# Patient Record
Sex: FEMALE | Race: BLACK OR AFRICAN AMERICAN | NOT HISPANIC OR LATINO | Employment: FULL TIME | URBAN - METROPOLITAN AREA
[De-identification: names, ages, dates, MRNs, and addresses within clinical notes are randomized per-mention and may not be internally consistent; named-entity substitution may affect disease eponyms.]

---

## 2017-01-05 ENCOUNTER — OFFICE VISIT (OUTPATIENT)
Dept: FAMILY MEDICINE CLINIC | Facility: CLINIC | Age: 37
End: 2017-01-05

## 2017-01-05 VITALS
TEMPERATURE: 98.5 F | SYSTOLIC BLOOD PRESSURE: 110 MMHG | RESPIRATION RATE: 20 BRPM | WEIGHT: 197 LBS | DIASTOLIC BLOOD PRESSURE: 78 MMHG | BODY MASS INDEX: 31.8 KG/M2 | HEART RATE: 64 BPM

## 2017-01-05 DIAGNOSIS — F41.9 ANXIETY: ICD-10-CM

## 2017-01-05 DIAGNOSIS — M16.12 OSTEOARTHRITIS OF LEFT HIP, UNSPECIFIED OSTEOARTHRITIS TYPE: Primary | ICD-10-CM

## 2017-01-05 DIAGNOSIS — E55.9 VITAMIN D DEFICIENCY: ICD-10-CM

## 2017-01-05 PROCEDURE — 99213 OFFICE O/P EST LOW 20 MIN: CPT | Performed by: NURSE PRACTITIONER

## 2017-01-05 RX ORDER — ERGOCALCIFEROL 1.25 MG/1
50000 CAPSULE ORAL
Qty: 4 CAPSULE | Refills: 5 | Status: SHIPPED | OUTPATIENT
Start: 2017-01-05 | End: 2017-05-30 | Stop reason: SDUPTHER

## 2017-01-05 RX ORDER — DULOXETIN HYDROCHLORIDE 60 MG/1
60 CAPSULE, DELAYED RELEASE ORAL DAILY
Qty: 30 CAPSULE | Refills: 1 | Status: SHIPPED | OUTPATIENT
Start: 2017-01-05 | End: 2017-03-01 | Stop reason: SDUPTHER

## 2017-01-05 NOTE — MR AVS SNAPSHOT
Bladimir Bah   1/5/2017 10:00 AM   Office Visit    Dept Phone:  [phone]   Encounter #:  [id number]    Provider:  TEJA Dias   Department:  [facility] FAMILY MEDICINE                Your Full Care Plan              Today's Medication Changes          These changes are accurate as of: 1/5/17 10:32 AM.  If you have any questions, ask your nurse or doctor.               New Medication(s)Ordered:     DULoxetine 60 MG capsule   Commonly known as:  CYMBALTA   Take 1 capsule by mouth Daily.   Started by:  TEJA Dias         Medication(s)that have changed:     vitamin D 91110 UNITS capsule capsule   Commonly known as:  ERGOCALCIFEROL   Take 1 capsule by mouth Every 7 (Seven) Days.   What changed:  See the new instructions.   Changed by:  TEJA Dias            Where to Get Your Medications      These medications were sent to [facility]/pharmacy #[id number] - [address] AT [address] [phone] [facility] [phone] [place] [address]    Hours:  24-hours Phone:  [phone]     DULoxetine 60 MG capsule    vitamin D 10046 UNITS capsule capsule                  Your Updated Medication List          This list is accurate as of: 1/5/17 10:32 AM.  Always use your most recent med list.                Cetirizine HCl 10 MG capsule   Commonly known as:  ZYRTEC ALLERGY   Take 10 mg by mouth daily.       dicyclomine 20 MG tablet   Commonly known as:  BENTYL   Take 1 tablet by mouth 4 (four) times a day.       DULoxetine 60 MG capsule   Commonly known as:  CYMBALTA   Take 1 capsule by mouth Daily.       fluticasone 50 MCG/ACT nasal spray   Commonly known as:  FLONASE   2 sprays into each nostril daily.       hydrochlorothiazide 25 MG tablet   Commonly known as:  HYDRODIURIL   TAKE 1 TABLET EVERY DAY       hydrOXYzine 50 MG tablet   Commonly known as:  ATARAX   Take 1 tablet by mouth 3 (three) times a day.       levocetirizine 5 MG tablet   Commonly known as:  XYZAL   Take 1 tablet by mouth daily.       omeprazole 40 MG capsule   Commonly known as:  priLOSEC   Take 1 capsule by mouth daily.       ondansetron ODT 8 MG disintegrating tablet   Commonly known as:  ZOFRAN-ODT       oxyCODONE 10 MG tablet   Commonly known as:  ROXICODONE       raNITIdine 150 MG tablet   Commonly known as:  ZANTAC   Take 1 tablet by mouth every night.       * sertraline 50 MG tablet   Commonly known as:  ZOLOFT   Take 1 tablet by mouth daily.       * sertraline 50 MG tablet   Commonly known as:  ZOLOFT   TAKE 1/2 TABLET DAILY FOR 7 DAYS. THEN INCREASE TO ONE TABLET DAILY.       VITAMIN D (CHOLECALCIFEROL) PO       vitamin D 42157 UNITS capsule capsule   Commonly known as:  ERGOCALCIFEROL   Take 1 capsule by mouth Every 7 (Seven) Days.       * Notice:  This list has 2 medication(s) that are the same as other medications prescribed for you. Read the directions carefully, and ask your doctor or other care provider to review them with you.            We Performed the Following     Ambulatory Referral to Orthopedic Surgery       You Were Diagnosed With        Codes Comments    Osteoarthritis of left hip, unspecified osteoarthritis type    -  Primary ICD-10-CM: M16.12  ICD-9-CM: 715.95     Vitamin D deficiency     ICD-10-CM: E55.9  ICD-9-CM: 268.9     Anxiety     ICD-10-CM: F41.9  ICD-9-CM: 300.00       Instructions     None    Patient Instructions History      Upcoming Appointments     Visit Type Date Time Department    OFFICE VISIT 1/5/2017 10:00 AM [facility]      [employer] Signup     [facility] [employer] allows you to send messages to your doctor, view your test results, renew your prescriptions, schedule appointments, and more. To sign up, go to [employer] and click on the Sign Up Now link in the New User? box. Enter your [employer] Activation Code exactly as it appears below along with the last four digits of your Social  Security Number and your Date of Birth () to complete the sign-up process. If you do not sign up before the expiration date, you must request a new code.    [employer] Activation Code: [email or web address]  Expires: 2017 10:32 AM    If you have questions, you can email [email or web address] or call [phone] to talk to our [employer] staff. Remember, [employer] is NOT to be used for urgent needs. For medical emergencies, dial 911.               Other Info from Your Visit           Allergies     No Known Drug Allergy        Reason for Visit     Anxiety Severe panic attacks. She went to [facility] ER x2-3 days ago. The past month she has been really sick and finally starting to feel better. States it is a continuous cycle where she is always sick    Leg Pain VERONICA. She has a total of 10 hip surgeries that includes 2 hip replacements. The L side is worse. She is wondering if she needs another replacement. However, her previous specialist no longer takes her ins      Vital Signs     Blood Pressure Pulse Temperature Respirations Weight Body Mass Index    110/78 64 98.5 °F (36.9 °C) 20 197 lb (89.4 kg) 31.8 kg/m2    Smoking Status                   Current Every Day Smoker           Problems and Diagnoses Noted     Anxiety problem    Vitamin D deficiency    Osteoarthritis of left hip, unspecified osteoarthritis type    -  Primary

## 2017-01-05 NOTE — PROGRESS NOTES
Subjective   Bladimir Bah is a 36 y.o. female.     History of Present Illness   Hip problems in the past; hx of left hip replacement 2008, thinks she needs to see specialist again, now having a lot of pain, hurts with standing, sitting and laying  Was seeing Dr Farley in the past but no longer takes insurance  Limping gait.     Anxiety  Zoloft is not helping  Went to ER with chest pain/tightness 1/3/17 had labs and CT of chest, EKG, all was negative  Feeling tired all the time, seems to be getting one illness after another  Would like to have different medication  Was supposed to bring lab and test results with her to her visit today but she forgot them    The following portions of the patient's history were reviewed and updated as appropriate: allergies, current medications, past family history, past medical history, past social history, past surgical history and problem list.    Review of Systems   Constitutional: Positive for fatigue. Negative for unexpected weight change.   HENT: Negative.    Eyes: Negative.    Respiratory: Positive for chest tightness. Negative for cough, shortness of breath and wheezing.    Cardiovascular: Negative.  Negative for chest pain, palpitations and leg swelling.   Gastrointestinal: Negative.    Genitourinary: Negative.    Musculoskeletal: Positive for arthralgias (left hip pain), gait problem and myalgias.   Skin: Negative.    Allergic/Immunologic: Negative.    Hematological: Negative.    Psychiatric/Behavioral: The patient is nervous/anxious.        Objective   Physical Exam   Constitutional: She is oriented to person, place, and time. She appears well-developed and well-nourished.   HENT:   Head: Normocephalic.   Right Ear: External ear normal.   Left Ear: External ear normal.   Nose: Nose normal.   Eyes: Pupils are equal, round, and reactive to light.   Neck: Normal range of motion. Neck supple. No JVD present. No thyromegaly present.   Cardiovascular: Normal rate, regular  rhythm, normal heart sounds and intact distal pulses.    No murmur heard.  Pulmonary/Chest: Effort normal and breath sounds normal.   Abdominal: Soft. Bowel sounds are normal.   Musculoskeletal: Normal range of motion.   Lymphadenopathy:     She has no cervical adenopathy.   Neurological: She is alert and oriented to person, place, and time.   Limping gait   Skin: Skin is warm and dry.   Psychiatric: Her speech is normal and behavior is normal. Thought content normal. Her mood appears not anxious. Cognition and memory are normal. She exhibits a depressed mood.   Nursing note and vitals reviewed.      Assessment/Plan   Bladimir was seen today for anxiety and leg pain.    Diagnoses and all orders for this visit:    Osteoarthritis of left hip, unspecified osteoarthritis type  -     Ambulatory Referral to Orthopedic Surgery    Vitamin D deficiency  -     vitamin D (ERGOCALCIFEROL) 12585 UNITS capsule capsule; Take 1 capsule by mouth Every 7 (Seven) Days.    Anxiety  -     DULoxetine (CYMBALTA) 60 MG capsule; Take 1 capsule by mouth Daily.      Stop Zoloft and start Duloxetine for anxiety/depression/chronic pain  Will refill Vit D  Will refer pt to Orthopedics  Pt would like to stay in [place] if possible will see if Dr Solares will take her insurance  I am not sure why she feels so poorly and keeps getting sick, it may be some depression that she is dealing with. F/U if not improving.  I reviewed CT and ER notes from 1/3/17 [facility] but will have pt bring labs that she forgot by when the weather has cleared up. I have cautioned her due to her hip issues to ambulate carefully to avoid falling. She agrees to wait.

## 2017-01-18 ENCOUNTER — OFFICE VISIT (OUTPATIENT)
Dept: FAMILY MEDICINE CLINIC | Facility: CLINIC | Age: 37
End: 2017-01-18

## 2017-01-18 ENCOUNTER — TELEPHONE (OUTPATIENT)
Dept: FAMILY MEDICINE CLINIC | Facility: CLINIC | Age: 37
End: 2017-01-18

## 2017-01-18 VITALS
DIASTOLIC BLOOD PRESSURE: 80 MMHG | RESPIRATION RATE: 16 BRPM | TEMPERATURE: 98.1 F | HEIGHT: 66 IN | SYSTOLIC BLOOD PRESSURE: 110 MMHG | BODY MASS INDEX: 31.37 KG/M2 | HEART RATE: 68 BPM | WEIGHT: 195.2 LBS

## 2017-01-18 DIAGNOSIS — R21 SKIN RASH: ICD-10-CM

## 2017-01-18 DIAGNOSIS — H92.02 OTALGIA, LEFT: ICD-10-CM

## 2017-01-18 DIAGNOSIS — H60.392 OTHER INFECTIVE ACUTE OTITIS EXTERNA OF LEFT EAR: Primary | ICD-10-CM

## 2017-01-18 PROCEDURE — 99213 OFFICE O/P EST LOW 20 MIN: CPT | Performed by: NURSE PRACTITIONER

## 2017-01-18 RX ORDER — OFLOXACIN 3 MG/ML
5 SOLUTION AURICULAR (OTIC) 2 TIMES DAILY
Qty: 10 ML | Refills: 0 | Status: SHIPPED | OUTPATIENT
Start: 2017-01-18 | End: 2017-03-21

## 2017-01-18 RX ORDER — TRIAMCINOLONE ACETONIDE 5 MG/G
OINTMENT TOPICAL 2 TIMES DAILY
Qty: 15 TUBE | Refills: 0 | Status: SHIPPED | OUTPATIENT
Start: 2017-01-18 | End: 2017-01-18 | Stop reason: CLARIF

## 2017-01-18 NOTE — PROGRESS NOTES
Subjective   Bladimir Bah is a 36 y.o. female.     History of Present Illness   Rash on right arm small place on left wrist and bend of right elbow, very itchy  + hx of environmental allergies and sensitive skin  No OTC creams used    Feeling frustrated that she keeps getting ear infections  She has chronic otitis externa  She denies sticking anything in her ear causing trauma, no water exposure in ears  Very painful to touch ear or to lay on left side  No drainage or discharge or fever or swelling to outside of ear, no change in hearing    The following portions of the patient's history were reviewed and updated as appropriate: allergies, current medications, past family history, past medical history, past social history, past surgical history and problem list.    Review of Systems   Constitutional: Negative.    HENT: Positive for ear pain. Negative for ear discharge.    Eyes: Negative.    Respiratory: Negative.    Cardiovascular: Negative.    Gastrointestinal: Negative.    Endocrine: Negative.    Genitourinary: Negative.    Musculoskeletal: Negative.    Skin: Positive for rash.   Allergic/Immunologic: Negative.    Neurological: Negative.    Hematological: Negative.    Psychiatric/Behavioral: Negative.        Objective   Physical Exam   Constitutional: She is oriented to person, place, and time. She appears well-developed and well-nourished.   HENT:   Head: Normocephalic.   Right Ear: External ear normal.   Left Ear: External ear normal.   Nose: Nose normal.   Mouth/Throat: Oropharynx is clear and moist.   Eyes: Conjunctivae are normal. Pupils are equal, round, and reactive to light.   Neck: Normal range of motion.   Cardiovascular: Normal rate, regular rhythm and normal heart sounds.    Pulmonary/Chest: Effort normal and breath sounds normal.   Neurological: She is alert and oriented to person, place, and time.   Skin: Skin is warm and dry. Rash noted.   Psychiatric: Her speech is normal and behavior is normal.  Her affect is blunt (frustrated that she keeps getting sick). Cognition and memory are normal.   Nursing note and vitals reviewed.      Assessment/Plan   Bladimir was seen today for l ear pain on & off for the past week and rash on r arm for the past month.    Diagnoses and all orders for this visit:    Other infective acute otitis externa of left ear    Otalgia, left    Skin rash    Other orders  -     triamcinolone (KENALOG) 0.5 % ointment; Apply  topically 2 (Two) Times a Day.  -     ofloxacin (FLOXIN) 0.3 % otic solution; Administer 5 drops into the left ear 2 (Two) Times a Day.    Use warm compresses to ear and use drops twice day for 14 days to left ear  Use Kenalog cream to rash BID but avoid using on face as this is a high potency steroid cream  Pt agrees  F/u if needed.

## 2017-01-18 NOTE — MR AVS SNAPSHOT
Bladimir Bah   1/18/2017 3:30 PM   Office Visit    Dept Phone:  [phone]   Encounter #:  [id number]    Provider:  TEJA Dias   Department:  [facility] FAMILY MEDICINE                Your Full Care Plan              Today's Medication Changes          These changes are accurate as of: 1/18/17  3:59 PM.  If you have any questions, ask your nurse or doctor.               Medication(s)that have changed:     sertraline 50 MG tablet   Commonly known as:  ZOLOFT   Take 1 tablet by mouth daily.   What changed:  Another medication with the same name was removed. Continue taking this medication, and follow the directions you see here.   Changed by:  CONY Sanders         Stop taking medication(s)listed here:     VITAMIN D (CHOLECALCIFEROL) PO   Stopped by:  TEJA Dias                      Your Updated Medication List          This list is accurate as of: 1/18/17  3:59 PM.  Always use your most recent med list.                Cetirizine HCl 10 MG capsule   Commonly known as:  ZYRTEC ALLERGY   Take 10 mg by mouth daily.       dicyclomine 20 MG tablet   Commonly known as:  BENTYL   Take 1 tablet by mouth 4 (four) times a day.       DULoxetine 60 MG capsule   Commonly known as:  CYMBALTA   Take 1 capsule by mouth Daily.       fluticasone 50 MCG/ACT nasal spray   Commonly known as:  FLONASE   2 sprays into each nostril daily.       hydrochlorothiazide 25 MG tablet   Commonly known as:  HYDRODIURIL   TAKE 1 TABLET EVERY DAY       hydrOXYzine 50 MG tablet   Commonly known as:  ATARAX   Take 1 tablet by mouth 3 (three) times a day.       levocetirizine 5 MG tablet   Commonly known as:  XYZAL   Take 1 tablet by mouth daily.       omeprazole 40 MG capsule   Commonly known as:  priLOSEC   Take 1 capsule by mouth daily.       ondansetron ODT 8 MG disintegrating tablet   Commonly known as:  ZOFRAN-ODT       oxyCODONE 10 MG tablet   Commonly known as:  ROXICODONE       "raNITIdine 150 MG tablet   Commonly known as:  ZANTAC   Take 1 tablet by mouth every night.       sertraline 50 MG tablet   Commonly known as:  ZOLOFT   Take 1 tablet by mouth daily.       vitamin D 34390 UNITS capsule capsule   Commonly known as:  ERGOCALCIFEROL   Take 1 capsule by mouth Every 7 (Seven) Days.               You Were Diagnosed With        Codes Comments    Other infective acute otitis externa of left ear    -  Primary ICD-10-CM: H60.392       Instructions     None    Patient Instructions History      Upcoming Appointments     Visit Type Date Time Department    OFFICE VISIT 2017  3:30 PM [facility]      [employer] Signup     [facility] [employer] allows you to send messages to your doctor, view your test results, renew your prescriptions, schedule appointments, and more. To sign up, go to [employer] and click on the Sign Up Now link in the New User? box. Enter your [employer] Activation Code exactly as it appears below along with the last four digits of your Social Security Number and your Date of Birth () to complete the sign-up process. If you do not sign up before the expiration date, you must request a new code.    [employer] Activation Code: [email or web address]  Expires: 2017 10:32 AM    If you have questions, you can email [email or web address] or call [phone] to talk to our [employer] staff. Remember, [employer] is NOT to be used for urgent needs. For medical emergencies, dial 911.               Other Info from Your Visit           Allergies     No Known Drug Allergy        Reason for Visit     L ear pain on & off for the past week     Rash on R arm for the past month           Vital Signs     Blood Pressure Pulse Temperature Respirations Height Weight    110/80 68 98.1 °F (36.7 °C) 16 66\" (167.6 cm) 195 lb 3.2 oz (88.5 kg)    Body Mass Index Smoking Status                31.51 kg/m2 Current Every Day Smoker          Problems and Diagnoses Noted     Other " infective acute otitis externa of left ear    -  Primary

## 2017-03-01 DIAGNOSIS — F41.9 ANXIETY: ICD-10-CM

## 2017-03-02 RX ORDER — DULOXETIN HYDROCHLORIDE 60 MG/1
CAPSULE, DELAYED RELEASE ORAL
Qty: 30 CAPSULE | Refills: 5 | Status: SHIPPED | OUTPATIENT
Start: 2017-03-02 | End: 2017-05-30 | Stop reason: SDUPTHER

## 2017-03-09 ENCOUNTER — OFFICE VISIT (OUTPATIENT)
Dept: FAMILY MEDICINE CLINIC | Facility: CLINIC | Age: 37
End: 2017-03-09

## 2017-03-09 VITALS
BODY MASS INDEX: 31.76 KG/M2 | SYSTOLIC BLOOD PRESSURE: 120 MMHG | RESPIRATION RATE: 16 BRPM | HEIGHT: 66 IN | TEMPERATURE: 97.5 F | DIASTOLIC BLOOD PRESSURE: 80 MMHG | WEIGHT: 197.6 LBS | HEART RATE: 68 BPM

## 2017-03-09 DIAGNOSIS — L02.12 BOIL OF NECK: Primary | ICD-10-CM

## 2017-03-09 PROCEDURE — 99213 OFFICE O/P EST LOW 20 MIN: CPT | Performed by: NURSE PRACTITIONER

## 2017-03-09 RX ORDER — OXYCODONE AND ACETAMINOPHEN 10; 325 MG/1; MG/1
TABLET ORAL
Refills: 0 | COMMUNITY
Start: 2017-03-06 | End: 2019-08-19

## 2017-03-09 RX ORDER — SULFAMETHOXAZOLE AND TRIMETHOPRIM 800; 160 MG/1; MG/1
1 TABLET ORAL 2 TIMES DAILY
Qty: 20 TABLET | Refills: 0 | Status: SHIPPED | OUTPATIENT
Start: 2017-03-09 | End: 2017-03-21

## 2017-03-09 NOTE — PROGRESS NOTES
"Subjective   Bladimir Bah is a 37 y.o. female.     History of Present Illness   Raised bump on chin that came up yesterday  She has been using heating pad to try to make it better but not helping  + hx of MRSA infection  Some tenderness, feeling bigger as the day has gone on   No meds to try to feel better    The following portions of the patient's history were reviewed and updated as appropriate: allergies, current medications, past family history, past medical history, past social history, past surgical history and problem list.    Review of Systems   Constitutional: Negative for chills and fever.   HENT: Negative.    Eyes: Negative.    Respiratory: Negative.    Cardiovascular: Negative.    Gastrointestinal: Negative.    Endocrine: Negative.  Negative for polyuria.   Genitourinary: Negative.    Musculoskeletal: Negative.    Skin: Positive for wound (boil under chin; + hx of MRSA infection).   Allergic/Immunologic: Negative.    Neurological: Negative.    Hematological: Negative.    Psychiatric/Behavioral: Negative.        Objective   Physical Exam   Constitutional: She is oriented to person, place, and time. She appears well-developed and well-nourished.   HENT:   Head: Normocephalic.   Right Ear: External ear normal.   Left Ear: External ear normal.   Nose: Nose normal.   Neck: Normal range of motion. Neck supple.   Cardiovascular: Normal rate, regular rhythm and normal heart sounds.    Pulmonary/Chest: Effort normal and breath sounds normal.   Lymphadenopathy:     She has no cervical adenopathy.   Neurological: She is alert and oriented to person, place, and time.   Skin: Skin is warm and dry. There is erythema.   Raised warm, tender boil underside of chin size of quarter, no discharge or drainage   Nursing note and vitals reviewed.      Assessment/Plan   Bladimir was seen today for painful \"bump\" under chin.    Diagnoses and all orders for this visit:    Boil of neck    Other orders  -     " sulfamethoxazole-trimethoprim (BACTRIM DS,SEPTRA DS) 800-160 MG per tablet; Take 1 tablet by mouth 2 (Two) Times a Day.      Will treat with BactrimDS twice day for 10 days, avoid heating pad but okay to use warm moist compresses. Take Ibuprofen for pain  Avoid squeezing and do not puncture to try to drain this but if it does not improve will need I&D next week. Pt agrees

## 2017-03-09 NOTE — PATIENT INSTRUCTIONS
Abscess  An abscess (boil or furuncle) is an infected area on or under the skin. This area is filled with yellowish-white fluid (pus) and other material (debris).  HOME CARE   · Only take medicines as told by your doctor.  · If you were given antibiotic medicine, take it as directed. Finish the medicine even if you start to feel better.  · If gauze is used, follow your doctor's directions for changing the gauze.  · To avoid spreading the infection:    Keep your abscess covered with a bandage.    Wash your hands well.    Do not share personal care items, towels, or whirlpools with others.    Avoid skin contact with others.  · Keep your skin and clothes clean around the abscess.  · Keep all doctor visits as told.  GET HELP RIGHT AWAY IF:   · You have more pain, puffiness (swelling), or redness in the wound site.  · You have more fluid or blood coming from the wound site.  · You have muscle aches, chills, or you feel sick.  · You have a fever.  MAKE SURE YOU:   · Understand these instructions.  · Will watch your condition.  · Will get help right away if you are not doing well or get worse.     This information is not intended to replace advice given to you by your health care provider. Make sure you discuss any questions you have with your health care provider.     Document Released: 06/05/2009 Document Revised: 06/18/2013 Document Reviewed: 10/26/2016  [employer] Interactive Patient Education ©2016 [employer]

## 2017-03-21 ENCOUNTER — OFFICE VISIT (OUTPATIENT)
Dept: FAMILY MEDICINE CLINIC | Facility: CLINIC | Age: 37
End: 2017-03-21

## 2017-03-21 VITALS
TEMPERATURE: 98.2 F | DIASTOLIC BLOOD PRESSURE: 80 MMHG | SYSTOLIC BLOOD PRESSURE: 120 MMHG | BODY MASS INDEX: 31.85 KG/M2 | WEIGHT: 198.2 LBS | HEIGHT: 66 IN | RESPIRATION RATE: 16 BRPM | HEART RATE: 72 BPM

## 2017-03-21 DIAGNOSIS — H92.02 LEFT EAR PAIN: Primary | ICD-10-CM

## 2017-03-21 DIAGNOSIS — L02.12 BOIL OF NECK: ICD-10-CM

## 2017-03-21 DIAGNOSIS — F41.9 ANXIETY: ICD-10-CM

## 2017-03-21 PROBLEM — J30.2 SEASONAL ALLERGIES: Status: ACTIVE | Noted: 2017-03-21

## 2017-03-21 PROBLEM — I95.9 HYPOTENSION: Status: ACTIVE | Noted: 2017-03-21

## 2017-03-21 PROBLEM — M25.50 ARTHRALGIA: Status: ACTIVE | Noted: 2017-03-21

## 2017-03-21 PROCEDURE — 99213 OFFICE O/P EST LOW 20 MIN: CPT | Performed by: NURSE PRACTITIONER

## 2017-03-21 NOTE — PROGRESS NOTES
Subjective   Bladimir Bah is a 37 y.o. female.     History of Present Illness   Left ear pain  Still having pain and itching in left ear  No discharge or drainage,no change in hearing, no hx of excessive wax build up  She does clinch her jaw and grind her teeth at night   No fever or chills  No ice or heat or NSAIDs, she has pain meds  She has seen ENT in the past and would like referral again    Place on her neck is still inflamed and slightly tender, she took all the oral abx     Cymbalta is helping her anxiety a lot, she is having a lot of trouble with not being able to sleep on the med. She takes it at night    The following portions of the patient's history were reviewed and updated as appropriate: allergies, current medications, past family history, past medical history, past social history, past surgical history and problem list.    Review of Systems   Constitutional: Positive for fatigue.   HENT: Positive for ear pain.    Eyes: Negative.    Respiratory: Negative.    Cardiovascular: Negative.    Gastrointestinal: Negative.    Endocrine: Negative.    Genitourinary: Negative.    Musculoskeletal: Negative.    Skin: Positive for color change.   Allergic/Immunologic: Negative.    Neurological: Negative.    Hematological: Negative.    Psychiatric/Behavioral: Positive for dysphoric mood and sleep disturbance. The patient is nervous/anxious.        Objective   Physical Exam   Constitutional: She is oriented to person, place, and time. Vital signs are normal. She appears well-developed and well-nourished. No distress.   HENT:   Head: Normocephalic.   Right Ear: External ear normal. A foreign body (cerumen impaction in right ear canal) is present.   Left Ear: Tympanic membrane, external ear and ear canal normal. There is tenderness. Left ear foreign body: cerumen noted at opening of left ear canal, redness, discharge noted.   Nose: Nose normal. No mucosal edema or rhinorrhea. Right sinus exhibits no maxillary sinus  "tenderness and no frontal sinus tenderness. Left sinus exhibits no maxillary sinus tenderness and no frontal sinus tenderness.   Mouth/Throat: Uvula is midline and mucous membranes are normal.   Eyes: Conjunctivae and lids are normal. Pupils are equal, round, and reactive to light.   Neck: Trachea normal and normal range of motion. Neck supple. No JVD present. Carotid bruit is not present. No thyroid mass and no thyromegaly present.   Cardiovascular: Normal rate, regular rhythm, S1 normal, S2 normal and normal heart sounds.    Pulmonary/Chest: Effort normal and breath sounds normal.   Abdominal: Soft. Normal appearance and bowel sounds are normal. She exhibits no distension. There is no tenderness.   Musculoskeletal: Normal range of motion.   Lymphadenopathy:        Head (right side): No tonsillar, no preauricular, no posterior auricular and no occipital adenopathy present.        Head (left side): No tonsillar, no preauricular, no posterior auricular and no occipital adenopathy present.     She has no cervical adenopathy.   Neurological: She is alert and oriented to person, place, and time. She has normal reflexes.   Skin: Skin is warm, dry and intact. Rash noted. There is erythema. No cyanosis. Nails show no clubbing.   Firm tender nodule under chin the size of a dime, which is smaller than previous. she has other areas that are scars from pimples in this same area.    Psychiatric: She has a normal mood and affect. Her speech is normal and behavior is normal. Judgment and thought content normal. Cognition and memory are normal.   Nursing note and vitals reviewed.      Assessment/Plan   Bladimir was seen today for l ear pain on & off since jan. and fu on neck \"cyst\".    Diagnoses and all orders for this visit:    Left ear pain  -     Ambulatory Referral to ENT (Otolaryngology)    Boil of neck    Other orders  -     mupirocin (BACTROBAN) 2 % ointment; Apply  topically 3 (Three) Times a Day.    I suspect she is having " left ear pain due to grinding teeth and clinching her jaw  She has requested a referral to ENT, attempted to remove the cerumen from her ears but she was unable to tolerate this in the office  Referral placed  Will have pt use Mupirocin ointment and warm compresses to affected skin nodule, she will most likely need an incision and drainage of this, will refer pt to dermatology for this if she does not improve. I wanted to put her on a different oral abx but she declined. Recommend that she start on Probiotics daily.  Continue Duloxetine, recommend that she take it in the morning since it is causing sleep disturbance. Pt agrees.

## 2017-05-30 ENCOUNTER — OFFICE VISIT (OUTPATIENT)
Dept: FAMILY MEDICINE CLINIC | Facility: CLINIC | Age: 37
End: 2017-05-30

## 2017-05-30 VITALS
DIASTOLIC BLOOD PRESSURE: 72 MMHG | RESPIRATION RATE: 16 BRPM | HEART RATE: 78 BPM | TEMPERATURE: 97.6 F | WEIGHT: 195 LBS | BODY MASS INDEX: 31.34 KG/M2 | SYSTOLIC BLOOD PRESSURE: 114 MMHG | HEIGHT: 66 IN

## 2017-05-30 DIAGNOSIS — M79.605 CHRONIC PAIN OF BOTH LOWER EXTREMITIES: ICD-10-CM

## 2017-05-30 DIAGNOSIS — Z72.0 TOBACCO USE: ICD-10-CM

## 2017-05-30 DIAGNOSIS — J30.2 SEASONAL ALLERGIC RHINITIS, UNSPECIFIED ALLERGIC RHINITIS TRIGGER: ICD-10-CM

## 2017-05-30 DIAGNOSIS — M79.604 CHRONIC PAIN OF BOTH LOWER EXTREMITIES: ICD-10-CM

## 2017-05-30 DIAGNOSIS — E55.9 VITAMIN D DEFICIENCY: ICD-10-CM

## 2017-05-30 DIAGNOSIS — K21.9 GASTROESOPHAGEAL REFLUX DISEASE, ESOPHAGITIS PRESENCE NOT SPECIFIED: ICD-10-CM

## 2017-05-30 DIAGNOSIS — G89.29 CHRONIC PAIN OF BOTH LOWER EXTREMITIES: ICD-10-CM

## 2017-05-30 DIAGNOSIS — K58.0 IRRITABLE BOWEL SYNDROME WITH DIARRHEA: ICD-10-CM

## 2017-05-30 DIAGNOSIS — IMO0001 ELEVATED BLOOD PRESSURE: ICD-10-CM

## 2017-05-30 DIAGNOSIS — Z91.09 ENVIRONMENTAL ALLERGIES: Primary | ICD-10-CM

## 2017-05-30 DIAGNOSIS — F41.9 ANXIETY: ICD-10-CM

## 2017-05-30 PROCEDURE — 99214 OFFICE O/P EST MOD 30 MIN: CPT | Performed by: PHYSICIAN ASSISTANT

## 2017-05-30 PROCEDURE — 99406 BEHAV CHNG SMOKING 3-10 MIN: CPT | Performed by: PHYSICIAN ASSISTANT

## 2017-05-30 RX ORDER — FLUTICASONE PROPIONATE 50 MCG
2 SPRAY, SUSPENSION (ML) NASAL DAILY
Qty: 1 EACH | Refills: 5 | Status: SHIPPED | OUTPATIENT
Start: 2017-05-30 | End: 2018-11-29 | Stop reason: SDUPTHER

## 2017-05-30 RX ORDER — DICYCLOMINE HCL 20 MG
20 TABLET ORAL 4 TIMES DAILY
Qty: 120 TABLET | Refills: 5 | Status: SHIPPED | OUTPATIENT
Start: 2017-05-30 | End: 2018-11-29 | Stop reason: SDUPTHER

## 2017-05-30 RX ORDER — DULOXETIN HYDROCHLORIDE 60 MG/1
60 CAPSULE, DELAYED RELEASE ORAL DAILY
Qty: 30 CAPSULE | Refills: 5 | Status: SHIPPED | OUTPATIENT
Start: 2017-05-30 | End: 2018-11-29

## 2017-05-30 RX ORDER — RANITIDINE 150 MG/1
150 TABLET ORAL NIGHTLY
Qty: 30 TABLET | Refills: 5 | Status: SHIPPED | OUTPATIENT
Start: 2017-05-30 | End: 2018-06-19 | Stop reason: SDUPTHER

## 2017-05-30 RX ORDER — METHYLPREDNISOLONE 4 MG/1
TABLET ORAL
Qty: 21 TABLET | Refills: 0 | Status: SHIPPED | OUTPATIENT
Start: 2017-05-30 | End: 2017-08-23

## 2017-05-30 RX ORDER — OMEPRAZOLE 40 MG/1
40 CAPSULE, DELAYED RELEASE ORAL DAILY
Qty: 30 CAPSULE | Refills: 5 | Status: SHIPPED | OUTPATIENT
Start: 2017-05-30 | End: 2018-11-29 | Stop reason: SDUPTHER

## 2017-05-30 RX ORDER — HYDROXYZINE 50 MG/1
50 TABLET, FILM COATED ORAL 3 TIMES DAILY
Qty: 90 TABLET | Refills: 5 | Status: SHIPPED | OUTPATIENT
Start: 2017-05-30 | End: 2018-11-29 | Stop reason: SDUPTHER

## 2017-05-30 RX ORDER — ONDANSETRON 8 MG/1
8 TABLET, ORALLY DISINTEGRATING ORAL EVERY 8 HOURS PRN
Qty: 20 TABLET | Refills: 5 | Status: SHIPPED | OUTPATIENT
Start: 2017-05-30 | End: 2018-11-29 | Stop reason: SDUPTHER

## 2017-05-30 RX ORDER — ERGOCALCIFEROL 1.25 MG/1
50000 CAPSULE ORAL
Qty: 4 CAPSULE | Refills: 5 | Status: SHIPPED | OUTPATIENT
Start: 2017-05-30 | End: 2018-01-12 | Stop reason: SDUPTHER

## 2017-05-30 RX ORDER — HYDROCHLOROTHIAZIDE 25 MG/1
25 TABLET ORAL DAILY
Qty: 30 TABLET | Refills: 5 | Status: SHIPPED | OUTPATIENT
Start: 2017-05-30 | End: 2018-11-29 | Stop reason: SDUPTHER

## 2017-05-30 RX ORDER — LEVOCETIRIZINE DIHYDROCHLORIDE 5 MG/1
5 TABLET, FILM COATED ORAL DAILY
Qty: 30 TABLET | Refills: 5 | Status: SHIPPED | OUTPATIENT
Start: 2017-05-30 | End: 2018-05-28 | Stop reason: SDUPTHER

## 2017-06-02 ENCOUNTER — TELEPHONE (OUTPATIENT)
Dept: FAMILY MEDICINE CLINIC | Facility: CLINIC | Age: 37
End: 2017-06-02

## 2017-06-02 NOTE — TELEPHONE ENCOUNTER
----- Message from Alisha Negron sent at 6/2/2017  4:25 PM EDT -----  Contact: KATE LIMON  PATIENT IS FOLLOWING UP HER PRIOR AUTHORIZATION FOR HER Cetirizine HCl (ZYRTEC ALLERGY) 10 MG  PLEASE CALL

## 2017-06-05 NOTE — TELEPHONE ENCOUNTER
Have not received a PA yet, patient will likely need to choose between Xyzal or Zyrtec as these are very similar medications and she should only be taking either/ or.  Most of the time insurance will only approve what is on formulary. SHERLY

## 2017-06-08 ENCOUNTER — TELEPHONE (OUTPATIENT)
Dept: FAMILY MEDICINE CLINIC | Facility: CLINIC | Age: 37
End: 2017-06-08

## 2017-06-08 NOTE — TELEPHONE ENCOUNTER
Nallely at [facility] said pt just picked up Xyzal on 5-30-17, so she would cancel the request for Zyrtec because insurance will not cover both.

## 2017-06-08 NOTE — TELEPHONE ENCOUNTER
----- Message from Elizabeth Jean-Baptiste sent at 6/8/2017  4:11 PM EDT -----  Contact: [facility] CALLING TO CHECK ON A PA    CALL JONY   [phone]

## 2017-06-08 NOTE — TELEPHONE ENCOUNTER
Spoke w/ Nallely at [facility], I questioned which one pt was wanting prior auth, the Zyrtec of Xyzal? She said, she just picked up the Xyzal on 5-30-17, so they will cancel the Zyrtec request because insurance will not cover both.

## 2017-08-23 ENCOUNTER — OFFICE VISIT (OUTPATIENT)
Dept: FAMILY MEDICINE CLINIC | Facility: CLINIC | Age: 37
End: 2017-08-23

## 2017-08-23 VITALS
HEART RATE: 86 BPM | WEIGHT: 197 LBS | OXYGEN SATURATION: 97 % | BODY MASS INDEX: 31.66 KG/M2 | HEIGHT: 66 IN | SYSTOLIC BLOOD PRESSURE: 94 MMHG | DIASTOLIC BLOOD PRESSURE: 68 MMHG | TEMPERATURE: 96.8 F | RESPIRATION RATE: 18 BRPM

## 2017-08-23 DIAGNOSIS — Z72.0 TOBACCO USE: ICD-10-CM

## 2017-08-23 DIAGNOSIS — J01.00 ACUTE MAXILLARY SINUSITIS, RECURRENCE NOT SPECIFIED: Primary | ICD-10-CM

## 2017-08-23 DIAGNOSIS — R11.0 NAUSEA: ICD-10-CM

## 2017-08-23 DIAGNOSIS — R05.9 COUGH: ICD-10-CM

## 2017-08-23 PROCEDURE — 96372 THER/PROPH/DIAG INJ SC/IM: CPT | Performed by: FAMILY MEDICINE

## 2017-08-23 PROCEDURE — 99213 OFFICE O/P EST LOW 20 MIN: CPT | Performed by: PHYSICIAN ASSISTANT

## 2017-08-23 PROCEDURE — 99406 BEHAV CHNG SMOKING 3-10 MIN: CPT | Performed by: PHYSICIAN ASSISTANT

## 2017-08-23 RX ORDER — TRIAMCINOLONE ACETONIDE 40 MG/ML
40 INJECTION, SUSPENSION INTRA-ARTICULAR; INTRAMUSCULAR ONCE
Status: COMPLETED | OUTPATIENT
Start: 2017-08-23 | End: 2017-08-23

## 2017-08-23 RX ORDER — PROMETHAZINE HYDROCHLORIDE 25 MG/ML
25 INJECTION, SOLUTION INTRAMUSCULAR; INTRAVENOUS ONCE
Status: COMPLETED | OUTPATIENT
Start: 2017-08-23 | End: 2017-08-23

## 2017-08-23 RX ORDER — BROMPHENIRAMINE MALEATE, PSEUDOEPHEDRINE HYDROCHLORIDE, AND DEXTROMETHORPHAN HYDROBROMIDE 2; 30; 10 MG/5ML; MG/5ML; MG/5ML
5 SYRUP ORAL 4 TIMES DAILY PRN
Qty: 118 ML | Refills: 0 | Status: SHIPPED | OUTPATIENT
Start: 2017-08-23 | End: 2018-01-12

## 2017-08-23 RX ORDER — AMOXICILLIN AND CLAVULANATE POTASSIUM 875; 125 MG/1; MG/1
1 TABLET, FILM COATED ORAL 2 TIMES DAILY
Qty: 14 TABLET | Refills: 0 | Status: SHIPPED | OUTPATIENT
Start: 2017-08-23 | End: 2017-11-02

## 2017-08-23 RX ORDER — FLUCONAZOLE 150 MG/1
150 TABLET ORAL ONCE
Qty: 1 TABLET | Refills: 0 | Status: SHIPPED | OUTPATIENT
Start: 2017-08-23 | End: 2017-08-23

## 2017-08-23 RX ADMIN — PROMETHAZINE HYDROCHLORIDE 25 MG: 25 INJECTION, SOLUTION INTRAMUSCULAR; INTRAVENOUS at 10:33

## 2017-08-23 RX ADMIN — TRIAMCINOLONE ACETONIDE 40 MG: 40 INJECTION, SUSPENSION INTRA-ARTICULAR; INTRAMUSCULAR at 10:34

## 2017-08-23 NOTE — PROGRESS NOTES
Subjective   Bladimir Bah is a 37 y.o. female.     Sinusitis   This is a new problem. The current episode started in the past 7 days. The problem has been rapidly worsening since onset. There has been no fever. Her pain is at a severity of 3/10. The pain is mild. Associated symptoms include congestion, coughing, ear pain, headaches, sinus pressure and a sore throat. Pertinent negatives include no chills, diaphoresis, hoarse voice, neck pain, shortness of breath, sneezing or swollen glands. Treatments tried: anithistamine, flonase. The treatment provided no relief.    nausea with vomiting yesterday from drainage.   Blowing brown thick drainage from nose   Dizziness     The following portions of the patient's history were reviewed and updated as appropriate: allergies, current medications, past family history, past medical history, past social history, past surgical history and problem list.    Review of Systems   Constitutional: Positive for appetite change and fatigue. Negative for chills, diaphoresis and fever.   HENT: Positive for congestion, ear pain, postnasal drip, rhinorrhea, sinus pressure and sore throat. Negative for ear discharge, hearing loss, hoarse voice, nosebleeds and sneezing.    Eyes: Negative.    Respiratory: Positive for cough. Negative for chest tightness, shortness of breath and wheezing.    Cardiovascular: Negative.  Negative for chest pain, palpitations and leg swelling.   Gastrointestinal: Positive for nausea and vomiting. Negative for abdominal distention, abdominal pain, anal bleeding, blood in stool, constipation, diarrhea and rectal pain.   Endocrine: Negative.  Negative for cold intolerance, heat intolerance, polydipsia, polyphagia and polyuria.   Genitourinary: Negative.  Negative for difficulty urinating, dysuria, flank pain, frequency, hematuria and urgency.   Musculoskeletal: Negative.  Negative for arthralgias, back pain, gait problem, joint swelling, myalgias, neck pain and neck  "stiffness.   Skin: Negative.  Negative for color change, pallor, rash and wound.   Allergic/Immunologic: Positive for environmental allergies. Negative for immunocompromised state.   Neurological: Positive for dizziness and headaches. Negative for syncope, weakness, light-headedness and numbness.   Hematological: Negative.  Negative for adenopathy. Does not bruise/bleed easily.   Psychiatric/Behavioral: Negative.  Negative for behavioral problems, confusion, self-injury, sleep disturbance and suicidal ideas. The patient is not nervous/anxious.        Objective    Blood pressure 94/68, pulse 86, temperature 96.8 °F (36 °C), temperature source Temporal Artery , resp. rate 18, height 66\" (167.6 cm), weight 197 lb (89.4 kg), SpO2 97 %.     Physical Exam   Constitutional: She is oriented to person, place, and time. She appears well-developed and well-nourished. She appears ill.   HENT:   Head: Normocephalic and atraumatic.   Right Ear: External ear and ear canal normal. Tympanic membrane is not erythematous, not retracted and not bulging. A middle ear effusion is present.   Left Ear: External ear and ear canal normal. Tympanic membrane is erythematous. Tympanic membrane is not retracted and not bulging. A middle ear effusion is present.   Nose: Mucosal edema present. No rhinorrhea. Right sinus exhibits maxillary sinus tenderness. Right sinus exhibits no frontal sinus tenderness. Left sinus exhibits maxillary sinus tenderness. Left sinus exhibits no frontal sinus tenderness.   Mouth/Throat: Posterior oropharyngeal erythema present. No oropharyngeal exudate or posterior oropharyngeal edema.   +PND    Eyes: Conjunctivae and EOM are normal. Pupils are equal, round, and reactive to light.   Neck: Normal range of motion. Neck supple. No tracheal deviation present. No thyromegaly present.   Cardiovascular: Normal rate, regular rhythm, normal heart sounds and intact distal pulses.  Exam reveals no gallop and no friction rub.  "   No murmur heard.  Pulmonary/Chest: Effort normal and breath sounds normal. No respiratory distress. She has no wheezes. She has no rales. She exhibits no tenderness.   Frequent hacking cough    Abdominal: Soft. Bowel sounds are normal. She exhibits no distension and no mass. There is no tenderness. There is no rebound and no guarding. No hernia.   Lymphadenopathy:     She has no cervical adenopathy.   Neurological: She is alert and oriented to person, place, and time.   Skin: Skin is warm and dry.   Psychiatric: She has a normal mood and affect. Her behavior is normal. Judgment and thought content normal.   Nursing note and vitals reviewed.      Assessment/Plan   Bladimir was seen today for nasal congestion and left ear pain.    Diagnoses and all orders for this visit:    Acute maxillary sinusitis, recurrence not specified  -     amoxicillin-clavulanate (AUGMENTIN) 875-125 MG per tablet; Take 1 tablet by mouth 2 (Two) Times a Day.  -     fluconazole (DIFLUCAN) 150 MG tablet; Take 1 tablet by mouth 1 (One) Time for 1 dose.  -     triamcinolone acetonide (KENALOG-40) injection 40 mg; Inject 1 mL into the shoulder, thigh, or buttocks 1 (One) Time.    Nausea  -     promethazine (PHENERGAN) injection 25 mg; Inject 1 mL into the shoulder, thigh, or buttocks 1 (One) Time.    Cough  -     brompheniramine-pseudoephedrine-DM 30-2-10 MG/5ML syrup; Take 5 mL by mouth 4 (Four) Times a Day As Needed for Allergies.      Treatment as outlined in plan. Pt has ride today, kenalog and phenergan administered in office. F/U or report to ER if new or worsening symptoms develop. Pt agrees.   I advised the patient of the risks in continuing to use tobacco, and I provided this patient with smoking cessation educational materials.  I also discussed how to quit smoking   During this visit, I spent 3-10 mintues counseling the patient regarding smoking cessation.

## 2017-08-25 ENCOUNTER — TELEPHONE (OUTPATIENT)
Dept: FAMILY MEDICINE CLINIC | Facility: CLINIC | Age: 37
End: 2017-08-25

## 2017-08-25 RX ORDER — FLUCONAZOLE 150 MG/1
150 TABLET ORAL ONCE
Qty: 1 TABLET | Refills: 0 | Status: SHIPPED | OUTPATIENT
Start: 2017-08-25 | End: 2017-08-25

## 2017-08-25 NOTE — TELEPHONE ENCOUNTER
----- Message from Betty Ruano sent at 8/25/2017  4:20 PM EDT -----  Contact: DR. CLINE ;[facility]; [facility]  PT WAS SEEN IN THE OFFICE THE OTHER DAY AND SHE WAS GIVEN Dfluconazole (DIFLUCAN) 150 MG tablet AND SHE IS NEEDING A REFILL ON IT. SHE IS STILL NOT FEELING BETTER OR SOUNDING BETTER.     CALL BACK   [phone]

## 2017-08-25 NOTE — TELEPHONE ENCOUNTER
SPOKE WITH PT AND SHE IS STILL HAVING SX OF YEAST INFECTION AND SENT IN DIFLUCAN AND SHE STATES THAT ITS BREAKING UP COUGH WISE AND WAS INFORMED IF NOT BETTER Monday GIVE US A CALL.

## 2017-08-25 NOTE — TELEPHONE ENCOUNTER
She is still having chest congestion & cough, she will continue the antibiotic but definetely having vaginal itching and burning and wants a refill on the diflucan sent to the pharm.

## 2017-08-25 NOTE — TELEPHONE ENCOUNTER
If she having symptoms of a yeast infection?  The Diflucan is for that.  Okay to send in the Diflucan times one if she is still having symptoms of yeast infection.    For the symptoms of the sinus infection/cough improving at all?  She just started this a couple days ago so may still need to take some time.  If not worsening, then give over the weekend and call on Monday if not getting better.

## 2017-09-18 DIAGNOSIS — E55.9 VITAMIN D DEFICIENCY: ICD-10-CM

## 2017-09-19 RX ORDER — ERGOCALCIFEROL 1.25 MG/1
CAPSULE ORAL
Qty: 4 CAPSULE | Refills: 5 | Status: SHIPPED | OUTPATIENT
Start: 2017-09-19 | End: 2018-11-29 | Stop reason: SDUPTHER

## 2017-11-02 ENCOUNTER — OFFICE VISIT (OUTPATIENT)
Dept: FAMILY MEDICINE CLINIC | Facility: CLINIC | Age: 37
End: 2017-11-02

## 2017-11-02 VITALS
BODY MASS INDEX: 31.02 KG/M2 | WEIGHT: 193 LBS | RESPIRATION RATE: 18 BRPM | OXYGEN SATURATION: 98 % | DIASTOLIC BLOOD PRESSURE: 74 MMHG | HEIGHT: 66 IN | HEART RATE: 81 BPM | SYSTOLIC BLOOD PRESSURE: 120 MMHG

## 2017-11-02 DIAGNOSIS — R11.0 NAUSEA WITHOUT VOMITING: ICD-10-CM

## 2017-11-02 DIAGNOSIS — K52.9 ACUTE GASTROENTERITIS: Primary | ICD-10-CM

## 2017-11-02 PROCEDURE — 99213 OFFICE O/P EST LOW 20 MIN: CPT | Performed by: NURSE PRACTITIONER

## 2017-11-02 RX ORDER — DIPHENOXYLATE HYDROCHLORIDE AND ATROPINE SULFATE 2.5; .025 MG/1; MG/1
1 TABLET ORAL 4 TIMES DAILY PRN
Qty: 20 TABLET | Refills: 0 | Status: SHIPPED | OUTPATIENT
Start: 2017-11-02 | End: 2018-12-14

## 2017-11-02 NOTE — PROGRESS NOTES
Subjective   Bladimir Bah is a 37 y.o. female.     History of Present Illness   Diarrhea for 3 days  Watery diarrhea, feeling weak and very tired today  Some nausea but not vomiting, no abdominal pain, just cramping with diarrhea  Chills but no fever  Exposed to co worker with same sx  No HA or dizziness or lightheadedness  No OTC meds to try to stop diarrhea, drinking PowerAid and taking Zofran for diarrhea as needed  Needs work excuse for tonight    The following portions of the patient's history were reviewed and updated as appropriate: allergies, current medications, past family history, past medical history, past social history, past surgical history and problem list.    Review of Systems   Constitutional: Positive for activity change, appetite change, chills and fatigue. Negative for fever.   HENT: Negative.    Eyes: Negative.    Respiratory: Negative.    Cardiovascular: Negative.    Gastrointestinal: Positive for diarrhea and nausea. Negative for abdominal pain, blood in stool and vomiting.   Endocrine: Negative.    Genitourinary: Negative.    Musculoskeletal: Negative.    Skin: Negative.    Allergic/Immunologic: Negative.    Neurological: Negative.  Negative for dizziness, light-headedness and headaches.   Hematological: Negative.        Objective   Physical Exam   Constitutional: She is oriented to person, place, and time. She appears well-developed and well-nourished. No distress.   HENT:   Head: Normocephalic.   Cardiovascular: Normal rate, regular rhythm and normal heart sounds.    Pulmonary/Chest: Effort normal and breath sounds normal.   Abdominal: Soft. She exhibits no distension. Bowel sounds are decreased. There is no tenderness. There is no rebound and no guarding.   Neurological: She is alert and oriented to person, place, and time.   Skin: Skin is warm and dry.   Psychiatric: She has a normal mood and affect. Her behavior is normal.   Nursing note and vitals reviewed.      Assessment/Plan    Bladimir was seen today for diarrhea.    Diagnoses and all orders for this visit:    Acute gastroenteritis    Nausea without vomiting    Other orders  -     diphenoxylate-atropine (LOMOTIL) 2.5-0.025 MG per tablet; Take 1 tablet by mouth 4 (Four) Times a Day As Needed for Diarrhea.      Continue to take Zofran as needed for nausea  Push fluids and advance diet as tolerated  Will give a few Lomotil to take for diarrhea  Go to ER if unable to keep fluids down or if abdominal pain or fever or blood in stools. Pt agrees  Work excuse for tonight, return to work [place] night.

## 2017-11-02 NOTE — PATIENT INSTRUCTIONS
Viral Gastroenteritis, Adult  Viral gastroenteritis is also known as the stomach flu. This condition is caused by various viruses. These viruses can be passed from person to person very easily (are very contagious). This condition may affect your stomach, small intestine, and large intestine. It can cause sudden watery diarrhea, fever, and vomiting.  Diarrhea and vomiting can make you feel weak and cause you to become dehydrated. You may not be able to keep fluids down. Dehydration can make you tired and thirsty, cause you to have a dry mouth, and decrease how often you urinate. Older adults and people with other diseases or a weak immune system are at higher risk for dehydration.  It is important to replace the fluids that you lose from diarrhea and vomiting. If you become severely dehydrated, you may need to get fluids through an IV tube.  CAUSES  Gastroenteritis is caused by various viruses, including rotavirus and norovirus. Norovirus is the most common cause in adults.  You can get sick by eating food, drinking water, or touching a surface contaminated with one of these viruses. You can also get sick from sharing utensils or other personal items with an infected person.  RISK FACTORS  This condition is more likely to develop in people:  · Who have a weak defense system (immune system).  · Who live with one or more children who are younger than 2 years old.  · Who live in a nursing home.  · Who go on cruise ships.  SYMPTOMS  Symptoms of this condition start suddenly 1-2 days after exposure to a virus. Symptoms may last a few days or as long as a week. The most common symptoms are watery diarrhea and vomiting. Other symptoms include:  · Fever.  · Headache.  · Fatigue.  · Pain in the abdomen.  · Chills.  · Weakness.  · Nausea.  · Muscle aches.  · Loss of appetite.  DIAGNOSIS  This condition is diagnosed with a medical history and physical exam. You may also have a stool test to check for viruses or other  infections.  TREATMENT  This condition typically goes away on its own. The focus of treatment is to restore lost fluids (rehydration). Your health care provider may recommend that you take an oral rehydration solution (ORS) to replace important salts and minerals (electrolytes) in your body. Severe cases of this condition may require giving fluids through an IV tube.  Treatment may also include medicine to help with your symptoms.  HOME CARE INSTRUCTIONS  Follow instructions from your health care provider about how to care for yourself at home.  Eating and Drinking  Follow these recommendations as told by your health care provider:  · Take an ORS. This is a drink that is sold at pharmacies and retail stores.  · Drink clear fluids in small amounts as you are able. Clear fluids include water, ice chips, diluted fruit juice, and low-calorie sports drinks.  · Eat bland, easy-to-digest foods in small amounts as you are able. These foods include bananas, applesauce, rice, lean meats, toast, and crackers.  · Avoid fluids that contain a lot of sugar or caffeine, such as energy drinks, sports drinks, and soda.  · Avoid alcohol.  · Avoid spicy or fatty foods.  General Instructions  · Drink enough fluid to keep your urine clear or pale yellow.  · Wash your hands often. If soap and water are not available, use hand .  · Make sure that all people in your household wash their hands well and often.  · Take over-the-counter and prescription medicines only as told by your health care provider.  · Rest at home while you recover.  · Watch your condition for any changes.  · Take a warm bath to relieve any burning or pain from frequent diarrhea episodes.  · Keep all follow-up visits as told by your health care provider. This is important.  SEEK MEDICAL CARE IF:  · You cannot keep fluids down.  · Your symptoms get worse.  · You have new symptoms.  · You feel light-headed or dizzy.  · You have muscle cramps.  SEEK IMMEDIATE  MEDICAL CARE IF:  · You have chest pain.  · You feel extremely weak or you faint.  · You see blood in your vomit.  · Your vomit looks like coffee grounds.  · You have bloody or black stools or stools that look like tar.  · You have a severe headache, a stiff neck, or both.  · You have a rash.  · You have severe pain, cramping, or bloating in your abdomen.  · You have trouble breathing or you are breathing very quickly.  · Your heart is beating very quickly.  · Your skin feels cold and clammy.  · You feel confused.  · You have pain when you urinate.  · You have signs of dehydration, such as:    Dark urine, very little urine, or no urine.    Cracked lips.    Dry mouth.    Sunken eyes.    Sleepiness.    Weakness.     This information is not intended to replace advice given to you by your health care provider. Make sure you discuss any questions you have with your health care provider.     Document Released: 12/18/2006 Document Revised: 04/10/2017 Document Reviewed: 08/23/2016  [employer] Patient Education ©2017 [employer]

## 2018-01-12 ENCOUNTER — OFFICE VISIT (OUTPATIENT)
Dept: FAMILY MEDICINE CLINIC | Facility: CLINIC | Age: 38
End: 2018-01-12

## 2018-01-12 VITALS
SYSTOLIC BLOOD PRESSURE: 130 MMHG | HEIGHT: 66 IN | BODY MASS INDEX: 31.5 KG/M2 | WEIGHT: 196 LBS | TEMPERATURE: 97.8 F | HEART RATE: 88 BPM | DIASTOLIC BLOOD PRESSURE: 80 MMHG | RESPIRATION RATE: 16 BRPM

## 2018-01-12 DIAGNOSIS — N89.8 VAGINAL DISCHARGE: ICD-10-CM

## 2018-01-12 DIAGNOSIS — R73.9 HYPERGLYCEMIA: ICD-10-CM

## 2018-01-12 DIAGNOSIS — R35.0 URINARY FREQUENCY: Primary | ICD-10-CM

## 2018-01-12 LAB
ALBUMIN SERPL-MCNC: 4.3 G/DL (ref 3.2–4.8)
ALBUMIN/GLOB SERPL: 1.5 G/DL (ref 1.5–2.5)
ALP SERPL-CCNC: 121 U/L (ref 25–100)
ALT SERPL-CCNC: 11 U/L (ref 7–40)
AST SERPL-CCNC: 13 U/L (ref 0–33)
BILIRUB BLD-MCNC: ABNORMAL MG/DL
BILIRUB SERPL-MCNC: 0.3 MG/DL (ref 0.3–1.2)
BUN SERPL-MCNC: 10 MG/DL (ref 9–23)
BUN/CREAT SERPL: 10 (ref 7–25)
CALCIUM SERPL-MCNC: 9.9 MG/DL (ref 8.7–10.4)
CHLORIDE SERPL-SCNC: 104 MMOL/L (ref 99–109)
CLARITY, POC: ABNORMAL
CO2 SERPL-SCNC: 31 MMOL/L (ref 20–31)
COLOR UR: YELLOW
CREAT SERPL-MCNC: 1 MG/DL (ref 0.6–1.3)
GLOBULIN SER CALC-MCNC: 2.8 GM/DL
GLUCOSE SERPL-MCNC: 91 MG/DL (ref 70–100)
GLUCOSE UR STRIP-MCNC: NEGATIVE MG/DL
HBA1C MFR BLD: 5.7 % (ref 4.8–5.6)
KETONES UR QL: ABNORMAL
LEUKOCYTE EST, POC: ABNORMAL
NITRITE UR-MCNC: NEGATIVE MG/ML
PH UR: 6 [PH] (ref 5–8)
POTASSIUM SERPL-SCNC: 4.1 MMOL/L (ref 3.5–5.5)
PROT SERPL-MCNC: 7.1 G/DL (ref 5.7–8.2)
PROT UR STRIP-MCNC: ABNORMAL MG/DL
RBC # UR STRIP: NEGATIVE /UL
SODIUM SERPL-SCNC: 143 MMOL/L (ref 132–146)
SP GR UR: 1.03 (ref 1–1.03)
UROBILINOGEN UR QL: NORMAL

## 2018-01-12 PROCEDURE — 99213 OFFICE O/P EST LOW 20 MIN: CPT | Performed by: NURSE PRACTITIONER

## 2018-01-12 RX ORDER — FLUCONAZOLE 150 MG/1
150 TABLET ORAL ONCE
Qty: 1 TABLET | Refills: 0 | Status: SHIPPED | OUTPATIENT
Start: 2018-01-12 | End: 2018-01-12

## 2018-01-12 NOTE — PROGRESS NOTES
Subjective   Bladimir Bah is a 37 y.o. female.     Urinary Frequency    This is a new problem. The current episode started 1 to 4 weeks ago. The problem has been unchanged. The patient is experiencing no pain. There has been no fever. She is sexually active. There is no history of pyelonephritis. Associated symptoms include a discharge, frequency, nausea and urgency. Pertinent negatives include no chills, flank pain, hematuria, possible pregnancy or vomiting. Associated symptoms comments: Very thirsty. She has tried increased fluids for the symptoms. The treatment provided no relief. There is no history of urinary stasis or a urological procedure.   urinary urgency and frequency and feeling very thirsty for the past 3 weeks, no burning with urination  + hx of hyperglycemia on frequent labs, no personal hx of DM type 2 but + family hx of DM  Drinking a lot of Koolaide to try to quench thirst  No back pain, no weight loss, some nausea no fever or chills, no abdominal pain  Vaginal itching, feels like has a yeast infection, used OTC topical but still feeling symptomatic, no recent abx use    The following portions of the patient's history were reviewed and updated as appropriate: allergies, current medications, past family history, past medical history, past social history, past surgical history and problem list.    Review of Systems   Constitutional: Negative.  Negative for chills.   HENT: Negative.    Eyes: Negative.    Respiratory: Negative.    Cardiovascular: Negative.    Gastrointestinal: Positive for nausea. Negative for vomiting.   Endocrine: Positive for polydipsia, polyphagia and polyuria.   Genitourinary: Positive for frequency, urgency and vaginal discharge. Negative for difficulty urinating, dysuria, flank pain and hematuria.   Musculoskeletal: Negative.    Skin: Negative.    Neurological: Negative for dizziness, light-headedness and headaches.   Hematological: Negative.    Psychiatric/Behavioral:  Negative.        Objective   Physical Exam   Constitutional: She is oriented to person, place, and time. She appears well-developed and well-nourished.   HENT:   Head: Normocephalic.   Cardiovascular: Normal rate, regular rhythm and normal heart sounds.    Pulmonary/Chest: Effort normal and breath sounds normal.   Abdominal: Soft. There is no tenderness. There is no CVA tenderness.   Neurological: She is alert and oriented to person, place, and time.   Skin: Skin is warm and dry.   Nursing note and vitals reviewed.      Assessment/Plan   Bladimir was seen today for polyuria & nausea.    Diagnoses and all orders for this visit:    Urinary frequency  -     POCT urinalysis dipstick, automated  -     Cancel: Urinalysis - Urine, Clean Catch  -     Urine Culture - Urine, Urine, Clean Catch    Hyperglycemia  -     Comprehensive metabolic panel  -     Hemoglobin A1c    Vaginal discharge  -     fluconazole (DIFLUCAN) 150 MG tablet; Take 1 tablet by mouth 1 (One) Time for 1 dose.      Will check for DM on labs today and notify ptof results  UA negative for nitrates trace of leuks will send for urine culture  Will treat with Diflucan but if sx persist recommend pt see Gyn. Pt agrees.

## 2018-01-14 LAB
BACTERIA UR CULT: NORMAL
BACTERIA UR CULT: NORMAL

## 2018-01-16 RX ORDER — TIZANIDINE 4 MG/1
4 TABLET ORAL EVERY 8 HOURS PRN
Qty: 30 TABLET | Refills: 0 | Status: SHIPPED | OUTPATIENT
Start: 2018-01-16 | End: 2018-02-14

## 2018-01-17 DIAGNOSIS — R35.0 INCREASED URINARY FREQUENCY: Primary | ICD-10-CM

## 2018-01-17 DIAGNOSIS — R39.15 URGENCY OF URINATION: ICD-10-CM

## 2018-01-29 ENCOUNTER — OFFICE VISIT (OUTPATIENT)
Dept: FAMILY MEDICINE CLINIC | Facility: CLINIC | Age: 38
End: 2018-01-29

## 2018-01-29 VITALS
HEIGHT: 66 IN | SYSTOLIC BLOOD PRESSURE: 118 MMHG | DIASTOLIC BLOOD PRESSURE: 74 MMHG | RESPIRATION RATE: 18 BRPM | BODY MASS INDEX: 31.34 KG/M2 | TEMPERATURE: 97.3 F | WEIGHT: 195 LBS | HEART RATE: 76 BPM

## 2018-01-29 DIAGNOSIS — R50.9 FEVER, UNSPECIFIED FEVER CAUSE: ICD-10-CM

## 2018-01-29 DIAGNOSIS — R11.0 NAUSEA: ICD-10-CM

## 2018-01-29 DIAGNOSIS — Z72.0 TOBACCO USE: ICD-10-CM

## 2018-01-29 DIAGNOSIS — J02.9 ACUTE PHARYNGITIS, UNSPECIFIED ETIOLOGY: Primary | ICD-10-CM

## 2018-01-29 DIAGNOSIS — R52 BODY ACHES: ICD-10-CM

## 2018-01-29 DIAGNOSIS — R68.89 FLU-LIKE SYMPTOMS: ICD-10-CM

## 2018-01-29 LAB
EXPIRATION DATE: NORMAL
EXPIRATION DATE: NORMAL
FLUAV AG NPH QL: NORMAL
FLUBV AG NPH QL: NORMAL
INTERNAL CONTROL: NORMAL
INTERNAL CONTROL: NORMAL
Lab: NORMAL
Lab: NORMAL
S PYO AG THROAT QL: NEGATIVE

## 2018-01-29 PROCEDURE — 87804 INFLUENZA ASSAY W/OPTIC: CPT | Performed by: PHYSICIAN ASSISTANT

## 2018-01-29 PROCEDURE — 87880 STREP A ASSAY W/OPTIC: CPT | Performed by: PHYSICIAN ASSISTANT

## 2018-01-29 PROCEDURE — 99213 OFFICE O/P EST LOW 20 MIN: CPT | Performed by: PHYSICIAN ASSISTANT

## 2018-01-29 RX ORDER — PROMETHAZINE HYDROCHLORIDE 12.5 MG/1
12.5-25 TABLET ORAL EVERY 6 HOURS PRN
Qty: 20 TABLET | Refills: 0 | Status: SHIPPED | OUTPATIENT
Start: 2018-01-29 | End: 2018-06-07

## 2018-01-29 RX ORDER — OSELTAMIVIR PHOSPHATE 75 MG/1
75 CAPSULE ORAL 2 TIMES DAILY
Qty: 10 CAPSULE | Refills: 0 | Status: SHIPPED | OUTPATIENT
Start: 2018-01-29 | End: 2018-02-14

## 2018-01-29 RX ORDER — METHYLPREDNISOLONE 4 MG/1
TABLET ORAL
Qty: 21 EACH | Refills: 0 | Status: SHIPPED | OUTPATIENT
Start: 2018-01-29 | End: 2018-02-14

## 2018-01-29 NOTE — PROGRESS NOTES
Subjective   Bladimir Bah is a 38 y.o. female.     History of Present Illness   Pt presents with CC of sore throat (hurts to swallow), fever of 102 this am (took tylenol), bilateral earache, nausea, and body aches. Symptoms started two days ago.     The following portions of the patient's history were reviewed and updated as appropriate: allergies, current medications, past family history, past medical history, past social history, past surgical history and problem list.    Review of Systems   Constitutional: Positive for chills, fatigue and fever. Negative for diaphoresis.   HENT: Positive for congestion, postnasal drip, rhinorrhea, sore throat and trouble swallowing. Negative for ear discharge, ear pain, hearing loss, nosebleeds, sinus pain, sinus pressure and sneezing.    Eyes: Negative.    Respiratory: Negative.  Negative for cough, chest tightness, shortness of breath and wheezing.    Cardiovascular: Negative.  Negative for chest pain, palpitations and leg swelling.   Gastrointestinal: Positive for nausea. Negative for abdominal distention, abdominal pain, anal bleeding, blood in stool, constipation, diarrhea, rectal pain and vomiting.   Endocrine: Negative.  Negative for cold intolerance, heat intolerance, polydipsia, polyphagia and polyuria.   Genitourinary: Negative.  Negative for difficulty urinating, dysuria, flank pain, frequency, hematuria and urgency.   Musculoskeletal: Positive for myalgias. Negative for arthralgias, back pain, gait problem, joint swelling, neck pain and neck stiffness.   Skin: Negative.  Negative for color change, pallor, rash and wound.   Allergic/Immunologic: Negative.  Negative for immunocompromised state.   Neurological: Negative for dizziness, syncope, weakness, light-headedness, numbness and headaches.   Hematological: Negative.  Negative for adenopathy. Does not bruise/bleed easily.   Psychiatric/Behavioral: Negative.  Negative for behavioral problems, confusion,  "self-injury, sleep disturbance and suicidal ideas. The patient is not nervous/anxious.        Objective    Blood pressure 118/74, pulse 76, temperature 97.3 °F (36.3 °C), resp. rate 18, height 167.6 cm (66\"), weight 88.5 kg (195 lb).     Physical Exam   Constitutional: She is oriented to person, place, and time. She appears well-developed and well-nourished. She appears ill.   HENT:   Head: Normocephalic and atraumatic.   Right Ear: Tympanic membrane and external ear normal.   Left Ear: Tympanic membrane and external ear normal.   Nose: Mucosal edema and rhinorrhea present. Right sinus exhibits no maxillary sinus tenderness and no frontal sinus tenderness. Left sinus exhibits no maxillary sinus tenderness and no frontal sinus tenderness.   Mouth/Throat: Posterior oropharyngeal erythema present. No oropharyngeal exudate or posterior oropharyngeal edema.   Excess cerumen in ear canals bilaterally, TM still visible and normal bilaterally    Eyes: Conjunctivae and EOM are normal. Pupils are equal, round, and reactive to light.   Neck: Normal range of motion. Neck supple. No tracheal deviation present. No thyromegaly present.   Cardiovascular: Normal rate, regular rhythm, normal heart sounds and intact distal pulses.  Exam reveals no gallop and no friction rub.    No murmur heard.  Pulmonary/Chest: Effort normal and breath sounds normal. No respiratory distress. She has no wheezes. She has no rales. She exhibits no tenderness.   Abdominal: Soft. Bowel sounds are normal. She exhibits no distension and no mass. There is no tenderness. There is no rebound and no guarding. No hernia.   Lymphadenopathy:     She has no cervical adenopathy.   Neurological: She is alert and oriented to person, place, and time.   Skin: Skin is warm and dry.   Psychiatric: She has a normal mood and affect. Her behavior is normal. Judgment and thought content normal.   Nursing note and vitals reviewed.      Assessment/Plan   Bladimir was seen today " for generalized body aches.    Diagnoses and all orders for this visit:    Acute pharyngitis, unspecified etiology  -     POCT rapid strep A  -     MethylPREDNISolone (MEDROL, PHYLLIS,) 4 MG tablet; Take as directed on package instructions.    Body aches  -     POC Influenza A / B    Fever, unspecified fever cause  -     POC Influenza A / B    Tobacco use    BMI 31.0-31.9,adult    Flu-like symptoms  -     oseltamivir (TAMIFLU) 75 MG capsule; Take 1 capsule by mouth 2 (Two) Times a Day.  -     MethylPREDNISolone (MEDROL, PHYLLIS,) 4 MG tablet; Take as directed on package instructions.    Nausea  -     promethazine (PHENERGAN) 12.5 MG tablet; Take 1-2 tablets by mouth Every 6 (Six) Hours As Needed for Nausea or Vomiting.    Flu A and B as well as strep negative. Due to current Flu like symptoms will begin tamiflu as directed along with symptom management. Work note provided. F/U INB     I advised the patient of the risks in continuing to use tobacco, and I provided this patient with smoking cessation educational materials.  I also discussed how to quit smoking and the patient has expressed the willingness to quit.      During this visit, I spent less than 3 minutes counseling the patient regarding smoking cessation.

## 2018-02-02 ENCOUNTER — TELEPHONE (OUTPATIENT)
Dept: FAMILY MEDICINE CLINIC | Facility: CLINIC | Age: 38
End: 2018-02-02

## 2018-02-02 NOTE — TELEPHONE ENCOUNTER
----- Message from Betty Ruano sent at 2/2/2018  3:28 PM EST -----  Contact: [facility]   PT STILL IS NOT FEELING WELL. SHE STILL HAS A SORE THROAT, CHEST HURTS WHEN SHE BREATHS IN. SHE ONLY HAS TWO PILLS LEFT OF THE ZANAFLEX.  SHE WOULD LIKE TO KNOW HWAT ELSE WE COUD DO FOR HER.     CALL BACK   [phone]

## 2018-02-05 RX ORDER — ALBUTEROL SULFATE 90 UG/1
2 AEROSOL, METERED RESPIRATORY (INHALATION) EVERY 4 HOURS PRN
Qty: 1 INHALER | Refills: 0 | Status: SHIPPED | OUTPATIENT
Start: 2018-02-05 | End: 2018-06-07

## 2018-02-05 RX ORDER — CEFDINIR 300 MG/1
300 CAPSULE ORAL 2 TIMES DAILY
Qty: 20 CAPSULE | Refills: 0 | Status: SHIPPED | OUTPATIENT
Start: 2018-02-05 | End: 2018-02-14

## 2018-02-05 NOTE — TELEPHONE ENCOUNTER
Will send in antibiotic and albuterol inhaler for continued symptoms. Call if no improvement after completing or report to ER if any new or worsening symptoms develop. SHERLY

## 2018-02-06 ENCOUNTER — TELEPHONE (OUTPATIENT)
Dept: FAMILY MEDICINE CLINIC | Facility: CLINIC | Age: 38
End: 2018-02-06

## 2018-02-06 NOTE — TELEPHONE ENCOUNTER
We do not give disability permits through our office. She may  the application that needs to be completed by patient as well as taken to [facility] clerks office for final approval. I will fill out the provider section.     Letter upfront for IBS as well. SHERLY

## 2018-02-06 NOTE — TELEPHONE ENCOUNTER
----- Message from Elizabeth Galvan sent at 2/6/2018 11:50 AM EST -----  Contact: GRIS;PT CALLED  PT IS REQUESTING A HANDICAP PARKING TICKET-SHE CAN NOT GET OFF WORK  TO COME BY-SHE WILL HAVE HER SISTER PICK IT UP    ALSO ASKING IF YOU WOULD WRITE HER A NOTE ABOUT HER IBS FOR WORK-  SO SHE WILL NOT BE PENALIZED  FOR BEING IN THE BATHROOM TOO LONG  AT WORK    [phone]  MESSAGE OK

## 2018-02-07 NOTE — TELEPHONE ENCOUNTER
I already filled out the application provider part and it is up front in the brown folders. Just wanted the patient to be aware this is not the actual permit as she was requesting. SHERLY

## 2018-02-14 ENCOUNTER — OFFICE VISIT (OUTPATIENT)
Dept: FAMILY MEDICINE CLINIC | Facility: CLINIC | Age: 38
End: 2018-02-14

## 2018-02-14 VITALS
BODY MASS INDEX: 32.22 KG/M2 | TEMPERATURE: 97.8 F | HEART RATE: 95 BPM | OXYGEN SATURATION: 99 % | WEIGHT: 200.5 LBS | RESPIRATION RATE: 14 BRPM | DIASTOLIC BLOOD PRESSURE: 94 MMHG | SYSTOLIC BLOOD PRESSURE: 128 MMHG | HEIGHT: 66 IN

## 2018-02-14 DIAGNOSIS — G43.A0 CYCLICAL VOMITING WITH NAUSEA, INTRACTABILITY OF VOMITING NOT SPECIFIED: Primary | ICD-10-CM

## 2018-02-14 DIAGNOSIS — K29.70 VIRAL GASTRITIS: ICD-10-CM

## 2018-02-14 LAB
BILIRUB BLD-MCNC: NEGATIVE MG/DL
CLARITY, POC: CLEAR
COLOR UR: YELLOW
GLUCOSE UR STRIP-MCNC: NEGATIVE MG/DL
KETONES UR QL: NEGATIVE
LEUKOCYTE EST, POC: NEGATIVE
NITRITE UR-MCNC: NEGATIVE MG/ML
PH UR: 5.5 [PH] (ref 5–8)
PROT UR STRIP-MCNC: NEGATIVE MG/DL
RBC # UR STRIP: NEGATIVE /UL
SP GR UR: 1.03 (ref 1–1.03)
UROBILINOGEN UR QL: NORMAL

## 2018-02-14 PROCEDURE — 99213 OFFICE O/P EST LOW 20 MIN: CPT | Performed by: PHYSICIAN ASSISTANT

## 2018-02-14 RX ORDER — NAPROXEN 500 MG/1
TABLET ORAL
COMMUNITY
Start: 2018-02-09 | End: 2018-06-07

## 2018-02-14 RX ORDER — CYCLOBENZAPRINE HCL 10 MG
TABLET ORAL
COMMUNITY
Start: 2018-02-09 | End: 2018-03-15

## 2018-02-14 RX ORDER — OXYBUTYNIN CHLORIDE 5 MG/1
TABLET, EXTENDED RELEASE ORAL
COMMUNITY
Start: 2018-02-13 | End: 2018-03-15

## 2018-02-14 NOTE — PROGRESS NOTES
Subjective   Bladimir Bah is a 38 y.o. female.     History of Present Illness   Pt presents after work sent her home today. Yesterday, while at work, she had a bad episode of vomiting. She had eaten tortilla chips and meatballs before symptoms started. Also stomach virus going around at work. Was sent home yesterday. Hx of IBS so had zofran and phenergan at home. No vomiting today. Went to work this morning, still had some stomach cramps and nausea but no vomiting. At lunch went to her car and fell asleep, so was sent home again today. Very fatigued.   Pt very worried she is going to lose her job. Had to take off first week she was hired due to illness.     The following portions of the patient's history were reviewed and updated as appropriate: allergies, current medications, past family history, past medical history, past social history, past surgical history and problem list.    Review of Systems   Constitutional: Positive for fatigue. Negative for chills, diaphoresis and fever.   HENT: Negative.  Negative for congestion, ear discharge, ear pain, hearing loss, nosebleeds, postnasal drip, sinus pressure, sneezing and sore throat.    Eyes: Negative.    Respiratory: Negative.  Negative for cough, chest tightness, shortness of breath and wheezing.    Cardiovascular: Negative.  Negative for chest pain, palpitations and leg swelling.   Gastrointestinal: Positive for constipation, nausea and vomiting. Negative for abdominal distention, abdominal pain, anal bleeding, blood in stool, diarrhea and rectal pain.   Endocrine: Negative.  Negative for cold intolerance, heat intolerance, polydipsia, polyphagia and polyuria.   Genitourinary: Negative.  Negative for difficulty urinating, dysuria, flank pain, frequency, hematuria and urgency.   Skin: Negative.  Negative for color change, pallor, rash and wound.   Allergic/Immunologic: Negative.  Negative for immunocompromised state.   Neurological: Negative for dizziness,  "syncope, weakness, light-headedness, numbness and headaches.   Hematological: Negative.  Negative for adenopathy. Does not bruise/bleed easily.   Psychiatric/Behavioral: Negative.  Negative for behavioral problems, confusion, self-injury, sleep disturbance and suicidal ideas. The patient is not nervous/anxious.        Objective    Blood pressure 128/94, pulse 95, temperature 97.8 °F (36.6 °C), temperature source Temporal Artery , resp. rate 14, height 167.6 cm (65.98\"), weight 90.9 kg (200 lb 8 oz), SpO2 99 %.     Physical Exam   Constitutional: She is oriented to person, place, and time. She appears well-developed and well-nourished.   HENT:   Head: Normocephalic and atraumatic.   Right Ear: External ear normal.   Left Ear: External ear normal.   Nose: Nose normal.   Mouth/Throat: Oropharynx is clear and moist. No oropharyngeal exudate.   Eyes: Conjunctivae and EOM are normal. Pupils are equal, round, and reactive to light.   Neck: Normal range of motion. Neck supple. No tracheal deviation present. No thyromegaly present.   Cardiovascular: Normal rate, regular rhythm, normal heart sounds and intact distal pulses.  Exam reveals no gallop and no friction rub.    No murmur heard.  Pulmonary/Chest: Effort normal and breath sounds normal. No respiratory distress. She has no wheezes. She has no rales. She exhibits no tenderness.   Abdominal: Soft. Bowel sounds are normal. She exhibits no distension and no mass. There is no tenderness. There is no rebound and no guarding. No hernia.   Musculoskeletal: Normal range of motion. She exhibits no edema, tenderness or deformity.   Lymphadenopathy:     She has no cervical adenopathy.   Neurological: She is alert and oriented to person, place, and time.   Skin: Skin is warm and dry.   Psychiatric: Her behavior is normal. Judgment and thought content normal.   tearful   Nursing note and vitals reviewed.      Assessment/Plan   Bladimir was seen today for vomiting.    Diagnoses and " all orders for this visit:    Cyclical vomiting with nausea, intractability of vomiting not specified  -     POCT urinalysis dipstick, automated    Viral gastritis    UA normal. Continue symptomatic treatment. Release back to work tomorrow.

## 2018-02-28 ENCOUNTER — OFFICE VISIT (OUTPATIENT)
Dept: FAMILY MEDICINE CLINIC | Facility: CLINIC | Age: 38
End: 2018-02-28

## 2018-02-28 ENCOUNTER — TELEPHONE (OUTPATIENT)
Dept: FAMILY MEDICINE CLINIC | Facility: CLINIC | Age: 38
End: 2018-02-28

## 2018-02-28 VITALS
HEIGHT: 66 IN | TEMPERATURE: 96.8 F | SYSTOLIC BLOOD PRESSURE: 124 MMHG | HEART RATE: 88 BPM | OXYGEN SATURATION: 99 % | RESPIRATION RATE: 16 BRPM | BODY MASS INDEX: 32.47 KG/M2 | WEIGHT: 202 LBS | DIASTOLIC BLOOD PRESSURE: 82 MMHG

## 2018-02-28 DIAGNOSIS — K58.0 IRRITABLE BOWEL SYNDROME WITH DIARRHEA: Primary | ICD-10-CM

## 2018-02-28 DIAGNOSIS — R11.15 NON-INTRACTABLE CYCLICAL VOMITING WITH NAUSEA: ICD-10-CM

## 2018-02-28 DIAGNOSIS — R14.0 ABDOMINAL BLOATING: ICD-10-CM

## 2018-02-28 PROCEDURE — 99213 OFFICE O/P EST LOW 20 MIN: CPT | Performed by: NURSE PRACTITIONER

## 2018-02-28 RX ORDER — MONTELUKAST SODIUM 4 MG/1
1-2 TABLET, CHEWABLE ORAL 2 TIMES DAILY
Qty: 120 TABLET | Refills: 1 | Status: SHIPPED | OUTPATIENT
Start: 2018-02-28 | End: 2018-03-15

## 2018-02-28 NOTE — PROGRESS NOTES
Subjective   Bladimir Bah is a 38 y.o. female.     History of Present Illness   Abdominal bloating and N/V, diarrhea  Just started anew job 3-4 weeks ago, has missed a day each week due to her abdominal issues  She has hx of IBS diarrhea  She has worse diarrhea after having her GB removed  She takes Lomotil which helps some with her diarrhea but not completely resolve the problems  She takes Dicyclomine which she says does not help  She takes Promethazine and Zofran for nausea but she says they make her sleepy  She has not seen GI specialist since she had her GB removed  She has seen Urology recently who did a KUB and US of her kidney (+ hx of kidney stones)  She is very emotional and frustrated she says she just keeps getting sick all the time but does not know why    The following portions of the patient's history were reviewed and updated as appropriate: allergies, current medications, past family history, past medical history, past social history, past surgical history and problem list.    Review of Systems   Constitutional: Negative.  Negative for appetite change, chills and fever.   Respiratory: Negative.    Cardiovascular: Negative.    Gastrointestinal: Positive for abdominal distention, diarrhea, nausea and vomiting.   Genitourinary: Negative for difficulty urinating.   Musculoskeletal: Negative for back pain.   Neurological: Positive for headaches (when she does not eat ).   Psychiatric/Behavioral: Positive for dysphoric mood and sleep disturbance. The patient is nervous/anxious.        Objective   Physical Exam   Constitutional: She is oriented to person, place, and time. She appears well-developed and well-nourished. No distress.   Cardiovascular: Normal rate, regular rhythm and normal heart sounds.    Pulmonary/Chest: Effort normal and breath sounds normal.   Abdominal: Soft. She exhibits distension. She exhibits no mass. There is no rebound and no guarding. No hernia.   Neurological: She is alert and  oriented to person, place, and time.   Skin: Skin is warm and dry.   Psychiatric: Her speech is normal and behavior is normal. Judgment and thought content normal. Angry: crying and frustrated  Cognition and memory are normal. She exhibits a depressed mood.   Nursing note and vitals reviewed.      Assessment/Plan   Bladimir was seen today for abdominal pain and nausea.    Diagnoses and all orders for this visit:    Irritable bowel syndrome with diarrhea  -     Cancel: XR Abdomen KUB    Abdominal bloating  -     Cancel: XR Abdomen KUB    Non-intractable cyclical vomiting with nausea  -     Cancel: XR Abdomen KUB    Other orders  -     colestipol (COLESTID) 1 g tablet; Take 1-2 tablets by mouth 2 (Two) Times a Day.    Discussed potential side effects of constipation with pt regarding Colestid may need to adjust to take less or more meds    Will check results of recent KUB of abdomen pt had by Urologist at [facility] and start pt on Colestid for diarrhea  Discussed with pt that I do not want to refill Lomotil at this time so we do not cause constipation.   Will have pt follow up if not improving will refer to GI. Pt will call back      KUB from [facility] reviewed as normal XR. ajc

## 2018-02-28 NOTE — PATIENT INSTRUCTIONS
Irritable Bowel Syndrome, Adult  Irritable bowel syndrome (IBS) is not one specific disease. It is a group of symptoms that affects the organs responsible for digestion (gastrointestinal or GI tract).  To regulate how your GI tract works, your body sends signals back and forth between your intestines and your brain. If you have IBS, there may be a problem with these signals. As a result, your GI tract does not function normally. Your intestines may become more sensitive and overreact to certain things. This is especially true when you eat certain foods or when you are under stress.  There are four types of IBS. These may be determined based on the consistency of your stool:  · IBS with diarrhea.  · IBS with constipation.  · Mixed IBS.  · Unsubtyped IBS.  It is important to know which type of IBS you have. Some treatments are more likely to be helpful for certain types of IBS.  What are the causes?  The exact cause of IBS is not known.  What increases the risk?  You may have a higher risk of IBS if:  · You are a woman.  · You are younger than 45 years old.  · You have a family history of IBS.  · You have mental health problems.  · You have had bacterial infection of your GI tract.  What are the signs or symptoms?  Symptoms of IBS vary from person to person. The main symptom is abdominal pain or discomfort. Additional symptoms usually include one or more of the following:  · Diarrhea, constipation, or both.  · Abdominal swelling or bloating.  · Feeling full or sick after eating a small or regular-size meal.  · Frequent gas.  · Mucus in the stool.  · A feeling of having more stool left after a bowel movement.  Symptoms tend to come and go. They may be associated with stress, psychiatric conditions, or nothing at all.  How is this diagnosed?  There is no specific test to diagnose IBS. Your health care provider will make a diagnosis based on a physical exam, medical history, and your symptoms. You may have other tests to  rule out other conditions that may be causing your symptoms. These may include:  · Blood tests.  · X-rays.  · CT scan.  · Endoscopy and colonoscopy. This is a test in which your GI tract is viewed with a long, thin, flexible tube.  How is this treated?  There is no cure for IBS, but treatment can help relieve symptoms. IBS treatment often includes:  · Changes to your diet, such as:  ¨ Eating more fiber.  ¨ Avoiding foods that cause symptoms.  ¨ Drinking more water.  ¨ Eating regular, medium-sized portioned meals.  · Medicines. These may include:  ¨ Fiber supplements if you have constipation.  ¨ Medicine to control diarrhea (antidiarrheal medicines).  ¨ Medicine to help control muscle spasms in your GI tract (antispasmodic medicines).  ¨ Medicines to help with any mental health issues, such as antidepressants or tranquilizers.  · Therapy.  ¨ Talk therapy may help with anxiety, depression, or other mental health issues that can make IBS symptoms worse.  · Stress reduction.  ¨ Managing your stress can help keep symptoms under control.  Follow these instructions at home:  · Take medicines only as directed by your health care provider.  · Eat a healthy diet.  ¨ Avoid foods and drinks with added sugar.  ¨ Include more whole grains, fruits, and vegetables gradually into your diet. This may be especially helpful if you have IBS with constipation.  ¨ Avoid any foods and drinks that make your symptoms worse. These may include dairy products and caffeinated or carbonated drinks.  ¨ Do not eat large meals.  ¨ Drink enough fluid to keep your urine clear or pale yellow.  · Exercise regularly. Ask your health care provider for recommendations of good activities for you.  · Keep all follow-up visits as directed by your health care provider. This is important.  Contact a health care provider if:  · You have constant pain.  · You have trouble or pain with swallowing.  · You have worsening diarrhea.  Get help right away if:  · You  have severe and worsening abdominal pain.  · You have diarrhea and:  ¨ You have a rash, stiff neck, or severe headache.  ¨ You are irritable, sleepy, or difficult to awaken.  ¨ You are weak, dizzy, or extremely thirsty.  · You have bright red blood in your stool or you have black tarry stools.  · You have unusual abdominal swelling that is painful.  · You vomit continuously.  · You vomit blood (hematemesis).  · You have both abdominal pain and a fever.  This information is not intended to replace advice given to you by your health care provider. Make sure you discuss any questions you have with your health care provider.  Document Released: 12/18/2006 Document Revised: 05/19/2017 Document Reviewed: 09/04/2015  [employer] Interactive Patient Education © 2017 [employer]

## 2018-03-01 ENCOUNTER — TELEPHONE (OUTPATIENT)
Dept: FAMILY MEDICINE CLINIC | Facility: CLINIC | Age: 38
End: 2018-03-01

## 2018-03-01 DIAGNOSIS — R14.0 ABDOMINAL BLOATING: ICD-10-CM

## 2018-03-01 DIAGNOSIS — K58.0 IRRITABLE BOWEL SYNDROME WITH DIARRHEA: Primary | ICD-10-CM

## 2018-03-15 ENCOUNTER — OFFICE VISIT (OUTPATIENT)
Dept: FAMILY MEDICINE CLINIC | Facility: CLINIC | Age: 38
End: 2018-03-15

## 2018-03-15 VITALS
HEART RATE: 76 BPM | SYSTOLIC BLOOD PRESSURE: 115 MMHG | WEIGHT: 197 LBS | HEIGHT: 66 IN | TEMPERATURE: 97.6 F | RESPIRATION RATE: 16 BRPM | DIASTOLIC BLOOD PRESSURE: 80 MMHG | BODY MASS INDEX: 31.66 KG/M2

## 2018-03-15 DIAGNOSIS — M25.511 ACUTE PAIN OF RIGHT SHOULDER: Primary | ICD-10-CM

## 2018-03-15 DIAGNOSIS — F41.9 ANXIETY: ICD-10-CM

## 2018-03-15 PROCEDURE — 99213 OFFICE O/P EST LOW 20 MIN: CPT | Performed by: NURSE PRACTITIONER

## 2018-03-15 RX ORDER — CHOLESTYRAMINE 4 G/9G
POWDER, FOR SUSPENSION ORAL
COMMUNITY
Start: 2018-03-07 | End: 2018-06-07

## 2018-03-15 RX ORDER — PREDNISONE 20 MG/1
20 TABLET ORAL DAILY
Qty: 10 TABLET | Refills: 0 | Status: SHIPPED | OUTPATIENT
Start: 2018-03-15 | End: 2018-05-22 | Stop reason: SDUPTHER

## 2018-03-15 RX ORDER — CLONAZEPAM 0.5 MG/1
0.5 TABLET ORAL 2 TIMES DAILY PRN
Qty: 60 TABLET | Refills: 0 | Status: SHIPPED | OUTPATIENT
Start: 2018-03-15 | End: 2018-11-29

## 2018-03-15 RX ORDER — POLYETHYLENE GLYCOL 3350 17 G/17G
17 POWDER, FOR SOLUTION ORAL DAILY
Qty: 30 EACH | Refills: 0 | Status: SHIPPED | OUTPATIENT
Start: 2018-03-15 | End: 2018-06-07

## 2018-03-15 NOTE — PROGRESS NOTES
Subjective   Bladimir Bah is a 38 y.o. female.     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   Recently lost her job due to abdominal pain/diarrhea and missed days, thinks her IBS is worse due to stress  Duloxetine does not seem to be helping  Has taken other meds for depression and anxiety in the past    Having some constipation currently    The following portions of the patient's history were reviewed and updated as appropriate: allergies, current medications, past family history, past medical history, past social history, past surgical history and problem list.    Review of Systems   Constitutional: Positive for activity change.   Gastrointestinal: Positive for constipation and diarrhea. Negative for abdominal pain.   Musculoskeletal: Positive for arthralgias and myalgias.   Skin: Negative for color change.   Neurological: Negative for headaches.   Psychiatric/Behavioral: The patient is nervous/anxious.        Objective   Physical Exam   Constitutional: She is oriented to person, place, and time. She appears well-developed.   Cardiovascular: Normal rate, regular rhythm and normal heart sounds.    Pulmonary/Chest: Effort normal and breath sounds normal.   Musculoskeletal: She exhibits tenderness.        Right shoulder: She exhibits decreased range of motion and tenderness.   Difficulty to assess the ROM and pain as her reaction is to cry out in pain with gentle palpation and AROM   Neurological: She is alert and oriented to person, place, and time.   Psychiatric: Her speech is normal and behavior is normal. Her mood appears anxious. Cognition and memory are normal. She exhibits a depressed mood.    Nursing note and vitals reviewed.        Assessment/Plan   Bladimir was seen today for right arm pain.    Diagnoses and all orders for this visit:    Acute pain of right shoulder  -     predniSONE (DELTASONE) 20 MG tablet; Take 1 tablet by mouth Daily.    Anxiety  -     clonazePAM (KLONOPIN) 0.5 MG tablet; Take 1 tablet by mouth 2 (Two) Times a Day As Needed for Anxiety.    Other orders  -     polyethylene glycol (MIRALAX) packet; Take 17 g by mouth Daily.    I did inform Ms Bah that I will give her some Clonazepam to use prn anxiety but I will not continue to prescribe this on an ongoing basis. If she needs to continue this medication she will need to see Psychiatry for this. Pt verbalizes understanding. Use with caution as this can cause drowsiness. Pt agrees.

## 2018-05-22 ENCOUNTER — OFFICE VISIT (OUTPATIENT)
Dept: FAMILY MEDICINE CLINIC | Facility: CLINIC | Age: 38
End: 2018-05-22

## 2018-05-22 VITALS
SYSTOLIC BLOOD PRESSURE: 130 MMHG | RESPIRATION RATE: 20 BRPM | HEIGHT: 66 IN | DIASTOLIC BLOOD PRESSURE: 82 MMHG | TEMPERATURE: 97.5 F | HEART RATE: 100 BPM | BODY MASS INDEX: 30.82 KG/M2 | WEIGHT: 191.8 LBS

## 2018-05-22 DIAGNOSIS — J98.01 COUGH DUE TO BRONCHOSPASM: ICD-10-CM

## 2018-05-22 DIAGNOSIS — N89.8 VAGINAL DISCHARGE: ICD-10-CM

## 2018-05-22 DIAGNOSIS — Z72.0 TOBACCO USE: ICD-10-CM

## 2018-05-22 DIAGNOSIS — J18.9 WALKING PNEUMONIA: Primary | ICD-10-CM

## 2018-05-22 PROCEDURE — 99406 BEHAV CHNG SMOKING 3-10 MIN: CPT | Performed by: NURSE PRACTITIONER

## 2018-05-22 PROCEDURE — 99214 OFFICE O/P EST MOD 30 MIN: CPT | Performed by: NURSE PRACTITIONER

## 2018-05-22 PROCEDURE — 94640 AIRWAY INHALATION TREATMENT: CPT | Performed by: NURSE PRACTITIONER

## 2018-05-22 NOTE — PROGRESS NOTES
Subjective   Bladimir Bah is a 38 y.o. female.     37 y/o c/o of cough, chests congestion, chills, myalgias, chest discomfort during inspiration, nasal congestion, HA, sinus pressure, left ear pain with brown discharge, diarrhea approx 10x daily brown & denies blood or mucus x2 weeks; vaginal discharge thin, clear for one week, no odor, has hx of BV multiple times since [facility],  Denies fever. Tylenol Cold & Sinus, Nyquil, Robitussin, Zytrec with no relief.      Cough   This is a new problem. The current episode started 1 to 4 weeks ago. The problem has been gradually worsening. The problem occurs every few minutes. The cough is productive of sputum. Associated symptoms include chills, ear pain, headaches, myalgias, shortness of breath and wheezing. Pertinent negatives include no fever or rhinorrhea. The symptoms are aggravated by other (laying down). Risk factors for lung disease include smoking/tobacco exposure. She has tried body position changes and OTC cough suppressant for the symptoms. The treatment provided no relief.   URI    This is a new problem. The current episode started 1 to 4 weeks ago. The problem has been gradually improving. There has been no fever. Associated symptoms include congestion, coughing, diarrhea, dysuria, ear pain, headaches, sinus pain, sneezing and wheezing. Pertinent negatives include no nausea, rhinorrhea, swollen glands or vomiting. She has tried decongestant and acetaminophen for the symptoms. The treatment provided no relief.   Vaginal Discharge   The patient's primary symptoms include vaginal discharge. This is a new problem. The current episode started in the past 7 days. The problem occurs daily. The problem has been gradually worsening. The patient is experiencing no pain. Associated symptoms include chills, diarrhea, dysuria and headaches. Pertinent negatives include no fever, nausea or vomiting. The vaginal discharge was clear, thin and white. There has been no bleeding.  She has tried nothing for the symptoms. She is not sexually active. She uses hysterectomy for contraception.        {Common H&P Review Areas:35494}    Review of Systems   Constitutional: Positive for chills and fatigue. Negative for fever.   HENT: Positive for congestion, ear discharge, ear pain, sinus pressure, sneezing and voice change. Negative for rhinorrhea.    Eyes: Negative.    Respiratory: Positive for cough, chest tightness, shortness of breath and wheezing.    Cardiovascular: Negative.    Gastrointestinal: Positive for diarrhea. Negative for blood in stool, nausea and vomiting.   Endocrine: Negative.    Genitourinary: Positive for dysuria and vaginal discharge.   Musculoskeletal: Positive for myalgias.   Skin: Negative.    Allergic/Immunologic: Negative.    Neurological: Positive for headache.   Hematological: Negative.    Psychiatric/Behavioral: Positive for sleep disturbance, depressed mood and stress. Negative for self-injury and suicidal ideas.       Objective   Physical Exam      Assessment/Plan   {Assess/PlanSmartLinks:75395}

## 2018-05-22 NOTE — PROGRESS NOTES
Subjective   Bladimir Bah is a 38 y.o. female.     Cough   The current episode started 1 to 4 weeks ago. The problem has been gradually worsening. The problem occurs constantly. The cough is non-productive. Associated symptoms include chest pain, chills, ear pain, headaches, myalgias, shortness of breath and wheezing. Pertinent negatives include no fever. The symptoms are aggravated by lying down and exercise. Risk factors for lung disease include smoking/tobacco exposure. She has tried a beta-agonist inhaler, rest and OTC cough suppressant for the symptoms. The treatment provided no relief. Her past medical history is significant for bronchitis and environmental allergies.   Vaginal Discharge   The patient's primary symptoms include vaginal discharge. This is a new problem. The current episode started in the past 7 days. The problem occurs constantly. The problem has been unchanged. The patient is experiencing no pain. She is not pregnant. Associated symptoms include chills, diarrhea and headaches. Pertinent negatives include no abdominal pain, fever, nausea or vomiting. The vaginal discharge was clear, thin and mucopurulent. There has been no bleeding. Nothing aggravates the symptoms. She has tried nothing for the symptoms. The treatment provided no relief. She is not sexually active. She uses hysterectomy for contraception. Her past medical history is significant for vaginosis.      Cough and congestion for the past 2 weeks  Taking Allergy meds, Niquil, Robitussin  Nothing making it better    Vaginal discharge  For the past several days having discharge, + hx of BV  No abdominal pain      The following portions of the patient's history were reviewed and updated as appropriate: allergies, current medications, past family history, past medical history, past social history, past surgical history and problem list.    Review of Systems   Constitutional: Positive for activity change, chills and fatigue. Negative for  fever.   HENT: Positive for congestion, ear pain and voice change.    Eyes: Negative.    Respiratory: Positive for cough, chest tightness, shortness of breath and wheezing.    Cardiovascular: Positive for chest pain. Leg swelling: LLL.   Gastrointestinal: Positive for diarrhea. Negative for abdominal pain, nausea and vomiting.   Endocrine: Negative.    Genitourinary: Positive for vaginal discharge. Negative for difficulty urinating and vaginal pain.   Musculoskeletal: Positive for myalgias.   Allergic/Immunologic: Positive for environmental allergies.   Neurological: Positive for headache.   Hematological: Negative.  Negative for adenopathy.   Psychiatric/Behavioral: Positive for sleep disturbance, depressed mood and stress.       Objective   Physical Exam   Constitutional: She is oriented to person, place, and time. She appears well-developed and well-nourished.   HENT:   Head: Normocephalic.   Right Ear: Tympanic membrane, external ear and ear canal normal.   Left Ear: Tympanic membrane, external ear and ear canal normal.   Nose: Rhinorrhea present. No mucosal edema. Right sinus exhibits no maxillary sinus tenderness and no frontal sinus tenderness. Left sinus exhibits no maxillary sinus tenderness and no frontal sinus tenderness.   Mouth/Throat: Uvula is midline, oropharynx is clear and moist and mucous membranes are normal.   Eyes: Conjunctivae are normal.   Neck: Neck supple. No JVD present. No thyromegaly present.   Cardiovascular: Normal rate, regular rhythm, S1 normal, S2 normal and normal heart sounds.    Pulmonary/Chest: No respiratory distress. She has wheezes in the left lower field. She has rhonchi in the left lower field. She has rales in the left lower field.   Abdominal: Soft.   Musculoskeletal: Normal range of motion.   Lymphadenopathy:        Head (right side): No tonsillar, no preauricular, no posterior auricular and no occipital adenopathy present.        Head (left side): No tonsillar, no  preauricular, no posterior auricular and no occipital adenopathy present.     She has no cervical adenopathy.   Neurological: She is alert and oriented to person, place, and time.   Skin: Skin is warm and dry.   Psychiatric: Her speech is normal and behavior is normal. Judgment and thought content normal. Cognition and memory are normal. She exhibits a depressed mood.   Nursing note and vitals reviewed.        Assessment/Plan   Bladimir was seen today for cough, uri and vaginal discharge.    Diagnoses and all orders for this visit:    Walking pneumonia  -     azithromycin (ZITHROMAX) 250 MG tablet; Take 2 tablets the first day, then 1 tablet daily for 4 days.  -     predniSONE (DELTASONE) 20 MG tablet; Take 1 tablet by mouth 2 (Two) Times a Day.  -     benzonatate (TESSALON) 200 MG capsule; Take 1 capsule by mouth 3 (Three) Times a Day As Needed for Cough.    Cough due to bronchospasm    Vaginal discharge    BV (bacterial vaginosis)    Tobacco use      NP student advised the patient of the risks in continuing to use tobacco, and offered this patient with smoking cessation educational materials. She declines.    During this visit, she spent 5 minutes counseling the patient regarding smoking cessation.    In office neb with albuterol completed with pre O2 sat 99%, HR 89, wheezing in LLL, constant coughing  After neb treatment O2 sat 99%, HR 94, rales, rhonchi, wheezing continued in LLL, coughing had resolved. Pt tolerated well.    Will treat pt for walking pneumonia and see pt back in a week for follow up or sooner if needed or go to ER if difficulty breathing. Pt agrees.   Will reassess vaginal discharge at follow up and treat if needed

## 2018-05-23 RX ORDER — AZITHROMYCIN 250 MG/1
TABLET, FILM COATED ORAL
Qty: 6 TABLET | Refills: 0 | Status: SHIPPED | OUTPATIENT
Start: 2018-05-23 | End: 2018-06-07

## 2018-05-23 RX ORDER — BENZONATATE 200 MG/1
200 CAPSULE ORAL 3 TIMES DAILY PRN
Qty: 30 CAPSULE | Refills: 0 | Status: SHIPPED | OUTPATIENT
Start: 2018-05-23 | End: 2018-06-07

## 2018-05-23 RX ORDER — PREDNISONE 20 MG/1
20 TABLET ORAL 2 TIMES DAILY
Qty: 10 TABLET | Refills: 0 | Status: SHIPPED | OUTPATIENT
Start: 2018-05-23 | End: 2018-06-07

## 2018-05-24 ENCOUNTER — TELEPHONE (OUTPATIENT)
Dept: FAMILY MEDICINE CLINIC | Facility: CLINIC | Age: 38
End: 2018-05-24

## 2018-05-24 RX ORDER — BROMPHENIRAMINE MALEATE, PSEUDOEPHEDRINE HYDROCHLORIDE, AND DEXTROMETHORPHAN HYDROBROMIDE 2; 30; 10 MG/5ML; MG/5ML; MG/5ML
5 SYRUP ORAL 4 TIMES DAILY PRN
Qty: 120 ML | Refills: 0 | Status: SHIPPED | OUTPATIENT
Start: 2018-05-24 | End: 2018-06-07

## 2018-05-24 NOTE — TELEPHONE ENCOUNTER
Cough medicine sent to pharmacy. Drink plenty of fluids and use cool mist vaporizer, can also use Vicks vapor rub. aj

## 2018-05-24 NOTE — TELEPHONE ENCOUNTER
----- Message from Elizabeth Jean-Baptiste sent at 5/24/2018 12:08 PM EDT -----  Contact: JUAN MENDEZ  PATIENT JUST CALLED, CAN BARELY TALK FOR COUGHING, SO CHOKED UP.  SHE SAID THE COUGH MEDICATION IS NOT WORKING AT ALL. AND HER JOINTS ARE HURTING SO BAD SHE CAN HERRING WALK. CAN YOU CALL HER SOMETHING ELSE IN PLEASE.    [facility] IN [place]

## 2018-05-28 DIAGNOSIS — Z91.09 ENVIRONMENTAL ALLERGIES: ICD-10-CM

## 2018-05-29 RX ORDER — LEVOCETIRIZINE DIHYDROCHLORIDE 5 MG/1
5 TABLET, FILM COATED ORAL DAILY
Qty: 30 TABLET | Refills: 3 | Status: SHIPPED | OUTPATIENT
Start: 2018-05-29 | End: 2018-11-29 | Stop reason: SDUPTHER

## 2018-05-30 ENCOUNTER — TELEPHONE (OUTPATIENT)
Dept: FAMILY MEDICINE CLINIC | Facility: CLINIC | Age: 38
End: 2018-05-30

## 2018-05-30 DIAGNOSIS — M25.50 POLYARTHRALGIA: Primary | ICD-10-CM

## 2018-05-30 DIAGNOSIS — R53.82 CHRONIC FATIGUE: ICD-10-CM

## 2018-05-30 NOTE — TELEPHONE ENCOUNTER
----- Message from Tami Gonzalez sent at 5/30/2018 10:36 AM EDT -----  Contact: JUAN  PATIENT WANTS TO KNOW IF YOU WILL GIVE HER A REFERRAL TO A RHEUMATOLOGIST. HER PAIN MANAGEMENT DR IS REQUESTING THIS.  [phone]

## 2018-06-07 ENCOUNTER — OFFICE VISIT (OUTPATIENT)
Dept: FAMILY MEDICINE CLINIC | Facility: CLINIC | Age: 38
End: 2018-06-07

## 2018-06-07 VITALS
TEMPERATURE: 97.9 F | WEIGHT: 192 LBS | HEIGHT: 66 IN | HEART RATE: 92 BPM | BODY MASS INDEX: 30.86 KG/M2 | SYSTOLIC BLOOD PRESSURE: 130 MMHG | RESPIRATION RATE: 16 BRPM | DIASTOLIC BLOOD PRESSURE: 80 MMHG

## 2018-06-07 DIAGNOSIS — M25.511 PAIN IN JOINT OF RIGHT SHOULDER: ICD-10-CM

## 2018-06-07 DIAGNOSIS — M35.3 POLYMYALGIA (HCC): Primary | ICD-10-CM

## 2018-06-07 PROCEDURE — 99213 OFFICE O/P EST LOW 20 MIN: CPT | Performed by: NURSE PRACTITIONER

## 2018-06-07 RX ORDER — PREDNISONE 10 MG/1
10 TABLET ORAL 2 TIMES DAILY
Qty: 6 TABLET | Refills: 0 | Status: SHIPPED | OUTPATIENT
Start: 2018-06-07 | End: 2018-08-23

## 2018-06-07 NOTE — PROGRESS NOTES
Subjective   Bladimir Bah is a 38 y.o. female.     History of Present Illness   Joint pain in right shoulder knee and wrist on right side  Trouble walking and using RUE  Nothing has helped in the past when she has had a flare  She has an appt with Rheumatologist next week on 6/14.  Ibuprofen, Tylenol, Percocet does not help with pain    The following portions of the patient's history were reviewed and updated as appropriate: allergies, current medications, past family history, past medical history, past social history, past surgical history and problem list.    Review of Systems   Constitutional: Positive for activity change.   Musculoskeletal: Positive for arthralgias and gait problem.   Psychiatric/Behavioral: Positive for sleep disturbance and depressed mood.       Objective   Physical Exam   Constitutional: She appears well-developed and well-nourished.   Cardiovascular: Normal rate, regular rhythm and normal heart sounds.    Psychiatric: Her speech is normal and behavior is normal. Thought content normal. Angry: tearful. Cognition and memory are normal. She exhibits a depressed mood.   Nursing note and vitals reviewed.        Assessment/Plan   Bladimir was seen today for joint pain and vitamin d deficiency.    Diagnoses and all orders for this visit:    Polymyalgia    Pain in joint of right shoulder    Other orders  -     predniSONE (DELTASONE) 10 MG tablet; Take 1 tablet by mouth 2 (Two) Times a Day.        Pt encouraged to keep appt with Rheumatology

## 2018-06-19 DIAGNOSIS — K21.9 GASTROESOPHAGEAL REFLUX DISEASE, ESOPHAGITIS PRESENCE NOT SPECIFIED: ICD-10-CM

## 2018-06-19 RX ORDER — RANITIDINE 150 MG/1
150 TABLET ORAL NIGHTLY
Qty: 30 TABLET | Refills: 5 | Status: SHIPPED | OUTPATIENT
Start: 2018-06-19 | End: 2018-11-29 | Stop reason: SDUPTHER

## 2018-08-23 ENCOUNTER — HOSPITAL ENCOUNTER (OUTPATIENT)
Dept: GENERAL RADIOLOGY | Facility: HOSPITAL | Age: 38
Discharge: HOME | End: 2018-08-23
Admitting: PHYSICIAN ASSISTANT

## 2018-08-23 ENCOUNTER — OFFICE VISIT (OUTPATIENT)
Dept: FAMILY MEDICINE CLINIC | Facility: CLINIC | Age: 38
End: 2018-08-23

## 2018-08-23 VITALS
RESPIRATION RATE: 16 BRPM | TEMPERATURE: 97.8 F | DIASTOLIC BLOOD PRESSURE: 80 MMHG | SYSTOLIC BLOOD PRESSURE: 118 MMHG | HEART RATE: 92 BPM | BODY MASS INDEX: 31.66 KG/M2 | HEIGHT: 66 IN | WEIGHT: 197 LBS

## 2018-08-23 DIAGNOSIS — R21 SKIN RASH: ICD-10-CM

## 2018-08-23 DIAGNOSIS — H60.332 ACUTE SWIMMER'S EAR OF LEFT SIDE: Primary | ICD-10-CM

## 2018-08-23 DIAGNOSIS — M25.552 LEFT HIP PAIN: ICD-10-CM

## 2018-08-23 PROCEDURE — 96372 THER/PROPH/DIAG INJ SC/IM: CPT | Performed by: PHYSICIAN ASSISTANT

## 2018-08-23 PROCEDURE — 73552 X-RAY EXAM OF FEMUR 2/>: CPT

## 2018-08-23 PROCEDURE — 73502 X-RAY EXAM HIP UNI 2-3 VIEWS: CPT

## 2018-08-23 PROCEDURE — 99214 OFFICE O/P EST MOD 30 MIN: CPT | Performed by: PHYSICIAN ASSISTANT

## 2018-08-23 RX ORDER — CEFDINIR 300 MG/1
CAPSULE ORAL
COMMUNITY
Start: 2018-08-23 | End: 2018-08-28

## 2018-08-23 RX ORDER — FLUCONAZOLE 150 MG/1
TABLET ORAL
COMMUNITY
Start: 2018-08-23 | End: 2018-11-29

## 2018-08-23 RX ORDER — TRIAMCINOLONE ACETONIDE 5 MG/G
OINTMENT TOPICAL 3 TIMES DAILY
Qty: 15 G | Refills: 0 | Status: SHIPPED | OUTPATIENT
Start: 2018-08-23 | End: 2018-11-29

## 2018-08-23 RX ORDER — PREDNISONE 20 MG/1
20 TABLET ORAL 2 TIMES DAILY
Qty: 10 TABLET | Refills: 0 | Status: SHIPPED | OUTPATIENT
Start: 2018-08-23 | End: 2018-11-29

## 2018-08-23 RX ORDER — KETOROLAC TROMETHAMINE 30 MG/ML
60 INJECTION, SOLUTION INTRAMUSCULAR; INTRAVENOUS ONCE
Status: COMPLETED | OUTPATIENT
Start: 2018-08-23 | End: 2018-08-23

## 2018-08-23 RX ORDER — OFLOXACIN 3 MG/ML
SOLUTION/ DROPS OPHTHALMIC
COMMUNITY
Start: 2018-08-23 | End: 2018-08-28

## 2018-08-23 RX ADMIN — KETOROLAC TROMETHAMINE 60 MG: 30 INJECTION, SOLUTION INTRAMUSCULAR; INTRAVENOUS at 12:55

## 2018-08-23 NOTE — PROGRESS NOTES
Subjective   Bladimir Bah is a 38 y.o. female.     History of Present Illness   Pt presents with CC of earache. Treated last night at [facility] for swimmer's ear. Drops were sent to pharmacy. She has not picked up yet   Scattered itchy lesions on arms and abdomen bilaterally. No skin lesions on fingers, wrists, hands or feet. Spots come and go.   Woke up this morning with L hip pain. No injury or fall. Cant put any weight on it. Hx of hip replacement on that side with hardware. Pt ambulating with cane. No active ortho (previous stopped accepting her insurance) pt sees pain management.     The following portions of the patient's history were reviewed and updated as appropriate: allergies, current medications, past family history, past medical history, past social history, past surgical history and problem list.    Review of Systems   Constitutional: Positive for fatigue. Negative for chills, diaphoresis and fever.   HENT: Positive for ear discharge and ear pain. Negative for congestion, hearing loss, nosebleeds, postnasal drip, sinus pressure, sneezing and sore throat.    Eyes: Negative.    Respiratory: Negative.  Negative for cough, chest tightness, shortness of breath and wheezing.    Cardiovascular: Negative.  Negative for chest pain, palpitations and leg swelling.   Gastrointestinal: Negative for abdominal distention, abdominal pain, anal bleeding, blood in stool, constipation, diarrhea, nausea, rectal pain and vomiting.   Endocrine: Negative.  Negative for cold intolerance, heat intolerance, polydipsia, polyphagia and polyuria.   Genitourinary: Negative.  Negative for difficulty urinating, dysuria, flank pain, frequency, hematuria and urgency.   Musculoskeletal: Positive for arthralgias and gait problem. Negative for back pain, joint swelling, myalgias, neck pain and neck stiffness.   Skin: Positive for rash. Negative for color change, pallor and wound.   Allergic/Immunologic: Negative.  Negative for  immunocompromised state.   Neurological: Negative for dizziness, syncope, weakness, light-headedness, numbness and headaches.       Objective   Physical Exam   Constitutional: She is oriented to person, place, and time. She appears well-developed and well-nourished.   HENT:   Head: Normocephalic and atraumatic.   Right Ear: Tympanic membrane, external ear and ear canal normal.   Left Ear: External ear normal. There is drainage, swelling and tenderness.   Nose: Nose normal.   Mouth/Throat: Oropharynx is clear and moist. No oropharyngeal exudate.   Eyes: Pupils are equal, round, and reactive to light. Conjunctivae and EOM are normal.   Neck: Normal range of motion. Neck supple. No tracheal deviation present. No thyromegaly present.   Cardiovascular: Normal rate, regular rhythm, normal heart sounds and intact distal pulses.  Exam reveals no gallop and no friction rub.    No murmur heard.  Pulmonary/Chest: Effort normal and breath sounds normal. No respiratory distress. She has no wheezes. She has no rales. She exhibits no tenderness.   Abdominal: Soft. Bowel sounds are normal. She exhibits no distension and no mass. There is no tenderness. There is no rebound and no guarding. No hernia.   Musculoskeletal: She exhibits no edema or deformity.        Left hip: She exhibits decreased range of motion, decreased strength, tenderness and bony tenderness.        Left upper leg: She exhibits tenderness.   Lymphadenopathy:     She has no cervical adenopathy.   Neurological: She is alert and oriented to person, place, and time. She has normal reflexes.   Skin: Skin is warm and dry.   Scattered papules on elbows and lower arms bilaterally    Psychiatric: She has a normal mood and affect. Her behavior is normal. Judgment and thought content normal.   Nursing note and vitals reviewed.      Assessment/Plan   Bladimir was seen today for l side earache, lle pain and itching rash.    Diagnoses and all orders for this visit:    Acute  swimmer's ear of left side  -     predniSONE (DELTASONE) 20 MG tablet; Take 1 tablet by mouth 2 (Two) Times a Day.    Left hip pain  -     XR Hip With or Without Pelvis 2 - 3 View Left  -     XR femur 2 vw left  -     ketorolac (TORADOL) injection 60 mg; Inject 60 mg into the appropriate muscle as directed by prescriber 1 (One) Time.    Skin rash  -     triamcinolone (KENALOG) 0.5 % ointment; Apply  topically to the appropriate area as directed 3 (Three) Times a Day.  -     predniSONE (DELTASONE) 20 MG tablet; Take 1 tablet by mouth 2 (Two) Times a Day.    toradol injection administered. Will get XR of hip and femur of affected side. F/U with ortho and pain management   Kenalog ointment for rash

## 2018-08-28 ENCOUNTER — TELEPHONE (OUTPATIENT)
Dept: FAMILY MEDICINE CLINIC | Facility: CLINIC | Age: 38
End: 2018-08-28

## 2018-08-28 RX ORDER — AMOXICILLIN 500 MG/1
1000 CAPSULE ORAL 2 TIMES DAILY
Qty: 40 CAPSULE | Refills: 0 | Status: SHIPPED | OUTPATIENT
Start: 2018-08-28 | End: 2018-11-29

## 2018-08-28 RX ORDER — CIPROFLOXACIN AND DEXAMETHASONE 3; 1 MG/ML; MG/ML
4 SUSPENSION/ DROPS AURICULAR (OTIC) 2 TIMES DAILY
Qty: 7.5 ML | Refills: 0 | Status: SHIPPED | OUTPATIENT
Start: 2018-08-28 | End: 2018-11-29

## 2018-08-31 ENCOUNTER — TELEPHONE (OUTPATIENT)
Dept: FAMILY MEDICINE CLINIC | Facility: CLINIC | Age: 38
End: 2018-08-31

## 2018-11-29 ENCOUNTER — OFFICE VISIT (OUTPATIENT)
Dept: FAMILY MEDICINE CLINIC | Facility: CLINIC | Age: 38
End: 2018-11-29

## 2018-11-29 VITALS
DIASTOLIC BLOOD PRESSURE: 78 MMHG | OXYGEN SATURATION: 98 % | WEIGHT: 203 LBS | TEMPERATURE: 96.9 F | HEART RATE: 77 BPM | RESPIRATION RATE: 20 BRPM | BODY MASS INDEX: 32.62 KG/M2 | SYSTOLIC BLOOD PRESSURE: 122 MMHG | HEIGHT: 66 IN

## 2018-11-29 DIAGNOSIS — I10 ESSENTIAL HYPERTENSION: ICD-10-CM

## 2018-11-29 DIAGNOSIS — K58.0 IRRITABLE BOWEL SYNDROME WITH DIARRHEA: ICD-10-CM

## 2018-11-29 DIAGNOSIS — Z72.0 TOBACCO USE: ICD-10-CM

## 2018-11-29 DIAGNOSIS — K21.9 GASTROESOPHAGEAL REFLUX DISEASE, ESOPHAGITIS PRESENCE NOT SPECIFIED: ICD-10-CM

## 2018-11-29 DIAGNOSIS — Z13.220 ENCOUNTER FOR LIPID SCREENING FOR CARDIOVASCULAR DISEASE: ICD-10-CM

## 2018-11-29 DIAGNOSIS — Z51.81 MEDICATION MONITORING ENCOUNTER: ICD-10-CM

## 2018-11-29 DIAGNOSIS — Z13.6 ENCOUNTER FOR LIPID SCREENING FOR CARDIOVASCULAR DISEASE: ICD-10-CM

## 2018-11-29 DIAGNOSIS — E55.9 VITAMIN D DEFICIENCY: ICD-10-CM

## 2018-11-29 DIAGNOSIS — Z91.09 ENVIRONMENTAL ALLERGIES: ICD-10-CM

## 2018-11-29 DIAGNOSIS — Z00.00 ANNUAL VISIT FOR GENERAL ADULT MEDICAL EXAMINATION WITHOUT ABNORMAL FINDINGS: Primary | ICD-10-CM

## 2018-11-29 DIAGNOSIS — F41.8 DEPRESSION WITH ANXIETY: ICD-10-CM

## 2018-11-29 PROBLEM — I95.9 HYPOTENSION: Status: RESOLVED | Noted: 2017-03-21 | Resolved: 2018-11-29

## 2018-11-29 PROBLEM — L02.12 BOIL OF NECK: Status: RESOLVED | Noted: 2017-03-21 | Resolved: 2018-11-29

## 2018-11-29 PROBLEM — M25.50 ARTHRALGIA: Status: RESOLVED | Noted: 2017-03-21 | Resolved: 2018-11-29

## 2018-11-29 PROBLEM — H92.02 LEFT EAR PAIN: Status: RESOLVED | Noted: 2017-03-21 | Resolved: 2018-11-29

## 2018-11-29 LAB
25(OH)D3+25(OH)D2 SERPL-MCNC: 23.9 NG/ML
ALBUMIN SERPL-MCNC: 4.24 G/DL (ref 3.2–4.8)
ALBUMIN/GLOB SERPL: 2.2 G/DL (ref 1.5–2.5)
ALP SERPL-CCNC: 124 U/L (ref 25–100)
ALT SERPL-CCNC: 27 U/L (ref 7–40)
AST SERPL-CCNC: 21 U/L (ref 0–33)
BASOPHILS # BLD AUTO: 0.03 10*3/MM3 (ref 0–0.2)
BASOPHILS NFR BLD AUTO: 0.4 % (ref 0–1)
BILIRUB SERPL-MCNC: 0.3 MG/DL (ref 0.3–1.2)
BUN SERPL-MCNC: 11 MG/DL (ref 9–23)
BUN/CREAT SERPL: 13.3 (ref 7–25)
CALCIUM SERPL-MCNC: 9.3 MG/DL (ref 8.7–10.4)
CHLORIDE SERPL-SCNC: 108 MMOL/L (ref 99–109)
CHOLEST SERPL-MCNC: 183 MG/DL (ref 0–200)
CO2 SERPL-SCNC: 32 MMOL/L (ref 20–31)
CREAT SERPL-MCNC: 0.83 MG/DL (ref 0.6–1.3)
EOSINOPHIL # BLD AUTO: 0.12 10*3/MM3 (ref 0–0.3)
EOSINOPHIL NFR BLD AUTO: 1.7 % (ref 0–3)
ERYTHROCYTE [DISTWIDTH] IN BLOOD BY AUTOMATED COUNT: 14.3 % (ref 11.3–14.5)
GLOBULIN SER CALC-MCNC: 2 GM/DL
GLUCOSE SERPL-MCNC: 89 MG/DL (ref 70–100)
HCT VFR BLD AUTO: 40.5 % (ref 34.5–44)
HDLC SERPL-MCNC: 37 MG/DL (ref 40–60)
HGB BLD-MCNC: 13 G/DL (ref 11.5–15.5)
IMM GRANULOCYTES # BLD: 0.01 10*3/MM3 (ref 0–0.03)
IMM GRANULOCYTES NFR BLD: 0.1 % (ref 0–0.6)
LDLC SERPL CALC-MCNC: 121 MG/DL (ref 0–100)
LYMPHOCYTES # BLD AUTO: 3.44 10*3/MM3 (ref 0.6–4.8)
LYMPHOCYTES NFR BLD AUTO: 48.8 % (ref 24–44)
MAGNESIUM SERPL-MCNC: 1.8 MG/DL (ref 1.3–2.7)
MCH RBC QN AUTO: 26.9 PG (ref 27–31)
MCHC RBC AUTO-ENTMCNC: 32.1 G/DL (ref 32–36)
MCV RBC AUTO: 83.9 FL (ref 80–99)
MONOCYTES # BLD AUTO: 0.61 10*3/MM3 (ref 0–1)
MONOCYTES NFR BLD AUTO: 8.7 % (ref 0–12)
NEUTROPHILS # BLD AUTO: 2.84 10*3/MM3 (ref 1.5–8.3)
NEUTROPHILS NFR BLD AUTO: 40.3 % (ref 41–71)
PLATELET # BLD AUTO: 307 10*3/MM3 (ref 150–450)
POTASSIUM SERPL-SCNC: 4.5 MMOL/L (ref 3.5–5.5)
PROT SERPL-MCNC: 6.2 G/DL (ref 5.7–8.2)
RBC # BLD AUTO: 4.83 10*6/MM3 (ref 3.89–5.14)
SODIUM SERPL-SCNC: 142 MMOL/L (ref 132–146)
T4 FREE SERPL-MCNC: 1.02 NG/DL (ref 0.89–1.76)
TRIGL SERPL-MCNC: 124 MG/DL (ref 0–150)
TSH SERPL DL<=0.005 MIU/L-ACNC: 2.52 MIU/ML (ref 0.35–5.35)
VLDLC SERPL CALC-MCNC: 24.8 MG/DL
WBC # BLD AUTO: 7.05 10*3/MM3 (ref 3.5–10.8)

## 2018-11-29 PROCEDURE — 99406 BEHAV CHNG SMOKING 3-10 MIN: CPT | Performed by: PHYSICIAN ASSISTANT

## 2018-11-29 PROCEDURE — 99395 PREV VISIT EST AGE 18-39: CPT | Performed by: PHYSICIAN ASSISTANT

## 2018-11-29 RX ORDER — DICYCLOMINE HCL 20 MG
20 TABLET ORAL 4 TIMES DAILY
Qty: 120 TABLET | Refills: 5 | Status: SHIPPED | OUTPATIENT
Start: 2018-11-29 | End: 2022-02-23 | Stop reason: SDUPTHER

## 2018-11-29 RX ORDER — ERGOCALCIFEROL 1.25 MG/1
50000 CAPSULE ORAL
Qty: 4 CAPSULE | Refills: 5 | Status: SHIPPED | OUTPATIENT
Start: 2018-11-29 | End: 2019-11-22 | Stop reason: SDUPTHER

## 2018-11-29 RX ORDER — RANITIDINE 150 MG/1
150 TABLET ORAL NIGHTLY
Qty: 30 TABLET | Refills: 5 | Status: SHIPPED | OUTPATIENT
Start: 2018-11-29 | End: 2020-04-10

## 2018-11-29 RX ORDER — FLUTICASONE PROPIONATE 50 MCG
2 SPRAY, SUSPENSION (ML) NASAL DAILY
Qty: 16 G | Refills: 5 | Status: SHIPPED | OUTPATIENT
Start: 2018-11-29 | End: 2021-12-21

## 2018-11-29 RX ORDER — ONDANSETRON 8 MG/1
8 TABLET, ORALLY DISINTEGRATING ORAL EVERY 8 HOURS PRN
Qty: 20 TABLET | Refills: 5 | Status: SHIPPED | OUTPATIENT
Start: 2018-11-29 | End: 2019-08-19

## 2018-11-29 RX ORDER — LEVOCETIRIZINE DIHYDROCHLORIDE 5 MG/1
5 TABLET, FILM COATED ORAL DAILY
Qty: 30 TABLET | Refills: 3 | Status: SHIPPED | OUTPATIENT
Start: 2018-11-29 | End: 2020-03-30

## 2018-11-29 RX ORDER — OMEPRAZOLE 40 MG/1
40 CAPSULE, DELAYED RELEASE ORAL DAILY
Qty: 30 CAPSULE | Refills: 5 | Status: SHIPPED | OUTPATIENT
Start: 2018-11-29 | End: 2020-04-10 | Stop reason: DRUGHIGH

## 2018-11-29 RX ORDER — HYDROXYZINE 50 MG/1
50 TABLET, FILM COATED ORAL 3 TIMES DAILY
Qty: 90 TABLET | Refills: 5 | Status: SHIPPED | OUTPATIENT
Start: 2018-11-29 | End: 2019-08-19 | Stop reason: SDUPTHER

## 2018-11-29 RX ORDER — HYDROCHLOROTHIAZIDE 25 MG/1
25 TABLET ORAL DAILY
Qty: 30 TABLET | Refills: 5 | Status: SHIPPED | OUTPATIENT
Start: 2018-11-29 | End: 2020-09-21

## 2018-11-29 NOTE — PROGRESS NOTES
Chief Complaint   Patient presents with   • Annual Exam     Physical        Subjective   The patient is a 38 y.o. female who presents for annual wellness exam      Nutrition:  well balanced   Exercise: none, tries to walk as much as possible   Sleep: havent been sleeping. Been very depressed and anxious      Past Gynecological History:     No LMP recorded.     Contraception: hx of hysterectomy      Pap History:was done on approximately 1 year ago and the result was: normal PAP. states she still gets routine paps despite hysterectomy       Past Medical History,Medications, Allergies reviewed.     Other concerns/problems: anxiety and depression   HPI  Does not feel like cymbalta is helping with anxiety and depression. Never really noticed a change. Always sad and very anxious. Was given an Rx for clonazepam in March by TEJA Rabago to take as needed. States this really helped.     Due for labs   GERD stable   Needs refill on all medications and anxiety medications  Pt still seeing pain management ant taking percocet     Review of Systems   Constitutional: Positive for fatigue. Negative for chills, diaphoresis and fever.   HENT: Negative.  Negative for congestion, ear discharge, ear pain, hearing loss, nosebleeds, postnasal drip, sinus pressure, sneezing and sore throat.    Eyes: Negative.    Respiratory: Negative.  Negative for cough, chest tightness, shortness of breath and wheezing.    Cardiovascular: Negative.  Negative for chest pain, palpitations and leg swelling.   Gastrointestinal: Negative for abdominal distention, abdominal pain, anal bleeding, blood in stool, constipation, diarrhea, nausea, rectal pain and vomiting.   Endocrine: Negative.  Negative for cold intolerance, heat intolerance, polydipsia, polyphagia and polyuria.   Genitourinary: Negative.  Negative for difficulty urinating, dysuria, flank pain, frequency, hematuria and urgency.   Musculoskeletal: Positive for arthralgias. Negative for back pain,  "gait problem, joint swelling, myalgias, neck pain and neck stiffness.   Skin: Negative.  Negative for color change, pallor, rash and wound.   Allergic/Immunologic: Negative.  Negative for immunocompromised state.   Neurological: Negative for dizziness, syncope, weakness, light-headedness, numbness and headaches.   Hematological: Negative.  Negative for adenopathy. Does not bruise/bleed easily.   Psychiatric/Behavioral: Positive for dysphoric mood and sleep disturbance. Negative for behavioral problems, confusion, self-injury and suicidal ideas. The patient is nervous/anxious.        Objective     /78 (BP Location: Left arm, Patient Position: Sitting, Cuff Size: Adult)   Pulse 77   Temp 96.9 °F (36.1 °C) (Temporal)   Resp 20   Ht 167.6 cm (65.98\")   Wt 92.1 kg (203 lb)   SpO2 98%   BMI 32.78 kg/m²     Physical Exam   Constitutional: She is oriented to person, place, and time. She appears well-developed and well-nourished.   HENT:   Head: Normocephalic and atraumatic.   Right Ear: Tympanic membrane, external ear and ear canal normal.   Left Ear: Tympanic membrane, external ear and ear canal normal.   Nose: Nose normal.   Mouth/Throat: Oropharynx is clear and moist. No oropharyngeal exudate.   Eyes: Conjunctivae and EOM are normal. Pupils are equal, round, and reactive to light.   Neck: Normal range of motion. Neck supple. No tracheal deviation present. No thyromegaly present.   Cardiovascular: Normal rate, regular rhythm, normal heart sounds and intact distal pulses.   Pulmonary/Chest: Effort normal and breath sounds normal. No respiratory distress. She has no wheezes. She has no rales. She exhibits no tenderness.   Abdominal: Soft. Bowel sounds are normal. She exhibits no distension and no mass. There is no tenderness. There is no rebound and no guarding. No hernia.   Lymphadenopathy:     She has no cervical adenopathy.   Neurological: She is alert and oriented to person, place, and time. She has normal " reflexes.   Skin: Skin is warm and dry.   Psychiatric: She has a normal mood and affect. Her behavior is normal. Judgment and thought content normal.   Nursing note and vitals reviewed.      Assessment/Plan     1. Annual visit for general adult medical examination without abnormal findings    2. Environmental allergies  - levocetirizine (XYZAL) 5 MG tablet; Take 1 tablet by mouth Daily.  Dispense: 30 tablet; Refill: 3  - hydrOXYzine (ATARAX) 50 MG tablet; Take 1 tablet by mouth 3 (Three) Times a Day.  Dispense: 90 tablet; Refill: 5  - fluticasone (FLONASE) 50 MCG/ACT nasal spray; 2 sprays into the nostril(s) as directed by provider Daily.  Dispense: 16 g; Refill: 5    3. Gastroesophageal reflux disease, esophagitis presence not specified  - raNITIdine (ZANTAC) 150 MG tablet; Take 1 tablet by mouth Every Night.  Dispense: 30 tablet; Refill: 5  - omeprazole (priLOSEC) 40 MG capsule; Take 1 capsule by mouth Daily.  Dispense: 30 capsule; Refill: 5    4. Vitamin D deficiency  - Vitamin D 25 Hydroxy  - vitamin D (ERGOCALCIFEROL) 22989 units capsule capsule; Take 1 capsule by mouth Every 7 (Seven) Days.  Dispense: 4 capsule; Refill: 5    5. Tobacco use    6. Depression with anxiety  - TSH  - T4, free  - Vilazodone HCl (VIIBRYD STARTER PACK) 10 & 20 MG kit; Take 10 mg by mouth Daily for 7 days, THEN 20 mg Daily for 7 days.  Dispense: 6 kit; Refill: 0    7. Irritable bowel syndrome with diarrhea  - ondansetron ODT (ZOFRAN-ODT) 8 MG disintegrating tablet; Take 1 tablet by mouth Every 8 (Eight) Hours As Needed for Nausea or Vomiting.  Dispense: 20 tablet; Refill: 5  - dicyclomine (BENTYL) 20 MG tablet; Take 1 tablet by mouth 4 (Four) Times a Day.  Dispense: 120 tablet; Refill: 5    8. BMI 32.0-32.9,adult    9. Medication monitoring encounter  - CBC & Differential  - Comprehensive Metabolic Panel  - Magnesium    10. Encounter for lipid screening for cardiovascular disease  - Lipid Panel    11. Essential hypertension  -  hydrochlorothiazide (HYDRODIURIL) 25 MG tablet; Take 1 tablet by mouth Daily.  Dispense: 30 tablet; Refill: 5    Labs and refills as outlined in plan. Has not noticed really any change in symptoms since starting cymbalta will try and bridge her to Viibryd as directed. If significant withdrawal side effects develop may need to taper dose of Cymbalta. Pt agrees. Informed patient that I can not proscribe clonazepam, she will need to f/u with TEJA Rabago to consider refills.     I advised Bladimir of the risks of continuing to use tobacco, and I provided her with tobacco cessation educational materials in the After Visit Summary.     During this visit, I spent 5 minutes counseling the patient regarding tobacco cessation.    Counseling was given to patient for the following topics: risk factor reductions, patient and family education, risks and benefits of treatment options and importance of treatment compliance . Total time of the encounter was 30 minutes and 15 minutes was spend counseling.    CONY Sanders

## 2018-11-30 ENCOUNTER — TELEPHONE (OUTPATIENT)
Dept: FAMILY MEDICINE CLINIC | Facility: CLINIC | Age: 38
End: 2018-11-30

## 2018-11-30 NOTE — TELEPHONE ENCOUNTER
----- Message from Betty Ruano sent at 11/30/2018 10:17 AM EST -----  Contact: santana; pt call back   Since pt feels like she is running a light fever and diharrea since she received the hep a shot. She wanted to know if this was side effects of the shot.     [phone]

## 2018-11-30 NOTE — TELEPHONE ENCOUNTER
Usually injection site tenderness and HA, fever are the most common side effect to Hep A vaccination. I do not see in the literature about diarrhea. She might ask her pharmacist to look it up for her. nara

## 2018-12-14 ENCOUNTER — OFFICE VISIT (OUTPATIENT)
Dept: FAMILY MEDICINE CLINIC | Facility: CLINIC | Age: 38
End: 2018-12-14

## 2018-12-14 VITALS
TEMPERATURE: 97.9 F | DIASTOLIC BLOOD PRESSURE: 80 MMHG | SYSTOLIC BLOOD PRESSURE: 130 MMHG | WEIGHT: 198 LBS | HEART RATE: 88 BPM | RESPIRATION RATE: 16 BRPM | BODY MASS INDEX: 31.82 KG/M2 | HEIGHT: 66 IN

## 2018-12-14 DIAGNOSIS — R19.7 DIARRHEA OF PRESUMED INFECTIOUS ORIGIN: Primary | ICD-10-CM

## 2018-12-14 DIAGNOSIS — F41.9 ANXIETY AND DEPRESSION: ICD-10-CM

## 2018-12-14 DIAGNOSIS — R35.0 URINARY FREQUENCY: ICD-10-CM

## 2018-12-14 DIAGNOSIS — K58.0 IRRITABLE BOWEL SYNDROME WITH DIARRHEA: ICD-10-CM

## 2018-12-14 DIAGNOSIS — F32.A ANXIETY AND DEPRESSION: ICD-10-CM

## 2018-12-14 PROCEDURE — 99214 OFFICE O/P EST MOD 30 MIN: CPT | Performed by: NURSE PRACTITIONER

## 2018-12-14 RX ORDER — DIPHENOXYLATE HYDROCHLORIDE AND ATROPINE SULFATE 2.5; .025 MG/1; MG/1
1-2 TABLET ORAL 4 TIMES DAILY PRN
Qty: 24 TABLET | Refills: 0 | Status: SHIPPED | OUTPATIENT
Start: 2018-12-14 | End: 2019-05-31

## 2018-12-14 RX ORDER — BUSPIRONE HYDROCHLORIDE 7.5 MG/1
7.5 TABLET ORAL 3 TIMES DAILY
Qty: 90 TABLET | Refills: 1 | Status: SHIPPED | OUTPATIENT
Start: 2018-12-14 | End: 2019-08-19

## 2018-12-14 NOTE — PATIENT INSTRUCTIONS
Chronic Diarrhea  Diarrhea is a condition in which a person passes frequent loose and watery stools. It can cause you to feel weak and dehydrated. Dehydration can make you tired and thirsty. It can also cause a dry mouth, decreased urination, and dark yellow urine. Diarrhea is a sign of another underlying problem, such as:  · Infection.  · Medication side effects.  · Dietary intolerance, such as lactose intolerance.  · Conditions such as celiac disease, irritable bowel syndrome (IBS), or inflammatory bowel disease (IBD).    In most cases, diarrhea lasts 2-3 days. Diarrhea that lasts longer than 4 weeks is called long-lasting (chronic) diarrhea. It is important that you treat your diarrhea as told by your health care provider.  Follow these instructions at home:  Follow these recommendations as told by your health care provider.  Eating and drinking  · Take an oral rehydration solution (ORS). This is a drink that is designed to keep you hydrated. It can be found at pharmacies and retail stores.  · Drink clear fluids, such as water, ice chips, diluted fruit juice, and low-calorie sports drinks.  · Follow the diet recommended by your health care provider. You may need to avoid foods that trigger diarrhea for you.  · Avoid foods and beverages that contain a lot of sugar or caffeine.  · Avoid alcohol.  · Avoid spicy or fatty foods.  General instructions  · Drink enough fluid to keep your urine clear or pale yellow.  · Wash your hands often and after each diarrhea episode. If soap and water are not available, use hand .  · Make sure that all people in your household wash their hands well and often.  · Take over-the-counter and prescription medicines only as told by your health care provider.  · If you were prescribed an antibiotic medicine, take it as told by your health care provider. Do not stop taking the antibiotic even if you start to feel better.  · Rest at home while you recover.  · Watch your condition  for any changes.  · Take a warm bath to relieve any burning or pain from frequent diarrhea episodes.  · Keep all follow-up visits as told by your health care provider. This is important.  Contact a health care provider if:  · You have a fever.  · Your diarrhea gets worse or does not get better.  · You have new symptoms.  · You cannot drink fluids without vomiting.  · You feel light-headed or dizzy.  · You have a headache.  · You have muscle cramps.  · You have severe pain in the rectum.  Get help right away if:  · You have persistent vomiting.  · You have chest pain.  · You feel extremely weak or you faint.  · You have bloody or black stools, or stools that look like tar.  · You have severe pain, cramping, or bloating in your abdomen, or pain that stays in one place.  · You have trouble breathing or you are breathing very quickly.  · Your heart is beating very quickly.  · Your skin feels cold and clammy.  · You feel confused.  · You have a severe headache.  · You have signs of dehydration, such as:  ? Dark urine, very little urine, or no urine.  ? Cracked lips.  ? Dry mouth.  ? Sunken eyes.  ? Sleepiness.  ? Weakness.  Summary  · Chronic diarrhea is a condition in which a person passes frequent loose and watery stools for more than 4 weeks.  · Diarrhea is a sign of another underlying problem.  · Drink enough fluid to keep your urine clear or pale yellow to avoid dehydration.  · Wash your hands often and after each diarrhea episode. If soap and water are not available, use hand .  · It is important that you treat your diarrhea as told by your health care provider.  This information is not intended to replace advice given to you by your health care provider. Make sure you discuss any questions you have with your health care provider.  Document Released: 03/09/2005 Document Revised: 11/06/2017 Document Reviewed: 11/06/2017  [employer] Interactive Patient Education © 2017 [employer]  Diet for Irritable Bowel  Syndrome  When you have irritable bowel syndrome (IBS), the foods you eat and your eating habits are very important. IBS may cause various symptoms, such as abdominal pain, constipation, or diarrhea. Choosing the right foods can help ease discomfort caused by these symptoms. Work with your health care provider and dietitian to find the best eating plan to help control your symptoms.  What general guidelines do I need to follow?  · Keep a food diary. This will help you identify foods that cause symptoms. Write down:  ? What you eat and when.  ? What symptoms you have.  ? When symptoms occur in relation to your meals.  · Avoid foods that cause symptoms. Talk with your dietitian about other ways to get the same nutrients that are in these foods.  · Eat more foods that contain fiber. Take a fiber supplement if directed by your dietitian.  · Eat your meals slowly, in a relaxed setting.  · Aim to eat 5-6 small meals per day. Do not skip meals.  · Drink enough fluids to keep your urine clear or pale yellow.  · Ask your health care provider if you should take an over-the-counter probiotic during flare-ups to help restore healthy gut bacteria.  · If you have cramping or diarrhea, try making your meals low in fat and high in carbohydrates. Examples of carbohydrates are pasta, rice, whole grain breads and cereals, fruits, and vegetables.  · If dairy products cause your symptoms to flare up, try eating less of them. You might be able to handle yogurt better than other dairy products because it contains bacteria that help with digestion.  What foods are not recommended?  The following are some foods and drinks that may worsen your symptoms:  · Fatty foods, such as French fries.  · Milk products, such as cheese or ice cream.  · Chocolate.  · Alcohol.  · Products with caffeine, such as coffee.  · Carbonated drinks, such as soda.    The items listed above may not be a complete list of foods and beverages to avoid. Contact your  dietitian for more information.  What foods are good sources of fiber?  Your health care provider or dietitian may recommend that you eat more foods that contain fiber. Fiber can help reduce constipation and other IBS symptoms. Add foods with fiber to your diet a little at a time so that your body can get used to them. Too much fiber at once might cause gas and swelling of your abdomen. The following are some foods that are good sources of fiber:  · Apples.  · Peaches.  · Pears.  · Berries.  · Figs.  · Broccoli (raw).  · Cabbage.  · Carrots.  · Raw peas.  · Kidney beans.  · Lima beans.  · Whole grain bread.  · Whole grain cereal.    Where to find more information:  International Foundation for Functional Gastrointestinal Disorders: www.iffgd.org  National [place] of Diabetes and Digestive and Kidney Diseases: www.niddk.nih.gov/health-information/health-topics/digestive-diseases/ibs/Pages/facts.aspx  This information is not intended to replace advice given to you by your health care provider. Make sure you discuss any questions you have with your health care provider.  Document Released: 03/09/2005 Document Revised: 05/25/2017 Document Reviewed: 03/20/2015  Elsevier Interactive Patient Education © 2018 [employer].

## 2018-12-14 NOTE — PROGRESS NOTES
Subjective   Bladimir Bah is a 38 y.o. female.     History of Present Illness   Diarrhea and abdominal bloating and cramping since Thanksgiving  Dicyclomine helped at first with abdominal discomfort. Having more anxiety lately with different jobs. She has tried to get disability 4 times and keeps getting denied.   Says she has lost several jobs due to her health issues. Savage tried changing her depression medication to Viibryd but it seemed to make the diarrhea worse.   She says she has been taking Imodium OTC but it is not helping with diarrhea. She has been having so many watery foul smelling BMs she was unable to start her new job yesterday. She denies fever or chills or change in appetite, no blood in stools. She has a history of IBS.   She cannot remember if she has done stool studies in the past, but is willing to do them again  She is willing to try something different for anxiety but does not remember if she has taken Buspar in the past. Pt says she has taken Klonopin in the past and it really helped. Does not want to go back on Duloxetine.    The following portions of the patient's history were reviewed and updated as appropriate: allergies, current medications, past family history, past medical history, past social history, past surgical history and problem list.    Review of Systems   Constitutional: Positive for appetite change and fatigue. Negative for chills and fever.   HENT: Negative.    Respiratory: Negative.    Cardiovascular: Negative.    Gastrointestinal: Positive for abdominal distention and diarrhea. Negative for blood in stool, constipation, nausea and vomiting.   Genitourinary: Positive for frequency. Negative for dysuria.   Musculoskeletal: Negative.    Skin: Negative.    Allergic/Immunologic: Negative.    Neurological: Negative.    Hematological: Negative.    Psychiatric/Behavioral: Positive for sleep disturbance and depressed mood. The patient is nervous/anxious.        Objective    Physical Exam   Constitutional: She is oriented to person, place, and time.   Cardiovascular: Normal rate, regular rhythm and normal heart sounds.   Pulmonary/Chest: Effort normal and breath sounds normal.   Abdominal: Soft. Normal appearance and bowel sounds are normal. She exhibits distension. She exhibits no mass. There is no hepatosplenomegaly. There is generalized tenderness (mild). There is no rigidity, no rebound, no guarding and no CVA tenderness. No hernia.   Neurological: She is alert and oriented to person, place, and time.   Skin: Skin is warm, dry and intact.   Psychiatric: Her speech is normal and behavior is normal. Judgment and thought content normal. Her mood appears anxious. Cognition and memory are normal. She exhibits a depressed mood.   Nursing note and vitals reviewed.        Assessment/Plan   Bladimir was seen today for diarrhea.    Diagnoses and all orders for this visit:    Diarrhea of presumed infectious origin  -     Gastrointestinal Panel, PCR - Stool, Per Rectum; Future  -     H. Pylori Antigen, Stool - Stool, Per Rectum  -     diphenoxylate-atropine (LOMOTIL) 2.5-0.025 MG per tablet; Take 1-2 tablets by mouth 4 (Four) Times a Day As Needed for Diarrhea.  -     Comprehensive Metabolic Panel  -     Hemoglobin A1c  -     Hepatitis panel, acute    Urinary frequency  -     Hemoglobin A1c    Anxiety and depression  -     busPIRone (BUSPAR) 7.5 MG tablet; Take 1 tablet by mouth 3 (Three) Times a Day.    Irritable bowel syndrome with diarrhea      Will have pt check stool studies and for H pylori, acute hepatitis panel, CMP. Will notify pt of results.  Will have pt try Lomotil for diarrhea. Continue bentyl as needed  Will start pt on Buspar for anxiety.

## 2018-12-15 LAB
ALBUMIN SERPL-MCNC: 4.26 G/DL (ref 3.2–4.8)
ALBUMIN/GLOB SERPL: 2 G/DL (ref 1.5–2.5)
ALP SERPL-CCNC: 131 U/L (ref 25–100)
ALT SERPL-CCNC: 23 U/L (ref 7–40)
AST SERPL-CCNC: 16 U/L (ref 0–33)
BILIRUB SERPL-MCNC: 0.3 MG/DL (ref 0.3–1.2)
BUN SERPL-MCNC: 8 MG/DL (ref 9–23)
BUN/CREAT SERPL: 9.5 (ref 7–25)
CALCIUM SERPL-MCNC: 9.3 MG/DL (ref 8.7–10.4)
CHLORIDE SERPL-SCNC: 106 MMOL/L (ref 99–109)
CO2 SERPL-SCNC: 28 MMOL/L (ref 20–31)
CREAT SERPL-MCNC: 0.84 MG/DL (ref 0.6–1.3)
GLOBULIN SER CALC-MCNC: 2.1 GM/DL
GLUCOSE SERPL-MCNC: 85 MG/DL (ref 70–100)
HAV IGM SERPL QL IA: NEGATIVE
HBA1C MFR BLD: 5.6 % (ref 4.8–5.6)
HBV CORE IGM SERPL QL IA: NEGATIVE
HBV SURFACE AG SERPL QL IA: NEGATIVE
HCV AB S/CO SERPL IA: <0.1 S/CO RATIO (ref 0–0.9)
POTASSIUM SERPL-SCNC: 4 MMOL/L (ref 3.5–5.5)
PROT SERPL-MCNC: 6.4 G/DL (ref 5.7–8.2)
SODIUM SERPL-SCNC: 138 MMOL/L (ref 132–146)

## 2018-12-18 DIAGNOSIS — R74.8 ELEVATED SERUM ALKALINE PHOSPHATASE LEVEL: Primary | ICD-10-CM

## 2018-12-23 ENCOUNTER — RESULTS ENCOUNTER (OUTPATIENT)
Dept: FAMILY MEDICINE CLINIC | Facility: CLINIC | Age: 38
End: 2018-12-23

## 2018-12-23 DIAGNOSIS — R74.8 ELEVATED SERUM ALKALINE PHOSPHATASE LEVEL: ICD-10-CM

## 2018-12-27 ENCOUNTER — OFFICE VISIT (OUTPATIENT)
Dept: FAMILY MEDICINE CLINIC | Facility: CLINIC | Age: 38
End: 2018-12-27

## 2018-12-27 VITALS
DIASTOLIC BLOOD PRESSURE: 76 MMHG | WEIGHT: 196 LBS | HEART RATE: 76 BPM | RESPIRATION RATE: 18 BRPM | BODY MASS INDEX: 31.5 KG/M2 | HEIGHT: 66 IN | TEMPERATURE: 97.5 F | SYSTOLIC BLOOD PRESSURE: 118 MMHG

## 2018-12-27 DIAGNOSIS — R10.84 GENERALIZED ABDOMINAL PAIN: Primary | ICD-10-CM

## 2018-12-27 DIAGNOSIS — R19.7 DIARRHEA OF PRESUMED INFECTIOUS ORIGIN: ICD-10-CM

## 2018-12-27 DIAGNOSIS — R19.7 BLOODY DIARRHEA: ICD-10-CM

## 2018-12-27 DIAGNOSIS — B37.9 ANTIBIOTIC-INDUCED YEAST INFECTION: ICD-10-CM

## 2018-12-27 DIAGNOSIS — R14.0 BLOATING: ICD-10-CM

## 2018-12-27 DIAGNOSIS — T36.95XA ANTIBIOTIC-INDUCED YEAST INFECTION: ICD-10-CM

## 2018-12-27 LAB
ALBUMIN SERPL-MCNC: 4.43 G/DL (ref 3.2–4.8)
ALBUMIN/GLOB SERPL: 1.9 G/DL (ref 1.5–2.5)
ALP SERPL-CCNC: 139 U/L (ref 25–100)
ALT SERPL-CCNC: 22 U/L (ref 7–40)
AST SERPL-CCNC: 17 U/L (ref 0–33)
BASOPHILS # BLD AUTO: 0.02 10*3/MM3 (ref 0–0.2)
BASOPHILS NFR BLD AUTO: 0.2 % (ref 0–1)
BILIRUB SERPL-MCNC: 0.4 MG/DL (ref 0.3–1.2)
BUN SERPL-MCNC: 8 MG/DL (ref 9–23)
BUN/CREAT SERPL: 7.6 (ref 7–25)
CALCIUM SERPL-MCNC: 9.5 MG/DL (ref 8.7–10.4)
CHLORIDE SERPL-SCNC: 104 MMOL/L (ref 99–109)
CO2 SERPL-SCNC: 30 MMOL/L (ref 20–31)
CREAT SERPL-MCNC: 1.05 MG/DL (ref 0.6–1.3)
EOSINOPHIL # BLD AUTO: 0.07 10*3/MM3 (ref 0–0.3)
EOSINOPHIL NFR BLD AUTO: 0.8 % (ref 0–3)
ERYTHROCYTE [DISTWIDTH] IN BLOOD BY AUTOMATED COUNT: 13.9 % (ref 11.3–14.5)
GLOBULIN SER CALC-MCNC: 2.4 GM/DL
GLUCOSE SERPL-MCNC: 97 MG/DL (ref 70–100)
HCT VFR BLD AUTO: 42.5 % (ref 34.5–44)
HGB BLD-MCNC: 13.7 G/DL (ref 11.5–15.5)
IMM GRANULOCYTES # BLD: 0.02 10*3/MM3 (ref 0–0.03)
IMM GRANULOCYTES NFR BLD: 0.2 % (ref 0–0.6)
LYMPHOCYTES # BLD AUTO: 4.01 10*3/MM3 (ref 0.6–4.8)
LYMPHOCYTES NFR BLD AUTO: 47.2 % (ref 24–44)
MCH RBC QN AUTO: 26.5 PG (ref 27–31)
MCHC RBC AUTO-ENTMCNC: 32.2 G/DL (ref 32–36)
MCV RBC AUTO: 82.2 FL (ref 80–99)
MONOCYTES # BLD AUTO: 0.6 10*3/MM3 (ref 0–1)
MONOCYTES NFR BLD AUTO: 7.1 % (ref 0–12)
NEUTROPHILS # BLD AUTO: 3.78 10*3/MM3 (ref 1.5–8.3)
NEUTROPHILS NFR BLD AUTO: 44.5 % (ref 41–71)
PLATELET # BLD AUTO: 333 10*3/MM3 (ref 150–450)
POTASSIUM SERPL-SCNC: 4.1 MMOL/L (ref 3.5–5.5)
PROT SERPL-MCNC: 6.8 G/DL (ref 5.7–8.2)
RBC # BLD AUTO: 5.17 10*6/MM3 (ref 3.89–5.14)
SODIUM SERPL-SCNC: 141 MMOL/L (ref 132–146)
WBC # BLD AUTO: 8.5 10*3/MM3 (ref 3.5–10.8)

## 2018-12-27 PROCEDURE — 99214 OFFICE O/P EST MOD 30 MIN: CPT | Performed by: PHYSICIAN ASSISTANT

## 2018-12-27 RX ORDER — FLUCONAZOLE 150 MG/1
150 TABLET ORAL ONCE
Qty: 1 TABLET | Refills: 1 | Status: SHIPPED | OUTPATIENT
Start: 2018-12-27 | End: 2018-12-27

## 2018-12-27 RX ORDER — SIMETHICONE 80 MG
80 TABLET,CHEWABLE ORAL EVERY 6 HOURS PRN
Qty: 60 TABLET | Refills: 0 | Status: SHIPPED | OUTPATIENT
Start: 2018-12-27 | End: 2019-09-12 | Stop reason: SDUPTHER

## 2018-12-27 RX ORDER — CIPROFLOXACIN 500 MG/1
TABLET, FILM COATED ORAL
COMMUNITY
Start: 2018-12-26 | End: 2019-05-31

## 2018-12-27 RX ORDER — METRONIDAZOLE 500 MG/1
TABLET ORAL
COMMUNITY
Start: 2018-12-26 | End: 2019-05-31

## 2018-12-27 NOTE — PROGRESS NOTES
Subjective   Bladimir Bah is a 38 y.o. female.     History of Present Illness   Pt presents for f/u from [facility] ER on 12/25/18 for abdominal pain and blood with bowel movement. Colitis suspected. Pt did not stay to have CT scan (wanted to get home), however was started on cipro and flagyl. Pt just started taking yesterday. Also told her WBC was elevated   Pt was having issues with diarrhea prior to this. See chart note from 12/14. Just brought stool sample for testing   Pain is okay today. No additional bloody bowel movements. Has not had BM today   Would still like to have scan   Feels bloated     The following portions of the patient's history were reviewed and updated as appropriate: allergies, current medications, past family history, past medical history, past social history, past surgical history and problem list.    Review of Systems   Constitutional: Negative.  Negative for chills, diaphoresis, fatigue and fever.   HENT: Negative.  Negative for congestion, ear discharge, ear pain, hearing loss, nosebleeds, postnasal drip, sinus pressure, sneezing and sore throat.    Eyes: Negative.    Respiratory: Negative.  Negative for cough, chest tightness, shortness of breath and wheezing.    Cardiovascular: Negative.  Negative for chest pain, palpitations and leg swelling.   Gastrointestinal: Positive for abdominal distention, abdominal pain and blood in stool. Negative for anal bleeding, constipation, diarrhea, nausea, rectal pain and vomiting.   Endocrine: Negative.  Negative for cold intolerance, heat intolerance, polydipsia, polyphagia and polyuria.   Genitourinary: Negative.  Negative for difficulty urinating, dysuria, flank pain, frequency, hematuria and urgency.   Musculoskeletal: Negative for myalgias and neck pain.   Skin: Negative.  Negative for color change, pallor, rash and wound.   Allergic/Immunologic: Negative.  Negative for immunocompromised state.   Neurological: Negative for dizziness, syncope,  "weakness, light-headedness, numbness and headaches.   Hematological: Negative.  Negative for adenopathy. Does not bruise/bleed easily.       Objective    Blood pressure 118/76, pulse 76, temperature 97.5 °F (36.4 °C), resp. rate 18, height 167.6 cm (66\"), weight 88.9 kg (196 lb).     Physical Exam   Constitutional: She is oriented to person, place, and time. She appears well-developed and well-nourished.   HENT:   Head: Normocephalic and atraumatic.   Right Ear: External ear normal.   Left Ear: External ear normal.   Nose: Nose normal.   Mouth/Throat: Oropharynx is clear and moist. No oropharyngeal exudate.   Eyes: Conjunctivae and EOM are normal. Pupils are equal, round, and reactive to light.   Neck: Normal range of motion. Neck supple. No tracheal deviation present. No thyromegaly present.   Cardiovascular: Normal rate, regular rhythm, normal heart sounds and intact distal pulses. Exam reveals no gallop and no friction rub.   No murmur heard.  Pulmonary/Chest: Effort normal and breath sounds normal. No respiratory distress. She has no wheezes. She has no rales. She exhibits no tenderness.   Abdominal: Soft. Bowel sounds are normal. She exhibits distension. She exhibits no mass. There is generalized tenderness. There is no rebound and no guarding. No hernia.   Lymphadenopathy:     She has no cervical adenopathy.   Neurological: She is alert and oriented to person, place, and time. She has normal reflexes.   Skin: Skin is warm and dry.   Psychiatric: She has a normal mood and affect. Her behavior is normal. Judgment and thought content normal.   Nursing note and vitals reviewed.      Assessment/Plan   Bladimir was seen today for rectal bleeding.    Diagnoses and all orders for this visit:    Generalized abdominal pain  -     CT Abdomen Pelvis With & Without Contrast  -     CBC & Differential  -     Comprehensive Metabolic Panel    Bloody diarrhea  -     CT Abdomen Pelvis With & Without Contrast    Antibiotic-induced " yeast infection  -     fluconazole (DIFLUCAN) 150 MG tablet; Take 1 tablet by mouth 1 (One) Time for 1 dose.    Bloating  -     simethicone (GAS-X) 80 MG chewable tablet; Chew 1 tablet Every 6 (Six) Hours As Needed for Flatulence.  -     CT Abdomen Pelvis With & Without Contrast      Order for CT scan placed. Recheck labs as noted   Report back to ER if new or worsening symptoms develop   May need GI referral in the future. Continue with abx as prescribed by ER   Encourage no anti-diarrheals at this time

## 2018-12-28 ENCOUNTER — TELEPHONE (OUTPATIENT)
Dept: FAMILY MEDICINE CLINIC | Facility: CLINIC | Age: 38
End: 2018-12-28

## 2018-12-30 LAB
ADV 40+41 DNA STL QL NAA+NON-PROBE: NOT DETECTED
ASTRO TYP 1-8 RNA STL QL NAA+NON-PROBE: NOT DETECTED
C CAYETANENSIS DNA STL QL NAA+NON-PROBE: NOT DETECTED
C COLI+JEJ+UPSA DNA STL QL NAA+NON-PROBE: NOT DETECTED
C DIF TOX TCDA+TCDB STL QL NAA+NON-PROBE: NOT DETECTED
CRYPTOSP DNA STL QL NAA+NON-PROBE: NOT DETECTED
E COLI O157 DNA STL QL NAA+NON-PROBE: NORMAL
E HISTOLYT DNA STL QL NAA+NON-PROBE: NOT DETECTED
EAEC PAA PLAS AGGR+AATA ST NAA+NON-PRB: NOT DETECTED
EC STX1+STX2 GENES STL QL NAA+NON-PROBE: NOT DETECTED
EPEC EAE GENE STL QL NAA+NON-PROBE: NOT DETECTED
ETEC LTA+ST1A+ST1B TOX ST NAA+NON-PROBE: NOT DETECTED
G LAMBLIA DNA STL QL NAA+NON-PROBE: NOT DETECTED
H PYLORI AG STL QL IA: NEGATIVE
NOROVIRUS GI+II RNA STL QL NAA+NON-PROBE: NOT DETECTED
ONE SPECIMEN IDENTIFIER: NORMAL
P SHIGELLOIDES DNA STL QL NAA+NON-PROBE: NOT DETECTED
RVA RNA STL QL NAA+NON-PROBE: NOT DETECTED
S ENT+BONG DNA STL QL NAA+NON-PROBE: NOT DETECTED
SAPO I+II+IV+V RNA STL QL NAA+NON-PROBE: NOT DETECTED
SHIGELLA SP+EIEC IPAH ST NAA+NON-PROBE: NOT DETECTED
V CHOL+PARA+VUL DNA STL QL NAA+NON-PROBE: NOT DETECTED
V CHOLERAE DNA STL QL NAA+NON-PROBE: NOT DETECTED
Y ENTEROCOL DNA STL QL NAA+NON-PROBE: NOT DETECTED

## 2019-01-02 ENCOUNTER — TELEPHONE (OUTPATIENT)
Dept: FAMILY MEDICINE CLINIC | Facility: CLINIC | Age: 39
End: 2019-01-02

## 2019-01-24 ENCOUNTER — TELEPHONE (OUTPATIENT)
Dept: FAMILY MEDICINE CLINIC | Facility: CLINIC | Age: 39
End: 2019-01-24

## 2019-01-24 DIAGNOSIS — M79.671 BILATERAL FOOT PAIN: Primary | ICD-10-CM

## 2019-01-24 DIAGNOSIS — M79.672 BILATERAL FOOT PAIN: Primary | ICD-10-CM

## 2019-01-24 NOTE — TELEPHONE ENCOUNTER
----- Message from Betty Ruano sent at 1/24/2019  9:35 AM EST -----  Contact: [facility] referral request   Pt called in stating that there was to be a referral place for podiatry, but there is not one on her chart. Her feet have been causing extreme pain lately.       She would like to go somehwhere in [facility] if possible.     Call back   [phone]

## 2019-05-31 ENCOUNTER — OFFICE VISIT (OUTPATIENT)
Dept: FAMILY MEDICINE CLINIC | Facility: CLINIC | Age: 39
End: 2019-05-31

## 2019-05-31 VITALS
TEMPERATURE: 97.9 F | HEIGHT: 66 IN | BODY MASS INDEX: 31.26 KG/M2 | RESPIRATION RATE: 16 BRPM | SYSTOLIC BLOOD PRESSURE: 115 MMHG | HEART RATE: 84 BPM | WEIGHT: 194.5 LBS | DIASTOLIC BLOOD PRESSURE: 70 MMHG

## 2019-05-31 DIAGNOSIS — R21 RASH AND NONSPECIFIC SKIN ERUPTION: ICD-10-CM

## 2019-05-31 DIAGNOSIS — J02.0 STREP THROAT: Primary | ICD-10-CM

## 2019-05-31 DIAGNOSIS — M25.50 POLYARTHRALGIA: ICD-10-CM

## 2019-05-31 PROCEDURE — 99213 OFFICE O/P EST LOW 20 MIN: CPT | Performed by: NURSE PRACTITIONER

## 2019-05-31 RX ORDER — PREDNISONE 20 MG/1
TABLET ORAL
Qty: 10 TABLET | Refills: 0 | Status: SHIPPED | OUTPATIENT
Start: 2019-05-31 | End: 2019-08-19

## 2019-05-31 RX ORDER — ETODOLAC 400 MG/1
TABLET, FILM COATED ORAL
COMMUNITY
Start: 2019-05-30 | End: 2019-08-19

## 2019-05-31 RX ORDER — CYCLOBENZAPRINE HCL 10 MG
TABLET ORAL
COMMUNITY
Start: 2019-05-30 | End: 2019-08-19

## 2019-05-31 RX ORDER — AMOXICILLIN AND CLAVULANATE POTASSIUM 500; 125 MG/1; MG/1
TABLET, FILM COATED ORAL
COMMUNITY
Start: 2019-05-30 | End: 2019-06-05

## 2019-05-31 NOTE — PROGRESS NOTES
Subjective   Bladimir Bah is a 39 y.o. female.     History of Present Illness   HA, ST body ache pressure in ears started yesterday  Rash on trunk started 3 days ago  Working at [employer] now and missed yesterday and today    ER records reviewed from [facility] ER   Pt treated with Augmentin 500mg TID for 10 days  CT scan indicates possible strep throat with lymph nodes    The following portions of the patient's history were reviewed and updated as appropriate: allergies, current medications, past family history, past medical history, past social history, past surgical history and problem list.    Review of Systems   Constitutional: Positive for activity change, appetite change, chills, fatigue and fever.   HENT: Positive for sore throat and trouble swallowing. Negative for congestion and ear pain.    Respiratory: Negative.  Negative for cough.    Cardiovascular: Negative.    Gastrointestinal: Negative.    Musculoskeletal: Positive for arthralgias and myalgias. Negative for neck pain.   Skin: Positive for rash.   Neurological: Positive for headache. Negative for dizziness and weakness.   Hematological: Does not bruise/bleed easily.   Psychiatric/Behavioral: Negative for sleep disturbance.       Objective   Physical Exam   Constitutional: She is oriented to person, place, and time. Vital signs are normal. She appears well-developed and well-nourished. No distress.   HENT:   Head: Normocephalic.   Right Ear: Tympanic membrane, external ear and ear canal normal.   Left Ear: Tympanic membrane, external ear and ear canal normal.   Nose: Nose normal. Right sinus exhibits no maxillary sinus tenderness and no frontal sinus tenderness. Left sinus exhibits no maxillary sinus tenderness and no frontal sinus tenderness.   Mouth/Throat: Uvula is midline and mucous membranes are normal. Posterior oropharyngeal erythema present. No oropharyngeal exudate.   Neck: Neck supple.   Cardiovascular: Normal rate, regular rhythm, S1 normal, S2  normal and normal heart sounds.   Pulmonary/Chest: Effort normal and breath sounds normal.   Abdominal: Soft. Bowel sounds are normal.   Lymphadenopathy:        Head (right side): Tonsillar adenopathy present.        Head (left side): Tonsillar adenopathy present.     She has cervical adenopathy.   Neurological: She is alert and oriented to person, place, and time.   Skin: Skin is warm, dry and intact. Capillary refill takes less than 2 seconds. Turgor is normal. Rash noted. Rash is maculopapular. She is not diaphoretic.   Psychiatric: Her behavior is normal. Judgment and thought content normal. Her affect is blunt. She exhibits a depressed mood.   Nursing note and vitals reviewed.        Assessment/Plan   Bladimir was seen today for [facility] er / sore throat and itching rash.    Diagnoses and all orders for this visit:    Strep throat  -     predniSONE (DELTASONE) 20 MG tablet; 1 tablet twice a day    Rash and nonspecific skin eruption  -     predniSONE (DELTASONE) 20 MG tablet; 1 tablet twice a day    Polyarthralgia      Off work until Monday  Finish antibiotics  Will start pt on some Prednisone to help with swelling   Pt to follow up if not improving or go back to ER if difficulty swallowing

## 2019-06-04 ENCOUNTER — TELEPHONE (OUTPATIENT)
Dept: FAMILY MEDICINE CLINIC | Facility: CLINIC | Age: 39
End: 2019-06-04

## 2019-06-04 NOTE — TELEPHONE ENCOUNTER
----- Message from Elizabeth Galvan sent at 6/4/2019  8:38 AM EDT -----  Contact: JUAN;PT CALLED  PT WENT BACK TO WORK OVER THE WEEKEND-BUT NOW SHE FEELS HER  CHEST IS GOING TO EXPLODE-HAVING TROUBLE BREATHING-HAD TO DECREASE THE NUMBER OF ANTIBIOTICS SHE IS TAKING DAILY BECAUSE THREE A DAY GAVE  HER DIARRHEA AND THEN RECTAL BLEEDING    DOES SHE NEED TO HAVE A CHEST XRAY? PLEASE ADVISE    [phone]

## 2019-06-05 NOTE — TELEPHONE ENCOUNTER
Please ask what she is needing? If she already had an XR yesterday then no need to get one today. ajc

## 2019-06-05 NOTE — TELEPHONE ENCOUNTER
Yes I will order CXR. Is pt using an inhaler? Has she finished taking the Prednisone. Is the ST and rash better? Is she still having a fever, chills, night sweats? ajc

## 2019-06-05 NOTE — TELEPHONE ENCOUNTER
Pt went to [place] er yest, had chest xray that was clear. Still taking steroids but not antibiotics, they advised her this was viral. Rash is better, low grade fever and night sweats, hoarse.

## 2019-06-10 NOTE — TELEPHONE ENCOUNTER
Spoke with the patient and she stated that she is felling much better. She still has a little gunk in her chest but she is doing a lot better. I informed her that if she feels worse or has any more issues to give us a call, patient thanked us and verbalized a good understanding

## 2019-08-19 ENCOUNTER — TELEPHONE (OUTPATIENT)
Dept: FAMILY MEDICINE CLINIC | Facility: CLINIC | Age: 39
End: 2019-08-19

## 2019-08-19 ENCOUNTER — OFFICE VISIT (OUTPATIENT)
Dept: FAMILY MEDICINE CLINIC | Facility: CLINIC | Age: 39
End: 2019-08-19

## 2019-08-19 VITALS
TEMPERATURE: 98.1 F | HEART RATE: 80 BPM | HEIGHT: 66 IN | SYSTOLIC BLOOD PRESSURE: 140 MMHG | WEIGHT: 188 LBS | BODY MASS INDEX: 30.22 KG/M2 | DIASTOLIC BLOOD PRESSURE: 80 MMHG | RESPIRATION RATE: 16 BRPM

## 2019-08-19 DIAGNOSIS — F11.93 OPIATE WITHDRAWAL (HCC): Primary | ICD-10-CM

## 2019-08-19 DIAGNOSIS — F41.8 DEPRESSION WITH ANXIETY: Primary | ICD-10-CM

## 2019-08-19 PROCEDURE — 99213 OFFICE O/P EST LOW 20 MIN: CPT | Performed by: PHYSICIAN ASSISTANT

## 2019-08-19 RX ORDER — PROMETHAZINE HYDROCHLORIDE 25 MG/1
25 TABLET ORAL EVERY 6 HOURS PRN
Qty: 30 TABLET | Refills: 2 | Status: SHIPPED | OUTPATIENT
Start: 2019-08-19 | End: 2021-03-11 | Stop reason: SDUPTHER

## 2019-08-19 RX ORDER — IBUPROFEN 800 MG/1
800 TABLET ORAL 3 TIMES DAILY PRN
Refills: 0 | COMMUNITY
Start: 2019-08-16 | End: 2019-09-12 | Stop reason: SDUPTHER

## 2019-08-19 RX ORDER — DULOXETIN HYDROCHLORIDE 20 MG/1
20 CAPSULE, DELAYED RELEASE ORAL DAILY
Qty: 30 CAPSULE | Refills: 2 | Status: SHIPPED | OUTPATIENT
Start: 2019-08-19 | End: 2019-09-12

## 2019-08-19 RX ORDER — HYDROXYZINE 50 MG/1
50 TABLET, FILM COATED ORAL 3 TIMES DAILY
Qty: 90 TABLET | Refills: 5 | Status: SHIPPED | OUTPATIENT
Start: 2019-08-19 | End: 2020-09-08

## 2019-08-19 RX ORDER — MULTIVITAMIN,THERAPEUTIC
1 TABLET ORAL EVERY MORNING
Refills: 0 | COMMUNITY
Start: 2019-08-16

## 2019-08-19 RX ORDER — TRAZODONE HYDROCHLORIDE 50 MG/1
50-100 TABLET ORAL NIGHTLY
Qty: 60 TABLET | Refills: 2 | Status: SHIPPED | OUTPATIENT
Start: 2019-08-19 | End: 2020-05-13

## 2019-08-19 NOTE — TELEPHONE ENCOUNTER
Will have to wait until 8/27 for sleep aid to approved with insurance. Phenergan and atarax also help with sleep in the meantime. Can try cymbalta for nerve related pain if patient is agreeable, but will not be prescribing any controlled substances. SHERLY

## 2019-08-19 NOTE — TELEPHONE ENCOUNTER
Pt can  phenergan tomorrow, got hydroxyzine today but cannot  Trazodone til 8-27-19.  Pt also requesting something for nerve pain.

## 2019-08-19 NOTE — PROGRESS NOTES
"Subjective   Bladimir aBh is a 39 y.o. female.     History of Present Illness   Pt presents for CC of opiate withdrawal.   Pt having issues with sleep, anxiety, nausea, body aches, cold sweats. Feels like she is \"going to die\"  Pt had been in pain management for 12 years. Been on percocet for about that long.  Recently failed UDS for the second time. Tested positive for cocaine. States she was taking to help her stay awake for work.   voluntarily checked herself in to [facility] inpatient care last week. Left against medical advice after a couple of days (records pending) Feels like she was being treated rude. Not given any Rx for home use other than ibuprofen   Pt denies having a drug issue. States she can stop cocaine any time she wishes. Denies she has a substance abuse issue with opiate pain medication     The following portions of the patient's history were reviewed and updated as appropriate: allergies, current medications, past family history, past medical history, past social history, past surgical history and problem list.    Review of Systems   Constitutional: Positive for fatigue. Negative for chills, diaphoresis and fever.   HENT: Negative for congestion, ear discharge, ear pain, hearing loss, nosebleeds, postnasal drip, sinus pressure, sneezing and sore throat.    Eyes: Negative.    Respiratory: Negative.  Negative for cough, chest tightness, shortness of breath and wheezing.    Cardiovascular: Negative.  Negative for chest pain, palpitations and leg swelling.   Gastrointestinal: Positive for nausea. Negative for abdominal distention, abdominal pain, blood in stool, constipation, diarrhea and vomiting.        Upset stomach    Genitourinary: Negative.  Negative for difficulty urinating, dysuria, flank pain, frequency, hematuria and urgency.   Musculoskeletal: Positive for myalgias. Negative for back pain, gait problem, joint swelling, neck pain and neck stiffness.   Skin: Negative.  Negative for color " "change, pallor, rash and wound.   Neurological: Positive for headaches. Negative for dizziness, syncope, weakness, light-headedness and numbness.   Psychiatric/Behavioral: Positive for sleep disturbance. The patient is nervous/anxious.        Objective    Blood pressure 140/80, pulse 80, temperature 98.1 °F (36.7 °C), resp. rate 16, height 167.6 cm (65.98\"), weight 85.3 kg (188 lb).     Physical Exam   Constitutional: She is oriented to person, place, and time. She appears well-developed and well-nourished. She appears distressed.   HENT:   Head: Normocephalic and atraumatic.   Right Ear: External ear normal.   Left Ear: External ear normal.   Nose: Nose normal.   Mouth/Throat: Oropharynx is clear and moist. No oropharyngeal exudate.   Eyes: Conjunctivae are normal.   Neck: Normal range of motion. Neck supple. No tracheal deviation present. No thyromegaly present.   Cardiovascular: Normal rate, regular rhythm and normal heart sounds.   Pulmonary/Chest: Effort normal and breath sounds normal.   Abdominal: Soft. Bowel sounds are normal. She exhibits no distension and no mass. There is no tenderness. There is no rebound and no guarding. No hernia.   Lymphadenopathy:     She has no cervical adenopathy.   Neurological: She is alert and oriented to person, place, and time.   Skin: Skin is warm and dry.   Psychiatric: She has a normal mood and affect. Her behavior is normal. Thought content normal.   Denies drug issue    Nursing note and vitals reviewed.      Assessment/Plan   Bladimir was seen today for fu from pain mgmt.    Diagnoses and all orders for this visit:    Opiate withdrawal (CMS/[facility])  -     hydrOXYzine (ATARAX) 50 MG tablet; Take 1 tablet by mouth 3 (Three) Times a Day.  -     traZODone (DESYREL) 50 MG tablet; Take 1-2 tablets by mouth Every Night.  -     promethazine (PHENERGAN) 25 MG tablet; Take 1 tablet by mouth Every 6 (Six) Hours As Needed for Nausea or Vomiting.    symptomatic treatment as outlined in " plan. May continue with ibuprofen and multivitamin. Has zantac at home   Strongly encourage outpatient treatment facility for opiate and drug dependence, however patient denies having an issue and refuses referral  Instructed patient to seek inpatient care if needed. Pt agrees

## 2019-08-19 NOTE — TELEPHONE ENCOUNTER
----- Message from Betty Ruano sent at 8/19/2019  1:25 PM EDT -----  Contact: DAVID MOISE QUESTION  PT CALLED IN STATING THAT THE MEDICATION THAT WAS SENT FOR HER IS NOT COVERED ON HER PLAN. SHE WANTED TO KNOW I SOMETING ELSE COULD BE CALLED IN . SHE ALSO WANTED MEDICATION FOR HER NERVE PAIN.       CALL BACK   [phone]

## 2019-08-20 ENCOUNTER — TELEPHONE (OUTPATIENT)
Dept: FAMILY MEDICINE CLINIC | Facility: CLINIC | Age: 39
End: 2019-08-20

## 2019-08-20 RX ORDER — PREDNISONE 10 MG/1
TABLET ORAL
Qty: 21 EACH | Refills: 0 | Status: SHIPPED | OUTPATIENT
Start: 2019-08-20 | End: 2019-09-12

## 2019-08-20 NOTE — TELEPHONE ENCOUNTER
----- Message from Reanna Dunne Rep sent at 8/20/2019 11:38 AM EDT -----  Contact: PT; KATE  PATIENT IS CALLING BECAUSE SHE HAS BROKE OUT IN A RASH ALL OVER HER BODY.  SHE IS NOT TAKING ANY NEW MEDICATION.  SHE WANTS TO KNOW DOES SHE NEED TO COME IN A BE SEEN OR CAN GRIS CALL HER IN SOME PREDNISONE     [facility]    PT: [phone]

## 2019-09-12 ENCOUNTER — OFFICE VISIT (OUTPATIENT)
Dept: FAMILY MEDICINE CLINIC | Facility: CLINIC | Age: 39
End: 2019-09-12

## 2019-09-12 VITALS
HEIGHT: 66 IN | HEART RATE: 72 BPM | RESPIRATION RATE: 18 BRPM | DIASTOLIC BLOOD PRESSURE: 76 MMHG | TEMPERATURE: 97.4 F | WEIGHT: 195.2 LBS | SYSTOLIC BLOOD PRESSURE: 102 MMHG | BODY MASS INDEX: 31.37 KG/M2

## 2019-09-12 DIAGNOSIS — J20.9 ACUTE BRONCHITIS, UNSPECIFIED ORGANISM: Primary | ICD-10-CM

## 2019-09-12 DIAGNOSIS — R05.9 COUGH: ICD-10-CM

## 2019-09-12 DIAGNOSIS — R14.0 BLOATING: ICD-10-CM

## 2019-09-12 DIAGNOSIS — E66.09 CLASS 1 OBESITY DUE TO EXCESS CALORIES WITHOUT SERIOUS COMORBIDITY WITH BODY MASS INDEX (BMI) OF 31.0 TO 31.9 IN ADULT: ICD-10-CM

## 2019-09-12 DIAGNOSIS — B80 PINWORMS: ICD-10-CM

## 2019-09-12 DIAGNOSIS — M79.7 FIBROMYALGIA: ICD-10-CM

## 2019-09-12 DIAGNOSIS — R06.02 SOB (SHORTNESS OF BREATH): ICD-10-CM

## 2019-09-12 PROBLEM — E66.811 CLASS 1 OBESITY DUE TO EXCESS CALORIES WITHOUT SERIOUS COMORBIDITY WITH BODY MASS INDEX (BMI) OF 31.0 TO 31.9 IN ADULT: Status: ACTIVE | Noted: 2019-09-12

## 2019-09-12 PROCEDURE — 99214 OFFICE O/P EST MOD 30 MIN: CPT | Performed by: PHYSICIAN ASSISTANT

## 2019-09-12 RX ORDER — DOXYCYCLINE 100 MG/1
100 CAPSULE ORAL EVERY 12 HOURS SCHEDULED
Qty: 20 CAPSULE | Refills: 0 | Status: SHIPPED | OUTPATIENT
Start: 2019-09-12 | End: 2019-11-14

## 2019-09-12 RX ORDER — ONDANSETRON 4 MG/1
TABLET, ORALLY DISINTEGRATING ORAL
COMMUNITY
Start: 2019-09-10 | End: 2020-04-10 | Stop reason: DRUGHIGH

## 2019-09-12 RX ORDER — METHYLPREDNISOLONE 4 MG/1
TABLET ORAL
Qty: 21 EACH | Refills: 0 | Status: SHIPPED | OUTPATIENT
Start: 2019-09-12 | End: 2019-11-14

## 2019-09-12 RX ORDER — CETIRIZINE HYDROCHLORIDE 10 MG/1
TABLET ORAL
COMMUNITY
Start: 2019-09-10 | End: 2020-05-27

## 2019-09-12 RX ORDER — SIMETHICONE 80 MG
80 TABLET,CHEWABLE ORAL EVERY 6 HOURS PRN
Qty: 60 TABLET | Refills: 5 | Status: SHIPPED | OUTPATIENT
Start: 2019-09-12 | End: 2020-03-30

## 2019-09-12 RX ORDER — DULOXETIN HYDROCHLORIDE 30 MG/1
30 CAPSULE, DELAYED RELEASE ORAL DAILY
Qty: 30 CAPSULE | Refills: 5 | Status: SHIPPED | OUTPATIENT
Start: 2019-09-12 | End: 2020-02-25

## 2019-09-12 RX ORDER — CYCLOBENZAPRINE HCL 10 MG
10 TABLET ORAL 3 TIMES DAILY PRN
Qty: 90 TABLET | Refills: 5 | Status: SHIPPED | OUTPATIENT
Start: 2019-09-12 | End: 2020-04-28

## 2019-09-12 RX ORDER — IBUPROFEN 800 MG/1
800 TABLET ORAL 3 TIMES DAILY PRN
Qty: 30 TABLET | Refills: 5 | Status: SHIPPED | OUTPATIENT
Start: 2019-09-12 | End: 2020-04-10 | Stop reason: SDUPTHER

## 2019-09-12 RX ORDER — ALBENDAZOLE 200 MG/1
TABLET, FILM COATED ORAL
Qty: 4 TABLET | Refills: 0 | Status: SHIPPED | OUTPATIENT
Start: 2019-09-12 | End: 2019-11-14

## 2019-09-12 RX ORDER — BROMPHENIRAMINE MALEATE, PSEUDOEPHEDRINE HYDROCHLORIDE, AND DEXTROMETHORPHAN HYDROBROMIDE 2; 30; 10 MG/5ML; MG/5ML; MG/5ML
5 SYRUP ORAL 4 TIMES DAILY PRN
Qty: 150 ML | Refills: 0 | Status: SHIPPED | OUTPATIENT
Start: 2019-09-12 | End: 2019-11-14

## 2019-09-12 NOTE — PROGRESS NOTES
"Subjective   Bladimir Bah is a 39 y.o. female.     History of Present Illness   Pt presents with CC of cough, chest congestion, SOB, LGF and fatigue X 1 week   Some sinus congestion and drainage as well     Daughter recently diagnosed with pinworms at ER. Family has not received treatment. Denies anal itching, abdominal pain or nausea. No pets in the home     Needs refill on fibromyalgia medication (cymbalta) as well as ibuprofen and muscle relaxer. Discharged from pain management last month for failing drug testing (had tested positive for cocaine). Had been on long term narcotic pain medication     The following portions of the patient's history were reviewed and updated as appropriate: allergies, current medications, past family history, past medical history, past social history, past surgical history and problem list.    Review of Systems   Constitutional: Positive for fatigue. Negative for chills, diaphoresis and fever.   HENT: Positive for congestion and postnasal drip. Negative for ear discharge, ear pain, hearing loss, nosebleeds, sinus pressure, sneezing and sore throat.    Eyes: Negative.    Respiratory: Positive for cough and shortness of breath. Negative for chest tightness and wheezing.    Cardiovascular: Negative.  Negative for chest pain, palpitations and leg swelling.   Gastrointestinal: Negative for abdominal distention, abdominal pain, blood in stool, constipation, diarrhea, nausea and vomiting.   Genitourinary: Negative.  Negative for difficulty urinating, dysuria, flank pain, frequency, hematuria and urgency.   Musculoskeletal: Positive for myalgias.   Skin: Negative.  Negative for color change, pallor, rash and wound.   Neurological: Negative for dizziness, syncope, weakness, light-headedness, numbness and headaches.       Objective    Blood pressure 102/76, pulse 72, temperature 97.4 °F (36.3 °C), resp. rate 18, height 167.6 cm (66\"), weight 88.5 kg (195 lb 3.2 oz).   BMI 31.5%     Physical " Exam   Constitutional: She is oriented to person, place, and time. She appears well-developed and well-nourished.   Appears tired    HENT:   Head: Normocephalic and atraumatic.   Right Ear: Tympanic membrane, external ear and ear canal normal.   Left Ear: Tympanic membrane, external ear and ear canal normal.   Nose: Nose normal.   Mouth/Throat: Oropharynx is clear and moist. No oropharyngeal exudate.   Eyes: Conjunctivae are normal.   Neck: Normal range of motion. Neck supple. No tracheal deviation present. No thyromegaly present.   Cardiovascular: Normal rate, regular rhythm, normal heart sounds and intact distal pulses. Exam reveals no gallop and no friction rub.   No murmur heard.  Pulmonary/Chest: Effort normal. No respiratory distress. She has no wheezes. She has rhonchi. She has no rales. She exhibits no tenderness.   Abdominal: Soft. Bowel sounds are normal. She exhibits no distension. There is no tenderness.   Lymphadenopathy:     She has no cervical adenopathy.   Neurological: She is alert and oriented to person, place, and time.   Skin: Skin is warm and dry.   Psychiatric: She has a normal mood and affect. Her behavior is normal. Judgment and thought content normal.   Nursing note and vitals reviewed.      Assessment/Plan   Bladimir was seen today for uri and pin worms.    Diagnoses and all orders for this visit:    Acute bronchitis, unspecified organism  -     doxycycline (MONODOX) 100 MG capsule; Take 1 capsule by mouth Every 12 (Twelve) Hours.  -     methylPREDNISolone (MEDROL, PHYLLIS,) 4 MG tablet; Take as directed on package instructions.    Cough  -     brompheniramine-pseudoephedrine-DM 30-2-10 MG/5ML syrup; Take 5 mL by mouth 4 (Four) Times a Day As Needed for Congestion or Cough.    SOB (shortness of breath)    Pinworms  -     albendazole (ALBENZA) 200 MG tablet; Take two tablets by mouth today and repeat in 2 weeks    Fibromyalgia  -     ibuprofen (ADVIL,MOTRIN) 800 MG tablet; Take 1 tablet by mouth  3 (Three) Times a Day As Needed for Mild Pain  or Moderate Pain .  -     cyclobenzaprine (FLEXERIL) 10 MG tablet; Take 1 tablet by mouth 3 (Three) Times a Day As Needed for Muscle Spasms.  -     DULoxetine (CYMBALTA) 30 MG capsule; Take 1 capsule by mouth Daily.    Bloating  -     simethicone (GAS-X) 80 MG chewable tablet; Chew 1 tablet Every 6 (Six) Hours As Needed for Flatulence.    Class 1 obesity due to excess calories without serious comorbidity with body mass index (BMI) of 31.0 to 31.9 in adult      Treatment as outlined in plan   Refills provided. Will increase cymbalta to 30 mg for fibromyalgia pain. No controlled medications due to discharge from pain management   albenza for household exposure to pinworms

## 2019-09-20 ENCOUNTER — TELEPHONE (OUTPATIENT)
Dept: FAMILY MEDICINE CLINIC | Facility: CLINIC | Age: 39
End: 2019-09-20

## 2019-09-20 NOTE — TELEPHONE ENCOUNTER
PATIENT IS REQUESTING THE [facility] BRAND OF PIN WORM MEDICATION BE SENT IN TO [facility] IN [place].  [phone]

## 2019-09-20 NOTE — TELEPHONE ENCOUNTER
It needs a new Rx apparently of Pyrantel (their OTC medication). She cannot afford the generic of the original medicine that was sent in.

## 2019-09-20 NOTE — TELEPHONE ENCOUNTER
REESES PINWORM 144 G SUSPENSION IS THE MEDICATION THAT THEY ARE REQUESTING TO BE SENT IN BECAUSE IT IS COVERED BY THEIR INSURANCE.

## 2019-11-14 ENCOUNTER — OFFICE VISIT (OUTPATIENT)
Dept: FAMILY MEDICINE CLINIC | Facility: CLINIC | Age: 39
End: 2019-11-14

## 2019-11-14 VITALS
BODY MASS INDEX: 32.24 KG/M2 | SYSTOLIC BLOOD PRESSURE: 110 MMHG | TEMPERATURE: 98.3 F | DIASTOLIC BLOOD PRESSURE: 80 MMHG | HEART RATE: 90 BPM | OXYGEN SATURATION: 99 % | WEIGHT: 200.6 LBS | HEIGHT: 66 IN | RESPIRATION RATE: 16 BRPM

## 2019-11-14 DIAGNOSIS — R06.83 SNORING: ICD-10-CM

## 2019-11-14 DIAGNOSIS — M79.7 FIBROMYALGIA: ICD-10-CM

## 2019-11-14 DIAGNOSIS — R20.2 PARESTHESIA OF BOTH HANDS: ICD-10-CM

## 2019-11-14 DIAGNOSIS — R53.82 CHRONIC FATIGUE: Primary | ICD-10-CM

## 2019-11-14 DIAGNOSIS — G89.4 CHRONIC PAIN SYNDROME: ICD-10-CM

## 2019-11-14 DIAGNOSIS — R10.84 ABDOMINAL DISCOMFORT, GENERALIZED: ICD-10-CM

## 2019-11-14 DIAGNOSIS — E55.9 VITAMIN D DEFICIENCY: ICD-10-CM

## 2019-11-14 LAB
BILIRUB BLD-MCNC: NEGATIVE MG/DL
CLARITY, POC: CLEAR
COLOR UR: YELLOW
GLUCOSE UR STRIP-MCNC: NEGATIVE MG/DL
KETONES UR QL: NEGATIVE
LEUKOCYTE EST, POC: NEGATIVE
NITRITE UR-MCNC: NEGATIVE MG/ML
PH UR: 6 [PH] (ref 5–8)
PROT UR STRIP-MCNC: NEGATIVE MG/DL
RBC # UR STRIP: NEGATIVE /UL
SP GR UR: 1.01 (ref 1–1.03)
UROBILINOGEN UR QL: NORMAL

## 2019-11-14 PROCEDURE — 99214 OFFICE O/P EST MOD 30 MIN: CPT | Performed by: NURSE PRACTITIONER

## 2019-11-14 PROCEDURE — 96372 THER/PROPH/DIAG INJ SC/IM: CPT | Performed by: NURSE PRACTITIONER

## 2019-11-14 RX ORDER — KETOROLAC TROMETHAMINE 30 MG/ML
60 INJECTION, SOLUTION INTRAMUSCULAR; INTRAVENOUS ONCE
Status: COMPLETED | OUTPATIENT
Start: 2019-11-14 | End: 2019-11-14

## 2019-11-14 RX ADMIN — KETOROLAC TROMETHAMINE 60 MG: 30 INJECTION, SOLUTION INTRAMUSCULAR; INTRAVENOUS at 15:45

## 2019-11-14 NOTE — PROGRESS NOTES
Subjective   Bladimir Bah is a 39 y.o. female.     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    Review of Systems   Constitutional: Positive for activity change, fatigue and unexpected weight gain.   HENT: Negative.    Eyes: Negative.    Respiratory: Negative.    Cardiovascular: Negative.  Negative for chest pain and palpitations.   Gastrointestinal: Positive for abdominal distention, abdominal pain and diarrhea. Negative for constipation, nausea, vomiting, GERD and indigestion.   Endocrine: Negative.    Genitourinary: Negative.  Negative for difficulty urinating, flank pain, frequency, hematuria, menstrual problem and urinary incontinence.   Musculoskeletal: Positive for arthralgias and myalgias. Negative for neck pain and neck stiffness.   Skin: Negative.    Allergic/Immunologic: Negative.    Neurological: Positive for weakness, numbness and headache. Negative for dizziness, light-headedness and memory problem.   Hematological: Negative.    Psychiatric/Behavioral: Positive for sleep disturbance, depressed mood and stress. The patient is nervous/anxious.        Objective   Physical Exam   Constitutional: She is oriented to person, place, and time.  She appears well-developed and well-nourished. She appears distressed.   HENT:   Head: Normocephalic.   Nose: Nose normal.   Neck: Neck supple. No JVD present. No thyromegaly present.   Cardiovascular: Normal rate, regular rhythm and normal heart sounds.   Pulmonary/Chest: Effort normal and breath sounds normal.   Musculoskeletal: She exhibits no edema.   Lymphadenopathy:     She has no cervical adenopathy.   Neurological: She is alert and oriented to person, place, and time.   Skin: Skin is warm and dry. She is not diaphoretic.   Psychiatric: Her speech is normal and behavior is normal. Judgment and thought content normal. Cognition and memory are normal. She exhibits a depressed mood.   Nursing note and vitals reviewed.        Assessment/Plan   Bladimir was seen today for diarrhea.    Diagnoses and all orders for this visit:    Chronic fatigue  -     TSH Rfx On Abnormal To Free T4  -     Vitamin B12  -     CBC & Differential  -     Ambulatory Referral to Sleep Medicine    Fibromyalgia  -     TSH Rfx On Abnormal To Free T4  -     Vitamin B12  -     Sedimentation Rate  -     Comprehensive Metabolic Panel  -     Zinc, RBC  -     C-reactive protein  -     BREANNE by IFA, Reflex 9-biomarkers profile    Abdominal discomfort, generalized  -     POCT urinalysis dipstick, automated  -     Comprehensive Metabolic Panel    Vitamin D deficiency  -     Vitamin D 25 hydroxy    Paresthesia of both hands  -     MTHFR Mutation    Snoring  -     Ambulatory Referral to Sleep Medicine    Chronic pain syndrome  -     MAGNESIUM, RBC  -     Sedimentation Rate  -     C-reactive protein  -     BREANNE by IFA, Reflex 9-biomarkers profile  -     ketorolac (TORADOL) injection 60 mg    UA normal for infections  Will check labs and notify pt of results  Will give pt Toradol injection  Will have pt continue to take Dicyclomine daily since diarrhea has returned

## 2019-11-15 ENCOUNTER — TELEPHONE (OUTPATIENT)
Dept: FAMILY MEDICINE CLINIC | Facility: CLINIC | Age: 39
End: 2019-11-15

## 2019-11-15 RX ORDER — FLUCONAZOLE 150 MG/1
150 TABLET ORAL DAILY
Qty: 2 TABLET | Refills: 0 | Status: SHIPPED | OUTPATIENT
Start: 2019-11-15 | End: 2020-03-30

## 2019-11-15 NOTE — TELEPHONE ENCOUNTER
JUAN MENDEZ         PT CALLED IN STATING THAT SHE NEEDS A RX FOR YEAST INFECTION.     PLEASE CALL IT IN TO [facility]      CALL BACK   [phone]

## 2019-11-19 ENCOUNTER — OFFICE VISIT (OUTPATIENT)
Dept: FAMILY MEDICINE CLINIC | Facility: CLINIC | Age: 39
End: 2019-11-19

## 2019-11-19 VITALS
HEIGHT: 66 IN | SYSTOLIC BLOOD PRESSURE: 122 MMHG | HEART RATE: 80 BPM | RESPIRATION RATE: 18 BRPM | BODY MASS INDEX: 31.98 KG/M2 | WEIGHT: 199 LBS | TEMPERATURE: 97.6 F | DIASTOLIC BLOOD PRESSURE: 64 MMHG

## 2019-11-19 DIAGNOSIS — J34.89 NASAL LESION: Primary | ICD-10-CM

## 2019-11-19 PROCEDURE — 99213 OFFICE O/P EST LOW 20 MIN: CPT | Performed by: PHYSICIAN ASSISTANT

## 2019-11-19 RX ORDER — VALACYCLOVIR HYDROCHLORIDE 1 G/1
1000 TABLET, FILM COATED ORAL 3 TIMES DAILY
Qty: 21 TABLET | Refills: 0 | Status: SHIPPED | OUTPATIENT
Start: 2019-11-19 | End: 2020-06-18

## 2019-11-19 NOTE — PROGRESS NOTES
"Subjective   ShaDominic Bah is a 39 y.o. female.     History of Present Illness   Pt presents with CC of sore on outside and on inside of nose.   Two tense blisters on left external nose   And yellow crusty lesion on inside of L side of nose   Developed suddenly yesterday   Concerned for MRSA (nephew has). No personal hx  Has had fever blisters in the past   No additional symptoms     The following portions of the patient's history were reviewed and updated as appropriate: allergies, current medications, past family history, past medical history, past social history, past surgical history and problem list.    Review of Systems   Constitutional: Negative for chills and fever.   HENT: Negative for congestion, nosebleeds, postnasal drip, rhinorrhea, sinus pressure, sinus pain, sore throat and trouble swallowing.    Eyes: Negative.    Respiratory: Negative for cough, shortness of breath and wheezing.    Cardiovascular: Negative for chest pain.   Gastrointestinal: Negative for abdominal pain, diarrhea and vomiting.   Skin:        As noted per HPI    Neurological: Negative for headaches.       Objective    Blood pressure 122/64, pulse 80, temperature 97.6 °F (36.4 °C), resp. rate 18, height 167.6 cm (66\"), weight 90.3 kg (199 lb).     Physical Exam   Constitutional: She is oriented to person, place, and time. She appears well-developed and well-nourished. No distress.   HENT:   Head: Normocephalic.   Right Ear: External ear normal.   Left Ear: External ear normal.   Nose:       Mouth/Throat: Oropharynx is clear and moist. No oropharyngeal exudate.   Eyes: Conjunctivae are normal.   Neck: Normal range of motion. Neck supple.   Cardiovascular: Normal rate, regular rhythm and normal heart sounds.   Pulmonary/Chest: Effort normal and breath sounds normal.   Lymphadenopathy:     She has no cervical adenopathy.   Neurological: She is alert and oriented to person, place, and time.   Skin: Skin is warm and dry.   Psychiatric: " She has a normal mood and affect. Her behavior is normal. Judgment and thought content normal.   Nursing note and vitals reviewed.      Assessment/Plan   Bladimir was seen today for sore.    Diagnoses and all orders for this visit:    Nasal lesion  -     valACYclovir (VALTREX) 1000 MG tablet; Take 1 tablet by mouth 3 (Three) Times a Day.  -     mupirocin (BACTROBAN) 2 % ointment; Apply  topically to the appropriate area as directed 3 (Three) Times a Day.    blisters appear viral in nature. Treatment as outlined in plan. Call if not improving

## 2019-11-21 LAB
25(OH)D3+25(OH)D2 SERPL-MCNC: 15.7 NG/ML (ref 30–100)
ALBUMIN SERPL-MCNC: 4.2 G/DL (ref 3.5–5.5)
ALBUMIN/GLOB SERPL: 1.8 {RATIO} (ref 1.2–2.2)
ALP SERPL-CCNC: 114 IU/L (ref 39–117)
ALT SERPL-CCNC: 10 IU/L (ref 0–32)
ANA TITR SER IF: NEGATIVE {TITER}
AST SERPL-CCNC: 14 IU/L (ref 0–40)
BASOPHILS # BLD AUTO: 0 X10E3/UL (ref 0–0.2)
BASOPHILS NFR BLD AUTO: 0 %
BILIRUB SERPL-MCNC: <0.2 MG/DL (ref 0–1.2)
BUN SERPL-MCNC: 11 MG/DL (ref 6–20)
BUN/CREAT SERPL: 12 (ref 9–23)
CALCIUM SERPL-MCNC: 9.5 MG/DL (ref 8.7–10.2)
CHLORIDE SERPL-SCNC: 101 MMOL/L (ref 96–106)
CO2 SERPL-SCNC: 27 MMOL/L (ref 20–29)
CREAT SERPL-MCNC: 0.94 MG/DL (ref 0.57–1)
CRP SERPL-MCNC: 2 MG/L (ref 0–10)
EOSINOPHIL # BLD AUTO: 0.2 X10E3/UL (ref 0–0.4)
EOSINOPHIL NFR BLD AUTO: 2 %
ERYTHROCYTE [DISTWIDTH] IN BLOOD BY AUTOMATED COUNT: 14 % (ref 12.3–15.4)
ERYTHROCYTE [SEDIMENTATION RATE] IN BLOOD BY WESTERGREN METHOD: 2 MM/HR (ref 0–32)
GLOBULIN SER CALC-MCNC: 2.4 G/DL (ref 1.5–4.5)
GLUCOSE SERPL-MCNC: 91 MG/DL (ref 65–99)
HCT VFR BLD AUTO: 38.4 % (ref 34–46.6)
HGB BLD-MCNC: 12.7 G/DL (ref 11.1–15.9)
IMM GRANULOCYTES # BLD AUTO: 0 X10E3/UL (ref 0–0.1)
IMM GRANULOCYTES NFR BLD AUTO: 0 %
LABORATORY COMMENT REPORT: NORMAL
LYMPHOCYTES # BLD AUTO: 3.7 X10E3/UL (ref 0.7–3.1)
LYMPHOCYTES NFR BLD AUTO: 34 %
MAGNESIUM RBC-MCNC: 4.5 MG/DL (ref 4.2–6.8)
MCH RBC QN AUTO: 26.8 PG (ref 26.6–33)
MCHC RBC AUTO-ENTMCNC: 33.1 G/DL (ref 31.5–35.7)
MCV RBC AUTO: 81 FL (ref 79–97)
MONOCYTES # BLD AUTO: 0.6 X10E3/UL (ref 0.1–0.9)
MONOCYTES NFR BLD AUTO: 6 %
MTHFR GENE MUT ANL BLD/T: NORMAL
NEUTROPHILS # BLD AUTO: 6.2 X10E3/UL (ref 1.4–7)
NEUTROPHILS NFR BLD AUTO: 58 %
PLATELET # BLD AUTO: 289 X10E3/UL (ref 150–450)
POTASSIUM SERPL-SCNC: 4.5 MMOL/L (ref 3.5–5.2)
PROT SERPL-MCNC: 6.6 G/DL (ref 6–8.5)
RBC # BLD AUTO: 4.73 X10E6/UL (ref 3.77–5.28)
SODIUM SERPL-SCNC: 142 MMOL/L (ref 134–144)
TSH SERPL DL<=0.005 MIU/L-ACNC: 3.43 UIU/ML (ref 0.45–4.5)
VIT B12 SERPL-MCNC: 359 PG/ML (ref 232–1245)
WBC # BLD AUTO: 10.7 X10E3/UL (ref 3.4–10.8)
ZINC RBC-MCNC: 1172 UG/DL (ref 878–1660)

## 2019-11-22 DIAGNOSIS — E55.9 VITAMIN D DEFICIENCY: ICD-10-CM

## 2019-11-22 RX ORDER — ERGOCALCIFEROL 1.25 MG/1
50000 CAPSULE ORAL
Qty: 12 CAPSULE | Refills: 4 | Status: SHIPPED | OUTPATIENT
Start: 2019-11-22 | End: 2020-04-10 | Stop reason: SDUPTHER

## 2019-11-27 ENCOUNTER — TELEPHONE (OUTPATIENT)
Dept: FAMILY MEDICINE CLINIC | Facility: CLINIC | Age: 39
End: 2019-11-27

## 2019-11-27 NOTE — TELEPHONE ENCOUNTER
Patient calling to speak with clinical staff - states that she missed work yesterday - needs a note.   Having her IBS symptoms.    Would like someone to call [phone] to discuss if she needs to come in.

## 2019-11-27 NOTE — TELEPHONE ENCOUNTER
No note can be provided as she did not call yesterday and was not seen in office. We do not have any appointment availabilities.  She will need to be seen at [facility] did get documentation. SHERLY

## 2020-02-25 ENCOUNTER — OFFICE VISIT (OUTPATIENT)
Dept: FAMILY MEDICINE CLINIC | Facility: CLINIC | Age: 40
End: 2020-02-25

## 2020-02-25 VITALS
OXYGEN SATURATION: 99 % | BODY MASS INDEX: 33.43 KG/M2 | HEART RATE: 104 BPM | DIASTOLIC BLOOD PRESSURE: 86 MMHG | SYSTOLIC BLOOD PRESSURE: 124 MMHG | HEIGHT: 66 IN | WEIGHT: 208 LBS | TEMPERATURE: 97.6 F

## 2020-02-25 DIAGNOSIS — G89.29 OTHER CHRONIC PAIN: Primary | ICD-10-CM

## 2020-02-25 DIAGNOSIS — M35.3 POLYMYALGIA (HCC): ICD-10-CM

## 2020-02-25 PROCEDURE — 99213 OFFICE O/P EST LOW 20 MIN: CPT | Performed by: PHYSICIAN ASSISTANT

## 2020-02-25 PROCEDURE — 96372 THER/PROPH/DIAG INJ SC/IM: CPT | Performed by: PHYSICIAN ASSISTANT

## 2020-02-25 RX ORDER — DULOXETIN HYDROCHLORIDE 60 MG/1
60 CAPSULE, DELAYED RELEASE ORAL DAILY
Qty: 30 CAPSULE | Refills: 5 | Status: SHIPPED | OUTPATIENT
Start: 2020-02-25 | End: 2021-09-07 | Stop reason: SDUPTHER

## 2020-02-25 RX ORDER — PREDNISONE 10 MG/1
TABLET ORAL
Qty: 21 EACH | Refills: 0 | Status: SHIPPED | OUTPATIENT
Start: 2020-02-25 | End: 2020-03-30

## 2020-02-25 RX ORDER — KETOROLAC TROMETHAMINE 30 MG/ML
60 INJECTION, SOLUTION INTRAMUSCULAR; INTRAVENOUS ONCE
Status: COMPLETED | OUTPATIENT
Start: 2020-02-25 | End: 2020-02-25

## 2020-02-25 RX ORDER — CONJUGATED ESTROGENS 0.62 MG/G
CREAM VAGINAL
COMMUNITY
Start: 2020-02-20

## 2020-02-25 RX ADMIN — KETOROLAC TROMETHAMINE 60 MG: 30 INJECTION, SOLUTION INTRAMUSCULAR; INTRAVENOUS at 16:07

## 2020-02-25 NOTE — PROGRESS NOTES
"Subjective   OleDominic Bah is a 40 y.o. female.     History of Present Illness   Pt presents with CC of chronic pain   Dismissed from pain management last year for failed UDS x 2. Pt was taking cocaine.   Denied having a drug issue  Feels achy all over. States everything hurts. Been worse over the last week   Denies taking any drugs currently   States she would like a shot today to help with the pain   No recent illness. Denies URI symptoms.     The following portions of the patient's history were reviewed and updated as appropriate: allergies, current medications, past family history, past medical history, past social history, past surgical history and problem list.    Review of Systems   Constitutional: Negative.  Negative for chills, diaphoresis, fatigue and fever.   HENT: Negative.  Negative for congestion, ear discharge, ear pain, hearing loss, nosebleeds, postnasal drip, sinus pressure, sneezing and sore throat.    Eyes: Negative.    Respiratory: Negative.  Negative for cough, chest tightness, shortness of breath and wheezing.    Cardiovascular: Negative.  Negative for chest pain, palpitations and leg swelling.   Gastrointestinal: Negative for abdominal distention, abdominal pain, blood in stool, constipation, diarrhea, nausea and vomiting.   Genitourinary: Negative.  Negative for difficulty urinating, dysuria, flank pain, frequency, hematuria and urgency.   Musculoskeletal: Positive for arthralgias and myalgias. Negative for back pain, gait problem, joint swelling, neck pain and neck stiffness.   Skin: Negative.  Negative for color change, pallor, rash and wound.   Neurological: Negative for dizziness, syncope, weakness, light-headedness, numbness and headaches.       Objective    Blood pressure 124/86, pulse 104, temperature 97.6 °F (36.4 °C), height 167.6 cm (66\"), weight 94.3 kg (208 lb), SpO2 99 %.     Physical Exam   Constitutional: She is oriented to person, place, and time. She appears well-developed " and well-nourished. She appears distressed.   HENT:   Head: Normocephalic and atraumatic.   Right Ear: External ear normal.   Left Ear: External ear normal.   Nose: Nose normal.   Mouth/Throat: Oropharynx is clear and moist. No oropharyngeal exudate.   Eyes: Conjunctivae are normal.   Neck: Normal range of motion. Neck supple. No tracheal deviation present. No thyromegaly present.   Cardiovascular: Normal rate, regular rhythm and normal heart sounds.   Pulmonary/Chest: Effort normal and breath sounds normal. No respiratory distress. She has no wheezes. She has no rales. She exhibits no tenderness.   Musculoskeletal:   Slow antalgic gait   TTP diffusely    Lymphadenopathy:     She has no cervical adenopathy.   Neurological: She is alert and oriented to person, place, and time.   Skin: Skin is warm and dry.   Psychiatric: She has a normal mood and affect. Her behavior is normal. Judgment and thought content normal.   Nursing note and vitals reviewed.      Assessment/Plan   Bladimir was seen today for generalized body aches.    Diagnoses and all orders for this visit:    Other chronic pain  -     ketorolac (TORADOL) injection 60 mg  -     predniSONE (DELTASONE) 10 MG (21) tablet pack; Use as directed on package    Polymyalgia (CMS/HCC)  -     ketorolac (TORADOL) injection 60 mg  -     predniSONE (DELTASONE) 10 MG (21) tablet pack; Use as directed on package  -     DULoxetine (CYMBALTA) 60 MG capsule; Take 1 capsule by mouth Daily.    treatment as outlined in plan   Will increase cymbalta to 60 mg

## 2020-03-30 ENCOUNTER — TELEPHONE (OUTPATIENT)
Dept: FAMILY MEDICINE CLINIC | Facility: CLINIC | Age: 40
End: 2020-03-30

## 2020-03-30 ENCOUNTER — OFFICE VISIT (OUTPATIENT)
Dept: FAMILY MEDICINE CLINIC | Facility: CLINIC | Age: 40
End: 2020-03-30

## 2020-03-30 VITALS
SYSTOLIC BLOOD PRESSURE: 120 MMHG | DIASTOLIC BLOOD PRESSURE: 80 MMHG | HEART RATE: 88 BPM | BODY MASS INDEX: 32.62 KG/M2 | WEIGHT: 203 LBS | RESPIRATION RATE: 16 BRPM | HEIGHT: 66 IN | TEMPERATURE: 97.9 F

## 2020-03-30 DIAGNOSIS — B37.9 YEAST INFECTION: ICD-10-CM

## 2020-03-30 DIAGNOSIS — J06.9 ACUTE URI: Primary | ICD-10-CM

## 2020-03-30 DIAGNOSIS — H66.002 ACUTE SUPPURATIVE OTITIS MEDIA OF LEFT EAR WITHOUT SPONTANEOUS RUPTURE OF TYMPANIC MEMBRANE, RECURRENCE NOT SPECIFIED: ICD-10-CM

## 2020-03-30 DIAGNOSIS — Z91.09 ENVIRONMENTAL ALLERGIES: ICD-10-CM

## 2020-03-30 DIAGNOSIS — J34.89 NASAL SORE: ICD-10-CM

## 2020-03-30 DIAGNOSIS — R05.9 COUGH: ICD-10-CM

## 2020-03-30 PROCEDURE — 99213 OFFICE O/P EST LOW 20 MIN: CPT | Performed by: PHYSICIAN ASSISTANT

## 2020-03-30 RX ORDER — FLUCONAZOLE 150 MG/1
150 TABLET ORAL ONCE
Qty: 2 TABLET | Refills: 0 | Status: SHIPPED | OUTPATIENT
Start: 2020-03-30 | End: 2020-03-30

## 2020-03-30 RX ORDER — FLUTICASONE PROPIONATE 50 MCG
2 SPRAY, SUSPENSION (ML) NASAL DAILY
Qty: 16 G | Refills: 5 | Status: CANCELLED | OUTPATIENT
Start: 2020-03-30

## 2020-03-30 RX ORDER — AMOXICILLIN AND CLAVULANATE POTASSIUM 875; 125 MG/1; MG/1
1 TABLET, FILM COATED ORAL 2 TIMES DAILY
Qty: 14 TABLET | Refills: 0 | Status: SHIPPED | OUTPATIENT
Start: 2020-03-30 | End: 2020-04-10

## 2020-03-30 RX ORDER — BROMPHENIRAMINE MALEATE, PSEUDOEPHEDRINE HYDROCHLORIDE, AND DEXTROMETHORPHAN HYDROBROMIDE 2; 30; 10 MG/5ML; MG/5ML; MG/5ML
5 SYRUP ORAL 4 TIMES DAILY PRN
Qty: 150 ML | Refills: 0 | Status: SHIPPED | OUTPATIENT
Start: 2020-03-30 | End: 2020-04-28

## 2020-03-30 RX ORDER — METHYLPREDNISOLONE 4 MG/1
TABLET ORAL
Qty: 21 EACH | Refills: 0 | Status: SHIPPED | OUTPATIENT
Start: 2020-03-30 | End: 2020-04-10

## 2020-03-30 NOTE — TELEPHONE ENCOUNTER
PT IS CALLING TO REQUEST MEDICINE FOR POSSIBLE YEAST INFECTION.     PT IS HAVING WHITE DISCHARGE THAT LOOKS LIKE COTTAGE CHEESE. , ITCHING, NO BURNING.     PT REQUESTING REFILL OF TWO (2) PILLS, SAYS THAT THE ONE DOESN'T WORK.     fluconazole (DIFLUCAN) 150 MG tablet    [facility] [place] - CONFIRMED.     PT CALLBACK NUMBER [phone]

## 2020-03-30 NOTE — PROGRESS NOTES
"Subjective   Bladimir Bah is a 40 y.o. female.     History of Present Illness   Pt presents with CC of left ear pain, cough, PND, sneezing, congestion, sore throat X 2-3 days.   History of seasonal allergies   No fever. Very fatigued   Dennis SOB.   No known sick contacts.   Tender sore in L nostril. Has had this issue before. Needs refill on mupirocin.     Also requesting rx for diflucan. Having vaginal itching and clumpy white discharge     The following portions of the patient's history were reviewed and updated as appropriate: allergies, current medications, past family history, past medical history, past social history, past surgical history and problem list.    Review of Systems   Constitutional: Positive for fatigue. Negative for chills, diaphoresis and fever.   HENT: Positive for congestion, ear pain, postnasal drip, sneezing and sore throat. Negative for ear discharge, hearing loss, nosebleeds, sinus pressure and trouble swallowing.    Eyes: Negative.    Respiratory: Positive for cough. Negative for chest tightness, shortness of breath and wheezing.    Cardiovascular: Negative.  Negative for chest pain, palpitations and leg swelling.   Gastrointestinal: Negative for abdominal distention, abdominal pain, blood in stool, constipation, diarrhea, nausea and vomiting.   Genitourinary: Positive for vaginal discharge. Negative for difficulty urinating, dysuria, flank pain, frequency, hematuria and urgency.   Skin: Negative.  Negative for color change, pallor, rash and wound.   Neurological: Negative for dizziness, syncope, weakness, light-headedness, numbness and headaches.       Objective    Blood pressure 120/80, pulse 88, temperature 97.9 °F (36.6 °C), resp. rate 16, height 167.6 cm (65.98\"), weight 92.1 kg (203 lb).     Physical Exam   Constitutional: She is oriented to person, place, and time. She appears well-developed and well-nourished.   HENT:   Head: Normocephalic and atraumatic.   Right Ear: Tympanic " membrane, external ear and ear canal normal.   Left Ear: External ear normal. Tympanic membrane is erythematous and bulging. Tympanic membrane is not perforated and not retracted.   Nose: Mucosal edema present. Right sinus exhibits no maxillary sinus tenderness and no frontal sinus tenderness. Left sinus exhibits no maxillary sinus tenderness and no frontal sinus tenderness.       Mouth/Throat: Oropharynx is clear and moist. No oropharyngeal exudate, posterior oropharyngeal edema or posterior oropharyngeal erythema.   Eyes: Conjunctivae are normal.   Neck: Normal range of motion. Neck supple. No tracheal deviation present. No thyromegaly present.   Cardiovascular: Normal rate, regular rhythm and normal heart sounds.   Pulmonary/Chest: Effort normal and breath sounds normal. No respiratory distress. She has no wheezes. She has no rales. She exhibits no tenderness.   Hacking cough    Lymphadenopathy:     She has no cervical adenopathy.   Neurological: She is alert and oriented to person, place, and time.   Skin: Skin is warm and dry.   Psychiatric: She has a normal mood and affect. Her behavior is normal. Judgment and thought content normal.   Nursing note and vitals reviewed.      Assessment/Plan   Bladimir was seen today for sore throat, l ear pain & cough and tender nasal lesion.    Diagnoses and all orders for this visit:    Acute URI  -     amoxicillin-clavulanate (Augmentin) 875-125 MG per tablet; Take 1 tablet by mouth 2 (Two) Times a Day.  -     methylPREDNISolone (MEDROL, PHYLLIS,) 4 MG tablet; Take as directed on package instructions.    Nasal sore  -     mupirocin (Bactroban) 2 % ointment; Apply  topically to the appropriate area as directed 3 (Three) Times a Day.    Yeast infection  -     fluconazole (DIFLUCAN) 150 MG tablet; Take 1 tablet by mouth 1 (One) Time for 1 dose. May repeat in 3 days if symptoms persist.    Environmental allergies  -     methylPREDNISolone (MEDROL, PHYLLIS,) 4 MG tablet; Take as directed  on package instructions.    Acute suppurative otitis media of left ear without spontaneous rupture of tympanic membrane, recurrence not specified  -     amoxicillin-clavulanate (Augmentin) 875-125 MG per tablet; Take 1 tablet by mouth 2 (Two) Times a Day.    Cough  -     brompheniramine-pseudoephedrine-DM 30-2-10 MG/5ML syrup; Take 5 mL by mouth 4 (Four) Times a Day As Needed for Congestion or Cough.    treatment as outlined in plan. F/u INB

## 2020-04-07 ENCOUNTER — TELEPHONE (OUTPATIENT)
Dept: FAMILY MEDICINE CLINIC | Facility: CLINIC | Age: 40
End: 2020-04-07

## 2020-04-07 NOTE — TELEPHONE ENCOUNTER
It looks like from the office note that Savage has given you everything that you could have been given to help you feel better. It is most likely allergy related symptoms of ST ( are you taking any OTC allergy medication? If not I would recommend that) or possibly viral illness which there is not anything to give you but tell you to rest and drink fluids.     Savage will return to office on Monday. [facility]

## 2020-04-07 NOTE — TELEPHONE ENCOUNTER
Patient is calling because she is not feeling any better. Patient was seen by Savage Johnson on March 30th and she still having a sore throat, vomiting, lethargic, and wants to know what else she can do. Please advise and call back    [phone]

## 2020-04-10 ENCOUNTER — OFFICE VISIT (OUTPATIENT)
Dept: FAMILY MEDICINE CLINIC | Facility: CLINIC | Age: 40
End: 2020-04-10

## 2020-04-10 VITALS
HEART RATE: 83 BPM | TEMPERATURE: 98 F | OXYGEN SATURATION: 98 % | RESPIRATION RATE: 16 BRPM | BODY MASS INDEX: 33.01 KG/M2 | DIASTOLIC BLOOD PRESSURE: 80 MMHG | WEIGHT: 205.4 LBS | SYSTOLIC BLOOD PRESSURE: 140 MMHG | HEIGHT: 66 IN

## 2020-04-10 DIAGNOSIS — K86.1 IDIOPATHIC CHRONIC PANCREATITIS (HCC): Primary | ICD-10-CM

## 2020-04-10 DIAGNOSIS — R16.0 LIVER MASS: ICD-10-CM

## 2020-04-10 DIAGNOSIS — E55.9 VITAMIN D DEFICIENCY: ICD-10-CM

## 2020-04-10 DIAGNOSIS — M79.7 FIBROMYALGIA: ICD-10-CM

## 2020-04-10 DIAGNOSIS — K21.9 GASTROESOPHAGEAL REFLUX DISEASE WITHOUT ESOPHAGITIS: ICD-10-CM

## 2020-04-10 DIAGNOSIS — R11.0 NAUSEA: ICD-10-CM

## 2020-04-10 PROCEDURE — 99214 OFFICE O/P EST MOD 30 MIN: CPT | Performed by: NURSE PRACTITIONER

## 2020-04-10 RX ORDER — ONDANSETRON 8 MG/1
8 TABLET, ORALLY DISINTEGRATING ORAL EVERY 8 HOURS PRN
Qty: 30 TABLET | Refills: 1 | Status: SHIPPED | OUTPATIENT
Start: 2020-04-10 | End: 2020-05-20

## 2020-04-10 RX ORDER — PREDNISONE 10 MG/1
TABLET ORAL
COMMUNITY
Start: 2020-04-09 | End: 2020-06-17

## 2020-04-10 RX ORDER — PANTOPRAZOLE SODIUM 40 MG/1
40 TABLET, DELAYED RELEASE ORAL DAILY
Qty: 90 TABLET | Refills: 1 | Status: SHIPPED | OUTPATIENT
Start: 2020-04-10 | End: 2020-09-29

## 2020-04-10 RX ORDER — DOXYCYCLINE 100 MG/1
CAPSULE ORAL
COMMUNITY
Start: 2020-04-09 | End: 2020-04-28

## 2020-04-10 RX ORDER — IBUPROFEN 800 MG/1
800 TABLET ORAL 3 TIMES DAILY PRN
Qty: 90 TABLET | Refills: 1 | Status: SHIPPED | OUTPATIENT
Start: 2020-04-10 | End: 2020-06-03

## 2020-04-10 RX ORDER — ERGOCALCIFEROL 1.25 MG/1
50000 CAPSULE ORAL
Qty: 12 CAPSULE | Refills: 4 | Status: SHIPPED | OUTPATIENT
Start: 2020-04-10 | End: 2020-11-05 | Stop reason: SDUPTHER

## 2020-04-10 RX ORDER — BENZONATATE 200 MG/1
CAPSULE ORAL
COMMUNITY
Start: 2020-04-09 | End: 2020-06-17

## 2020-04-10 NOTE — PATIENT INSTRUCTIONS
Chronic Pancreatitis    Chronic pancreatitis is long-lasting inflammation and scarring of the pancreas. The pancreas is a gland that is located behind the stomach. It makes enzymes that help to digest food. The pancreas also releases hormones called glucagon and insulin, which help regulate blood sugar (glucose). Damage to the pancreas may affect digestion, cause pain in the upper abdomen and back, and cause diabetes. Inflammation can also irritate other organs in the abdomen near the pancreas.  At first, pancreatitis may be sudden (acute). If you have several or prolonged episodes of acute pancreatitis, the condition can turn into chronic pancreatitis.  What are the causes?  The most common cause of this condition is alcohol abuse. Other causes include:  · High (elevated) levels of triglycerides in the blood (hypertriglyceridemia).  · Gallstones or other conditions that can block the tube that drains the pancreas (pancreatic duct).  · Pancreatic cancer.  · Cystic fibrosis.  · Too much calcium in the blood (hypercalcemia), which may be caused by an overactive parathyroid gland (hyperparathyroidism).  · Certain medicines.  · Injury to the pancreas.  · Infection.  · Autoimmune pancreatitis. This is when the body's disease-fighting (immune) system attacks the pancreas.  · Genes that are passed from parent to child (inherited).  In some cases, the cause may not be known.  What increases the risk?  This condition is more likely to develop in:  · Men.  · People who are 35-55 years old.  · People who have a family history of pancreatitis.  · People who smoke tobacco.  · People who drink large amounts of alcohol over a long period of time.  What are the signs or symptoms?  Symptoms of this condition may include:  · Pain in the abdomen or upper back. Pain may get worse after eating.  · Nausea and vomiting.  · Fever.  · Weight loss.  · A change in the color and consistency of bowel movements, such as stools that are oily,  fatty, or liliana-colored.  How is this diagnosed?  This condition is diagnosed based on your symptoms, your medical history, and a physical exam. You may have tests, such as:  · Blood tests.  · Stool samples.  · Biopsy of the pancreas. This is the removal of a small amount of pancreas tissue to be tested in a lab.  · Imaging tests, such as:  ? X-rays.  ? CT scan.  ? MRI.  ? Ultrasound.  How is this treated?  You may need to be treated at a hospital. Treatment may involve:  · Resting the pancreas. You may need to stop eating and drinking for a few days to give your pancreas time to recover. During this time, you will be given IV fluids to keep you hydrated.  · Controlling pain. You may be given pain medicines by mouth (orally) or as injections.  · Improving digestion. You may be given:  ? Medicines to replace your pancreatic enzymes.  ? Vitamin supplements.  ? A specific diet to follow. You may work with a diet and nutrition specialist (dietitian) to make an eating plan.  · Surgery to:  ? Clear the pancreatic ducts of any blockages, such as gallstones.  ? Remove any fluid or damaged tissue from the pancreas.  Other treatments may include:  · Preventing diabetes. Your health care provider may recommend that you:  ? Get regular screening tests for diabetes.  ? Monitor your blood glucose regularly.  · Lifestyle changes, such as stopping alcohol use.  · Steroid medicines, if your condition is caused by your immune system attacking your body's own tissues (autoimmune disease).  Follow these instructions at home:  Eating and drinking         · Do not drink alcohol. If you need help quitting, ask your health care provider.  · Follow a diet as told by your health care provider or dietitian, if this applies. This may include:  ? Limiting how much fat you eat.  ? Eating smaller meals more often.  ? Avoiding caffeine.  · Drink enough fluid to keep your urine pale yellow.  General instructions  · Take over-the-counter and  prescription medicines only as told by your health care provider. These include vitamin supplements.  · Do not drive or use heavy machinery while taking prescription pain medicine.  · If you are taking prescription pain medicine, take actions to prevent or treat constipation. Your health care provider may recommend that you:  ? Take an over-the-counter or prescription medicine for constipation.  ? Eat foods that are high in fiber such as whole grains and beans.  ? Limit foods that are high in fat and processed sugars, such as fried or sweet foods.  · Do not use any products that contain nicotine or tobacco, such as cigarettes and e-cigarettes. If you need help quitting, ask your health care provider.  · If recommended by your health care provider, monitor your blood glucose at home.  · Keep all follow-up visits as told by your health care provider. This is important.  Contact a health care provider if:  · You have pain that does not get better with medicine.  · You have a fever.  · You have sudden weight loss.  Get help right away if:  · Your pain suddenly gets worse.  · You have sudden swelling in your abdomen.  · You start to vomit often.  · You vomit blood.  · You have diarrhea that does not go away.  · You have blood in your stool.  · You become confused or you have trouble thinking clearly.  Summary  · Chronic pancreatitis is long-lasting inflammation and scarring of the pancreas. Damage to the pancreas may affect digestion, cause pain in the upper abdomen and back, and cause diabetes. Inflammation can also irritate other organs in the abdomen near the pancreas.  · Common causes of this condition are alcohol abuse, gallstones, high (elevated) levels of triglycerides, and certain medicines.  · This condition is sometimes treated at a hospital and may involve resting the pancreas, controlling pain, replacing enzymes, and avoiding alcohol.  This information is not intended to replace advice given to you by your  health care provider. Make sure you discuss any questions you have with your health care provider.  Document Released: 01/13/2017 Document Revised: 10/07/2019 Document Reviewed: 08/17/2018  Elsevier Interactive Patient Education © 2020 [employer]

## 2020-04-10 NOTE — PROGRESS NOTES
Subjective   Bladimir Bah is a 40 y.o. female.     History of Present Illness   ER follow up due to abdominal pain on Thursday April 9th  RUQ abdominal pain with nausea and change in appetite. Had CT of abd/pelv that showed signs calcifications of head of pancreas associated with chronic pancreatitis, 1.3 cm liver mass, possible hemangioma. She needs a referral to GI specialist to follow up on these issues.  Pt has been taking Zofran 4 mg but not helping nausea, would like a higher dose, acid reflux not improved on Zantac and Omeprazole.   Was checked for Covid 19 with labs and XR which were negative.   She continues to smoke   She denies alcohol use  She had an appt with GYN yesterday and is supposed to start Doxycycline   Having left ear pain, is being treated for ear infection but not improving, she has an appt with ENT to have her ear checked  Needs a refill on Ibuprofen 800 mg for pain  Needs a refill on regular meds  The following portions of the patient's history were reviewed and updated as appropriate: allergies, current medications, past family history, past medical history, past social history, past surgical history and problem list.    Review of Systems   Constitutional: Positive for appetite change and fatigue. Negative for chills, fever, unexpected weight gain and unexpected weight loss.   HENT: Positive for postnasal drip, rhinorrhea and sore throat.    Eyes: Negative.    Respiratory: Positive for cough and shortness of breath.    Cardiovascular: Negative.    Gastrointestinal: Positive for abdominal pain, nausea and GERD.   Endocrine: Negative for polydipsia, polyphagia and polyuria.   Allergic/Immunologic: Positive for environmental allergies.   Neurological: Negative for dizziness.   Psychiatric/Behavioral: Positive for sleep disturbance and depressed mood. The patient is nervous/anxious.        Objective   Physical Exam   Constitutional: She is oriented to person, place, and time. She appears  well-developed and well-nourished. No distress.   HENT:   Head: Normocephalic.   Right Ear: External ear normal.   Left Ear: External ear normal. An impacted cerumen is present.  Nose: Nose normal.   Mouth/Throat: Oropharynx is clear and moist.   Eyes: Lids are normal.   Neck: Neck supple. No thyromegaly present.   Cardiovascular: Normal rate, regular rhythm and normal heart sounds.   Pulmonary/Chest: Effort normal and breath sounds normal. No respiratory distress. She has no wheezes. She has no rales.   Abdominal: Soft. Normal appearance and bowel sounds are normal. There is no hepatosplenomegaly. There is tenderness in the right upper quadrant and epigastric area. There is no rigidity, no rebound, no guarding and no CVA tenderness. No hernia.   Musculoskeletal: She exhibits no edema.   Neurological: She is alert and oriented to person, place, and time.   Skin: Skin is warm and dry. She is not diaphoretic.   Psychiatric: She has a normal mood and affect. Her behavior is normal. Judgment and thought content normal.   Nursing note and vitals reviewed.        Assessment/Plan   Bladimir was seen today for fu from [facility] er visit and cough & soa.    Diagnoses and all orders for this visit:    Idiopathic chronic pancreatitis (CMS/HCC)  -     ondansetron ODT (ZOFRAN-ODT) 8 MG disintegrating tablet; Place 1 tablet on the tongue Every 8 (Eight) Hours As Needed for Nausea or Vomiting.  -     ibuprofen (ADVIL,MOTRIN) 800 MG tablet; Take 1 tablet by mouth 3 (Three) Times a Day As Needed for Mild Pain  or Moderate Pain .  -     Ambulatory Referral to Gastroenterology    Liver mass  -     Ambulatory Referral to Gastroenterology    Nausea  -     ondansetron ODT (ZOFRAN-ODT) 8 MG disintegrating tablet; Place 1 tablet on the tongue Every 8 (Eight) Hours As Needed for Nausea or Vomiting.  -     Ambulatory Referral to Gastroenterology    Gastroesophageal reflux disease without esophagitis  -     pantoprazole (Protonix) 40 MG EC tablet;  Take 1 tablet by mouth Daily.  -     Ambulatory Referral to Gastroenterology    Fibromyalgia  -     ibuprofen (ADVIL,MOTRIN) 800 MG tablet; Take 1 tablet by mouth 3 (Three) Times a Day As Needed for Mild Pain  or Moderate Pain .    Vitamin D deficiency  -     vitamin D (ERGOCALCIFEROL) 1.25 MG (45087 UT) capsule capsule; Take 1 capsule by mouth Every 7 (Seven) Days.    will increase dose of Zofran to 8 mg  Will place referral to GI specialist pt wants to see Dr Khan to have pancreatitis and liver mass  Will refill Ibuprofen 800 mg take with food and avoid other NSAIDs with this or while taking Prednisone or this can increase risk of stomach ulcer  Will change acid reflux medication to Pantoprazole and told pt to stop Zantac as this has been recalled  Advance diet as tolerated avoid greasy foods   Keep appt with ENT as left ear is blocked with cerumen and this may be cause of ear pain since she has not responded to antibiotics  F/U prn

## 2020-04-11 DIAGNOSIS — M79.7 FIBROMYALGIA: ICD-10-CM

## 2020-04-13 ENCOUNTER — TELEPHONE (OUTPATIENT)
Dept: FAMILY MEDICINE CLINIC | Facility: CLINIC | Age: 40
End: 2020-04-13

## 2020-04-13 RX ORDER — DULOXETIN HYDROCHLORIDE 30 MG/1
CAPSULE, DELAYED RELEASE ORAL
Qty: 30 CAPSULE | Refills: 5 | OUTPATIENT
Start: 2020-04-13

## 2020-04-13 NOTE — TELEPHONE ENCOUNTER
PT CALLED TO REQUEST A DIFFERENT DR OTHER THAN DR RODRIGUEZ.      PT CONTACT  [phone]     PLEASE ADVISE

## 2020-04-13 NOTE — TELEPHONE ENCOUNTER
Patient's sister Cora Beckwith called on behalf of the patient, the patient is requesting a call back from the provider/nurse.   Cora stated that the patient went to the [facility] ER because she was having so much abdominal pain and is being told that there is nothing wrong and they are sending her home. The patient asked that Cora call the office but Cora is unsure exactly what she needs.    Please call patient back and advise @[phone].   If her phone battery is dead she is in ER room number 1.

## 2020-04-13 NOTE — TELEPHONE ENCOUNTER
Nothing we can do to override the ER physician. She can always go to different ER if she is in severe pain. [facility] has also referred her to specialty, so there is nothing else we can do from a primary care standpoint she would need to see specialty. And we can not prescribe any pain medication for her. SHERLY

## 2020-04-20 ENCOUNTER — TELEPHONE (OUTPATIENT)
Dept: FAMILY MEDICINE CLINIC | Facility: CLINIC | Age: 40
End: 2020-04-20

## 2020-04-20 DIAGNOSIS — N76.0 ACUTE VAGINITIS: Primary | ICD-10-CM

## 2020-04-20 NOTE — TELEPHONE ENCOUNTER
PT CALLED ASKING JUAN MENDEZ TO CHECK HER RECORDS FROM THE HOSPITAL AND HER OB GYM. SHE HAD A VIDEO VISIT SCHEDULE  FOR TOMORROW WITH A GASTRO DR.  PT WILL CALL TO MAKE A FOLLOW UP SHAHID ONCE SHE TALK TO THE GASTRO DR.    PT CONTACT [phone]

## 2020-04-21 ENCOUNTER — TELEPHONE (OUTPATIENT)
Dept: FAMILY MEDICINE CLINIC | Facility: CLINIC | Age: 40
End: 2020-04-21

## 2020-04-21 ENCOUNTER — TELEMEDICINE (OUTPATIENT)
Dept: GASTROENTEROLOGY | Facility: CLINIC | Age: 40
End: 2020-04-21

## 2020-04-21 DIAGNOSIS — R19.7 DIARRHEA, UNSPECIFIED TYPE: ICD-10-CM

## 2020-04-21 DIAGNOSIS — K76.9 LIVER LESION: ICD-10-CM

## 2020-04-21 DIAGNOSIS — K58.0 IRRITABLE BOWEL SYNDROME WITH DIARRHEA: ICD-10-CM

## 2020-04-21 DIAGNOSIS — R10.13 EPIGASTRIC PAIN: Primary | ICD-10-CM

## 2020-04-21 DIAGNOSIS — R93.3 ABNORMAL CT SCAN, GASTROINTESTINAL TRACT: ICD-10-CM

## 2020-04-21 DIAGNOSIS — R14.0 BLOATING: ICD-10-CM

## 2020-04-21 DIAGNOSIS — R10.11 RIGHT UPPER QUADRANT ABDOMINAL PAIN: ICD-10-CM

## 2020-04-21 PROCEDURE — 99204 OFFICE O/P NEW MOD 45 MIN: CPT | Performed by: INTERNAL MEDICINE

## 2020-04-21 RX ORDER — FLUCONAZOLE 150 MG/1
TABLET ORAL
Qty: 2 TABLET | Refills: 0 | Status: SHIPPED | OUTPATIENT
Start: 2020-04-21 | End: 2020-05-26 | Stop reason: SDUPTHER

## 2020-04-21 NOTE — TELEPHONE ENCOUNTER
unfortunately our [facility] does not accept orders from outside providers even if they are in the [facility] network. Please provide patient Dr. Guillen's contact information she will likely need to  stool kit from their office or other diagnostic lab that accepts outside provider orders. SHERLY

## 2020-04-21 NOTE — TELEPHONE ENCOUNTER
[facility] but I did not receive any records to review from GYN visit. She can tell us who she saw and we can try to obtain or she can call to ask them to fax them to me.     I do not understand the message. Is there something she is needing? nara

## 2020-04-21 NOTE — PROGRESS NOTES
PCP: Savage Johnson PA    No chief complaint on file.  Problems with abdominal pain, bloating and diarrhea.    History of Present Illness:   HPI  This was a video visit.  The patient consented to a video visit.  Ms. Bah is a 40-year-old with a history of anxiety and GERD.  She has been followed over the years by Dr. Khan in [place], [place].  The patient had an EGD and colonoscopy performed by Dr. Khan in 2019 and all biopsies were negative for colitis, celiac sprue and H. pylori.  She recently went to the emergency room at [facility] and had a CT scan with a diagnosis of pancreatitis.  There is no history of significant alcohol use.  She is adopted but the information that is available to her is not consistent with any pancreas issues in her family.  The patient has a history of some discomfort in the right upper abdomen that is sharp and stabbing.  There is no radiation of the pain.  The discomfort can last for several hours. Her gallbladder was removed years ago.  She admits to some urgency to have a bowel movement after meals.  The pain in the right upper abdomen is not always associated with eating.  Ms. Bah may have several bowel movements daily without nocturnal diarrhea.  There is no history of beverly blood in the stool.  She denies any unexplained weight loss and in fact has gained weight.  Ms. Bah denies any beverly residue in the commode after flushing.  There is no history of night sweats, fever or chills.  The patient has been treated for colitis in the past with antibiotics.  She does smoke cigarettes on a daily basis.  The patient denies any narcotic use.  She has been on pain medication in the past due to multiple hip surgeries.  The patient was evaluated in the past by pain management team.  No known history of gastrointestinal cancer.  No recent travel outside the [place].  The patient denies any new medications.    She has no known history of elevated  "liver test.  The patient denies any family history of liver disease.  She was found to have a lesion on CT scan.  Patient denies any jaundice.  There is no history of pruritus.  She denies any pain in the right upper abdomen with inspiration.  Past Medical History:   Diagnosis Date   • Acute nonintractable headache     nonspcecified headache type    • Acute otitis externa of left ear    • Acute otitis media    • Allergic    • Anxiety    • Back pain    • Blurry vision    • Contact dermatitis due to poison ivy    • Depression    • Diarrhea    • Dizziness    • Edema    • Factor V Leiden mutation (CMS/HCC)     \"snf\"   • Fatigue    • Flatulence    • Fractures, compound     h/o    • Gastritis    • Gastroenteritis    • GERD (gastroesophageal reflux disease)    • H/O blood clots    • Hair loss    • History of cardiac disorder    • History of kidney infection    • History of seizure disorder    • History of seizures    • Hypertension    • Joint pain, hip    • Low serum vitamin D    • Miscarriage    • Nausea    • Otalgia of left ear    • Rash    • Renal calculi     personal h/o    • Speech complaints    • Vitamin D deficiency        Past Surgical History:   Procedure Laterality Date   • CHOLECYSTECTOMY     • HIP SURGERY      multiple since 5th grade x 12 replacements as well    • HYSTERECTOMY      CHRISTIANO    • TUBAL ABDOMINAL LIGATION           Current Outpatient Medications:   •  benzonatate (TESSALON) 200 MG capsule, , Disp: , Rfl:   •  brompheniramine-pseudoephedrine-DM 30-2-10 MG/5ML syrup, Take 5 mL by mouth 4 (Four) Times a Day As Needed for Congestion or Cough., Disp: 150 mL, Rfl: 0  •  cetirizine (zyrTEC) 10 MG tablet, , Disp: , Rfl:   •  cyclobenzaprine (FLEXERIL) 10 MG tablet, Take 1 tablet by mouth 3 (Three) Times a Day As Needed for Muscle Spasms., Disp: 90 tablet, Rfl: 5  •  dicyclomine (BENTYL) 20 MG tablet, Take 1 tablet by mouth 4 (Four) Times a Day., Disp: 120 tablet, Rfl: 5  •  doxycycline (MONODOX) 100 MG " capsule, , Disp: , Rfl:   •  DULoxetine (CYMBALTA) 60 MG capsule, Take 1 capsule by mouth Daily., Disp: 30 capsule, Rfl: 5  •  fluconazole (Diflucan) 150 MG tablet, Take one tablet and repeat in 3 days if symptoms persist, Disp: 2 tablet, Rfl: 0  •  fluticasone (FLONASE) 50 MCG/ACT nasal spray, 2 sprays into the nostril(s) as directed by provider Daily., Disp: 16 g, Rfl: 5  •  hydrochlorothiazide (HYDRODIURIL) 25 MG tablet, Take 1 tablet by mouth Daily., Disp: 30 tablet, Rfl: 5  •  hydrOXYzine (ATARAX) 50 MG tablet, Take 1 tablet by mouth 3 (Three) Times a Day., Disp: 90 tablet, Rfl: 5  •  ibuprofen (ADVIL,MOTRIN) 800 MG tablet, Take 1 tablet by mouth 3 (Three) Times a Day As Needed for Mild Pain  or Moderate Pain ., Disp: 90 tablet, Rfl: 1  •  Multiple Vitamin (THERA-TABS) tablet, Take 1 tablet by mouth Every Morning., Disp: , Rfl: 0  •  mupirocin (Bactroban) 2 % ointment, Apply  topically to the appropriate area as directed 3 (Three) Times a Day., Disp: 22 g, Rfl: 0  •  ondansetron ODT (ZOFRAN-ODT) 8 MG disintegrating tablet, Place 1 tablet on the tongue Every 8 (Eight) Hours As Needed for Nausea or Vomiting., Disp: 30 tablet, Rfl: 1  •  pantoprazole (Protonix) 40 MG EC tablet, Take 1 tablet by mouth Daily., Disp: 90 tablet, Rfl: 1  •  predniSONE (DELTASONE) 10 MG tablet, , Disp: , Rfl:   •  PREMARIN 0.625 MG/GM vaginal cream, , Disp: , Rfl:   •  promethazine (PHENERGAN) 25 MG tablet, Take 1 tablet by mouth Every 6 (Six) Hours As Needed for Nausea or Vomiting., Disp: 30 tablet, Rfl: 2  •  traZODone (DESYREL) 50 MG tablet, Take 1-2 tablets by mouth Every Night., Disp: 60 tablet, Rfl: 2  •  valACYclovir (VALTREX) 1000 MG tablet, Take 1 tablet by mouth 3 (Three) Times a Day., Disp: 21 tablet, Rfl: 0  •  vitamin D (ERGOCALCIFEROL) 1.25 MG (31974 UT) capsule capsule, Take 1 capsule by mouth Every 7 (Seven) Days., Disp: 12 capsule, Rfl: 4    No Known Allergies    Family History   Problem Relation Age of Onset   •  Arthritis Mother    • Depression Mother    • Diabetes Mother    • Heart disease Mother    • Hyperlipidemia Mother    • Hypertension Mother    • Stroke Mother    • Arthritis Father    • Depression Father    • Hypertension Father    • Asthma Daughter    • Depression Daughter    • Asthma Son    • Depression Son    • Arthritis Maternal Grandmother    • Cancer Maternal Grandmother    • Depression Maternal Grandmother    • Diabetes Maternal Grandmother    • Depression Maternal Grandfather    • Arthritis Paternal Grandmother    • Depression Paternal Grandmother    • Depression Paternal Grandfather    • Arthritis Other    • Mental illness Other        Social History     Socioeconomic History   • Marital status: Single     Spouse name: Not on file   • Number of children: Not on file   • Years of education: Not on file   • Highest education level: Not on file   Tobacco Use   • Smoking status: Current Every Day Smoker   • Smokeless tobacco: Never Used   Substance and Sexual Activity   • Alcohol use: Yes   • Drug use: No       Review of Systems   Constitutional: Negative for appetite change, fatigue, fever and unexpected weight change.   HENT: Negative for dental problem, mouth sores, postnasal drip, sneezing, trouble swallowing and voice change.    Eyes: Negative for pain, redness and itching.   Respiratory: Negative for cough, shortness of breath and wheezing.    Cardiovascular: Negative for chest pain, palpitations and leg swelling.   Gastrointestinal: Positive for abdominal pain, diarrhea and nausea. Negative for abdominal distention, anal bleeding, blood in stool, constipation, rectal pain and vomiting.   Endocrine: Negative for cold intolerance, heat intolerance, polydipsia and polyuria.   Genitourinary: Negative for dysuria, enuresis, flank pain, hematuria and urgency.   Musculoskeletal: Negative for arthralgias, back pain, joint swelling and myalgias.   Skin: Negative for color change, pallor and rash.    Allergic/Immunologic: Negative for environmental allergies, food allergies and immunocompromised state.   Neurological: Negative for dizziness, tremors, seizures, facial asymmetry, numbness and headaches.   Psychiatric/Behavioral: Negative for behavioral problems, dysphoric mood, hallucinations and self-injury.       There were no vitals filed for this visit.    Physical Exam    Diagnoses and all orders for this visit:    Epigastric pain    Right upper quadrant abdominal pain    Bloating    Diarrhea, unspecified type    Irritable bowel syndrome with diarrhea    Abnormal CT scan, gastrointestinal tract    Liver lesion    The patient had a CT scan earlier this month suggestive of pancreatic calcifications in the region of the head.  However, the most recent CT scan with IV contrast did not demonstrate any reported calcifications in the pancreatic head region.  She does have a risk for pancreas disease with chronic tobacco use.  The history of diarrhea is more consistent with irritable bowel syndrome but cannot completely exclude exocrine pancreas insufficiency.    The lesion on CT scan is consistent per report with a hemangioma.  However, will need further evaluation to rule out other parenchymal hepatic process.  The liver panel tests that reviewed were normal.    The patient does admit to significant anxiety and has panic attacks.  There is some functional component to the gastrointestinal symptoms but further evaluation is warranted.      Plan: Will check fecal elastase.           Will schedule MRI of the liver and MRCP for duct evaluation of the pancreas.           Further recommendations pending above studies.  This was a video visit that was over 30 minutes and review of multiple data sets including CT scans and lab studies.

## 2020-04-21 NOTE — TELEPHONE ENCOUNTER
Spoke with Ms. Handleyrett, she said that she will call her OBGYN and get the records from them faxed over to us to just review. She also wanted to know if she could get a medication for a yeast infection called in as her two antibiotics that she was given is causing her to have one. Please advise.     [facility] in [place]

## 2020-04-21 NOTE — TELEPHONE ENCOUNTER
PT CALLED STATING SHE HAD A VIRTUAL VISIT WITH GI DOCTOR    PT WAS TOLD TO GET A BOWEL KIT AT OUR OFFICE AFTER VISIT    PT STATED SHE CAME TO THE OFFICE AND WAS TOLD THAT WE DO NOT OFFER THAT    PT IS REQUESTING HELP WITH GETTING A HOLD OF  DR. ROWE     PT STATED SHE WAS GIVEN A NUMBER BUT WAS UNABLE TO GET A  HOLD OF HIM THROUGH THAT     PLEASE ADVISE PT AT  [phone]

## 2020-04-22 ENCOUNTER — TELEPHONE (OUTPATIENT)
Dept: GASTROENTEROLOGY | Facility: CLINIC | Age: 40
End: 2020-04-22

## 2020-04-22 NOTE — TELEPHONE ENCOUNTER
I called patient back. I explained to her that she doesn't need a bowel kit for Pancreatic elastase kit. Patient stated that her family doctor doesn't do this type of test. I advised her to come to [facility] lab to get test done. She can get it done on the same day as her MRI. Patient voiced understanding.

## 2020-04-24 ENCOUNTER — HOSPITAL ENCOUNTER (OUTPATIENT)
Dept: MRI IMAGING | Facility: HOSPITAL | Age: 40
Discharge: HOME | End: 2020-04-24
Admitting: INTERNAL MEDICINE

## 2020-04-24 DIAGNOSIS — R93.3 ABNORMAL CT SCAN, GASTROINTESTINAL TRACT: ICD-10-CM

## 2020-04-24 DIAGNOSIS — R10.13 EPIGASTRIC PAIN: ICD-10-CM

## 2020-04-24 DIAGNOSIS — R10.11 RIGHT UPPER QUADRANT ABDOMINAL PAIN: ICD-10-CM

## 2020-04-24 DIAGNOSIS — K76.9 LIVER LESION: ICD-10-CM

## 2020-04-24 PROCEDURE — A9577 INJ MULTIHANCE: HCPCS | Performed by: INTERNAL MEDICINE

## 2020-04-24 PROCEDURE — 0 GADOBENATE DIMEGLUMINE 529 MG/ML SOLUTION: Performed by: INTERNAL MEDICINE

## 2020-04-24 PROCEDURE — 74183 MRI ABD W/O CNTR FLWD CNTR: CPT

## 2020-04-24 RX ADMIN — GADOBENATE DIMEGLUMINE 19 ML: 529 INJECTION, SOLUTION INTRAVENOUS at 14:40

## 2020-04-28 ENCOUNTER — TELEPHONE (OUTPATIENT)
Dept: FAMILY MEDICINE CLINIC | Facility: CLINIC | Age: 40
End: 2020-04-28

## 2020-04-28 ENCOUNTER — OFFICE VISIT (OUTPATIENT)
Dept: FAMILY MEDICINE CLINIC | Facility: CLINIC | Age: 40
End: 2020-04-28

## 2020-04-28 ENCOUNTER — TELEPHONE (OUTPATIENT)
Dept: GASTROENTEROLOGY | Facility: CLINIC | Age: 40
End: 2020-04-28

## 2020-04-28 VITALS
HEART RATE: 96 BPM | TEMPERATURE: 97.4 F | DIASTOLIC BLOOD PRESSURE: 78 MMHG | OXYGEN SATURATION: 98 % | SYSTOLIC BLOOD PRESSURE: 100 MMHG | BODY MASS INDEX: 32.62 KG/M2 | HEIGHT: 66 IN | WEIGHT: 203 LBS

## 2020-04-28 DIAGNOSIS — K52.9 COLITIS, ACUTE: ICD-10-CM

## 2020-04-28 DIAGNOSIS — F41.9 ANXIETY: ICD-10-CM

## 2020-04-28 DIAGNOSIS — R11.0 NAUSEA: ICD-10-CM

## 2020-04-28 DIAGNOSIS — R10.11 RIGHT UPPER QUADRANT ABDOMINAL PAIN: Primary | ICD-10-CM

## 2020-04-28 DIAGNOSIS — K86.1 OTHER CHRONIC PANCREATITIS (HCC): Primary | ICD-10-CM

## 2020-04-28 LAB
BILIRUB BLD-MCNC: NEGATIVE MG/DL
CLARITY, POC: CLEAR
COLOR UR: YELLOW
GLUCOSE UR STRIP-MCNC: NEGATIVE MG/DL
KETONES UR QL: NEGATIVE
LEUKOCYTE EST, POC: NEGATIVE
NITRITE UR-MCNC: NEGATIVE MG/ML
PH UR: 6 [PH] (ref 5–8)
PROT UR STRIP-MCNC: NEGATIVE MG/DL
RBC # UR STRIP: NEGATIVE /UL
SP GR UR: 1.02 (ref 1–1.03)
UROBILINOGEN UR QL: NORMAL

## 2020-04-28 PROCEDURE — 99214 OFFICE O/P EST MOD 30 MIN: CPT | Performed by: NURSE PRACTITIONER

## 2020-04-28 RX ORDER — HYDROCODONE BITARTRATE AND ACETAMINOPHEN 5; 325 MG/1; MG/1
TABLET ORAL
COMMUNITY
Start: 2020-04-20 | End: 2020-06-17

## 2020-04-28 RX ORDER — ONDANSETRON 4 MG/1
TABLET, ORALLY DISINTEGRATING ORAL
COMMUNITY
Start: 2020-04-13 | End: 2020-04-28 | Stop reason: SDUPTHER

## 2020-04-28 RX ORDER — CIPROFLOXACIN 500 MG/1
TABLET, FILM COATED ORAL
COMMUNITY
Start: 2020-04-20 | End: 2020-06-17

## 2020-04-28 RX ORDER — METRONIDAZOLE 500 MG/1
TABLET ORAL
COMMUNITY
Start: 2020-04-20 | End: 2020-06-17

## 2020-04-28 RX ORDER — DICYCLOMINE HYDROCHLORIDE 10 MG/1
CAPSULE ORAL
COMMUNITY
Start: 2020-04-13 | End: 2020-04-28 | Stop reason: SDUPTHER

## 2020-04-28 RX ORDER — PROMETHAZINE HYDROCHLORIDE 25 MG/1
SUPPOSITORY RECTAL AS NEEDED
COMMUNITY
Start: 2020-04-13 | End: 2020-09-21

## 2020-04-28 RX ORDER — ONDANSETRON 4 MG/1
TABLET, FILM COATED ORAL
COMMUNITY
Start: 2020-04-20 | End: 2020-04-28 | Stop reason: SDUPTHER

## 2020-04-28 RX ORDER — IMIPRAMINE HCL 25 MG
TABLET ORAL
Qty: 90 TABLET | Refills: 1 | Status: SHIPPED | OUTPATIENT
Start: 2020-04-28 | End: 2020-09-21

## 2020-04-28 NOTE — TELEPHONE ENCOUNTER
I called and spoke with Ms. Handleyrett regarding the MRCP.  There was evidence of pancreas divisum.  She has not been able to give a sample for the fecal elastase.  I did state that when she can will like to obtain that stool study.  However, I will prescribe her some pancreas enzyme replacement.

## 2020-04-28 NOTE — TELEPHONE ENCOUNTER
PT CALLED STATED THAT THE gastroenterologist CANCELLED HER APPT.    PLEASE ADVISE.  CALL BACK:[phone]

## 2020-04-28 NOTE — TELEPHONE ENCOUNTER
Contact their office to discuss this, I do not know why the appointment has been cancelled.     Pt will need to go to ER if abdominal pain and nausea and vomiting persist and she is unable to eat or drink. nara

## 2020-04-28 NOTE — TELEPHONE ENCOUNTER
Called and informed patient of Santa's message. She verbalized understanding and had no further questions. She stated that she would call GI and find out why her appointment was cancelled.

## 2020-04-28 NOTE — PROGRESS NOTES
Subjective   Nia Bah is a 40 y.o. female.     History of Present Illness   Abdominal pain & N/V for the past month (but also off and on for years)  Having abd pain RUQ, bloating, trouble eating, very nauseated with vomiting and having diarrhea   Stopped taking all her oral meds 2 days ago because they seemed to be making her symptoms worse    Says she keeps going to ER at [facility] (4/19 was the last time) and they give her is IVF  Her most recent visit she had labs and imaging, urine normal,  WBC ct was mildly elevated and CT scan showed mild wall thickening consistent with colitis. She was started on Flagyl and Cipro for Colitis and given some Percocet for pain.  She has some pain and tenderness in the right vein above the AC joint   She is feeling very frustrated. She did a video visit with GI specialist last Wednesday. He ordered a MRI of abdomen MRCP that she had on Friday. She has not heard back about the results yet.  Just wants to feel better, she wants to eat but every time she eats or drinks she throws up  She does not know if she should go back to ER or see Dr Guillen again  She has constant abdominal pain in RUQ, nausea and vomiting.   Having some difficulty urinating at times, not sure if it is due to dehydration. No blood in urine, no fever or chills. No dysuria or blood or odor to urine.  Feeling very anxious and tearful; stopped her Duloxetine 2 days ago   She really needs something for the anxiety    The following portions of the patient's history were reviewed and updated as appropriate: allergies, current medications, past family history, past medical history, past social history, past surgical history and problem list.    Review of Systems   Constitutional: Positive for appetite change and unexpected weight loss.   HENT: Negative.    Eyes: Negative.    Respiratory: Negative.    Cardiovascular: Negative.    Gastrointestinal: Positive for abdominal distention, abdominal pain, diarrhea,  nausea and vomiting.   Genitourinary: Positive for difficulty urinating. Negative for dysuria, flank pain and hematuria.       Objective   Physical Exam   Constitutional: She is oriented to person, place, and time. She appears well-developed and well-nourished. She appears distressed.   Neck: Neck supple.   Cardiovascular: Normal rate, regular rhythm and normal heart sounds.   Pulmonary/Chest: Effort normal and breath sounds normal.   Abdominal: Soft. There is tenderness. There is guarding.   Neurological: She is alert and oriented to person, place, and time.   Skin: Skin is warm and dry. She is not diaphoretic.   Psychiatric: Her speech is normal and behavior is normal. Judgment and thought content normal. Her mood appears anxious. Her affect is angry. Cognition and memory are normal. She exhibits a depressed mood.   Nursing note and vitals reviewed.        Assessment/Plan   Nia was seen today for [facility] er-diarrhea, vomiting.    Diagnoses and all orders for this visit:    Right upper quadrant abdominal pain  -     Ambulatory Referral to Gastroenterology  -     POCT urinalysis dipstick, automated    Nausea  -     Ambulatory Referral to Gastroenterology    Anxiety  -     imipramine (TOFRANIL) 25 MG tablet; For anxiety    Colitis, acute    UA normal pt informed    recommend that pt go to ER for fluids and to have electrolytes checked today and for pain management but pt declines at this time.   she will attempt to stop N/V at home with anti-emetics (she has all types ODT, suppository, tablets) and start drinking a small amt of fluids every half hour.   We have gotten her an office visit with Dr Guillen tomorrow in the office.  Advised pt that if possible she needs to take her regular medications not stopped them suddently.  She agrees to go to ER if needed and to keep appt with Dr Guillen tomorrow

## 2020-04-29 NOTE — TELEPHONE ENCOUNTER
I called patient back. Patient has a Pancreatitis diagnosis per Dr Guillen's notes. Patient voiced understanding.

## 2020-04-30 ENCOUNTER — TELEPHONE (OUTPATIENT)
Dept: FAMILY MEDICINE CLINIC | Facility: CLINIC | Age: 40
End: 2020-04-30

## 2020-04-30 NOTE — TELEPHONE ENCOUNTER
Please inform Ms Bah that I was willing to call the day she was in the office because that is what we do as a courtesy to the ER notifying them that a patient we have seen is coming in. A phone call to them would not influence how you are taken in or treated. You still have to be evaluated by the staff working today. nara

## 2020-04-30 NOTE — TELEPHONE ENCOUNTER
"Called pt and she is wanting you to call [facility] ER. I informed her you could not tell them how to treat her, she would have to be evaluated. She said, \"you were going to call them the other day, why cant you now?\" She say's, she is hurting and cannot sit over there all day.   " abdomen

## 2020-04-30 NOTE — TELEPHONE ENCOUNTER
Patient stated that she is going to the ER and would like if we called them and tell them to give her an IV of antibiotics. Please advise

## 2020-04-30 NOTE — TELEPHONE ENCOUNTER
Left detailed msg informing pt what Santa stated and they she does need to present to the ER if she having that much pain.

## 2020-05-05 ENCOUNTER — TELEPHONE (OUTPATIENT)
Dept: GASTROENTEROLOGY | Facility: CLINIC | Age: 40
End: 2020-05-05

## 2020-05-05 NOTE — TELEPHONE ENCOUNTER
I called patient and gave her Dr Guillen's recommendation. Transferred call to [facility] to schedule 3 month follow up video visit per Dr Guillen.

## 2020-05-05 NOTE — TELEPHONE ENCOUNTER
Dr Guillen,  Patient called and left message on MA line stating that the Creon is working. Do she still need to turn in stool sample? Do she need to schedule a follow up? Please advise. Thanks

## 2020-05-12 ENCOUNTER — LAB (OUTPATIENT)
Dept: LAB | Facility: HOSPITAL | Age: 40
End: 2020-05-12

## 2020-05-12 DIAGNOSIS — R10.13 EPIGASTRIC PAIN: ICD-10-CM

## 2020-05-12 DIAGNOSIS — R93.3 ABNORMAL CT SCAN, GASTROINTESTINAL TRACT: ICD-10-CM

## 2020-05-12 DIAGNOSIS — R14.0 BLOATING: ICD-10-CM

## 2020-05-12 DIAGNOSIS — R19.7 DIARRHEA, UNSPECIFIED TYPE: ICD-10-CM

## 2020-05-12 DIAGNOSIS — R10.11 RIGHT UPPER QUADRANT ABDOMINAL PAIN: ICD-10-CM

## 2020-05-12 PROCEDURE — 82656 EL-1 FECAL QUAL/SEMIQ: CPT

## 2020-05-13 DIAGNOSIS — F11.93 OPIATE WITHDRAWAL (HCC): ICD-10-CM

## 2020-05-13 RX ORDER — TRAZODONE HYDROCHLORIDE 50 MG/1
50-100 TABLET ORAL NIGHTLY
Qty: 60 TABLET | Refills: 2 | Status: SHIPPED | OUTPATIENT
Start: 2020-05-13 | End: 2020-08-06

## 2020-05-15 ENCOUNTER — TELEPHONE (OUTPATIENT)
Dept: FAMILY MEDICINE CLINIC | Facility: CLINIC | Age: 40
End: 2020-05-15

## 2020-05-15 ENCOUNTER — TELEPHONE (OUTPATIENT)
Dept: GASTROENTEROLOGY | Facility: CLINIC | Age: 40
End: 2020-05-15

## 2020-05-15 LAB — ELASTASE PANC STL-MCNT: >500 UG ELAST./G

## 2020-05-15 NOTE — TELEPHONE ENCOUNTER
Informed pt she say's, she does all of this and is still sick. I informed her she needs to call Gastro and tell them this and get further recommendations from them.

## 2020-05-15 NOTE — TELEPHONE ENCOUNTER
Dr. Guillen  Pt called today and states the trazodone is not working for her, states it helps very little for her abdominal pain. States she has severe cramping, diarrhea and vomiting; she is very upset and states she has been to the ER multiple times without any relief. Pt states this is effecting her quality of life. No bleeding with having bowel movement. Denies any fever. Pt states her appetite is poor because she immediately has to vomit, however, she states she stays hungry all the time because she can keep anything down. She is fatigued. The vomit is more foam like. Please Advise.  Thank you,  Kenyatta

## 2020-05-15 NOTE — TELEPHONE ENCOUNTER
Patient called and stated that she is still having a lot of problems.  The Gastro doctor is not helping, he told her to make an appointment in 3 months.  Patient stated that the gastro doc told her to take enzymes that are not helping.  Patient stated that she has been up all night with acid reflux and vomiting.  Patient would like to know what to do because she is miserable.  Patient stated that she is ok if she does not eat but as soon as she heats, she becomes sick. Patient sounded really frustrated because she's been to the ER 7 times.  The ER pumps her with fluids and sends her home.      Pt callback: [phone]    Please advise.

## 2020-05-15 NOTE — TELEPHONE ENCOUNTER
I am not sure what else to do. This will be the GI specialist area address.    It looks like the pancreatic enzymes should be taken at the time you are eating a meal so they can mix together with the food. They help the food to be able to be digested. If you do not take them with every meal your symptoms will continue and may become worse. You will not absorb certain important nutrients from your food.     Avoid fried foods like Lebanese fried or fast food burgers, organ meat, potato chips, full fat dairy and mayonnaise (high fat foods) are some of the food that should be avoided they might trigger a flare-up.    Lean protein like chicken or turkey breast, egg whites, tuna packed in water are foods you may tolerate better.     If the Creon or Pancreatic enzymes are making you more sick you need to let GI doctor know.

## 2020-05-20 DIAGNOSIS — K86.1 IDIOPATHIC CHRONIC PANCREATITIS (HCC): ICD-10-CM

## 2020-05-20 DIAGNOSIS — R11.0 NAUSEA: ICD-10-CM

## 2020-05-20 RX ORDER — ONDANSETRON 8 MG/1
8 TABLET, ORALLY DISINTEGRATING ORAL EVERY 8 HOURS PRN
Qty: 30 TABLET | Refills: 0 | Status: SHIPPED | OUTPATIENT
Start: 2020-05-20 | End: 2020-06-02 | Stop reason: ALTCHOICE

## 2020-05-26 ENCOUNTER — TELEPHONE (OUTPATIENT)
Dept: FAMILY MEDICINE CLINIC | Facility: CLINIC | Age: 40
End: 2020-05-26

## 2020-05-26 ENCOUNTER — TELEPHONE (OUTPATIENT)
Dept: GASTROENTEROLOGY | Facility: CLINIC | Age: 40
End: 2020-05-26

## 2020-05-26 DIAGNOSIS — N76.0 ACUTE VAGINITIS: ICD-10-CM

## 2020-05-26 RX ORDER — FLUCONAZOLE 150 MG/1
TABLET ORAL
Qty: 2 TABLET | Refills: 0 | Status: SHIPPED | OUTPATIENT
Start: 2020-05-26 | End: 2020-06-17

## 2020-05-26 NOTE — TELEPHONE ENCOUNTER
PT CALLED AND IS REQUESTING DIFLUCAN BE CALLED IN FOR HER    PLEASE ADVISE AT: [phone]     [facility]/pharmacy #[id number] - [place], [place] - [address] AT [facility] [address] - [phone]  - [phone] FX     PT IS REQUESTING THAT JUAN MENDEZ BE HER PCP

## 2020-05-26 NOTE — TELEPHONE ENCOUNTER
----- Message from Taiwo Guillen MD sent at 5/18/2020  4:06 PM EDT -----  Let Ms. Bah know the fecal elastase was normal.  Continue with the Creon.  Thank you,  INDIRA

## 2020-05-26 NOTE — TELEPHONE ENCOUNTER
Called and spoke to patient, informed her of Savage's message. She verbalized understanding and had no further questions.

## 2020-05-26 NOTE — TELEPHONE ENCOUNTER
Rx sent to pharmacy. PCP changed per patient's request she may need to have changed with medicaid card. SHERLY

## 2020-05-26 NOTE — TELEPHONE ENCOUNTER
Called and spoke to patient, she states that she has cottage cheese like discharge, little itching. She states that it started yesterday and she hasn't used anything OTC to help with symptoms. She would like a prescription sent to [facility] in [place].

## 2020-05-27 ENCOUNTER — TELEPHONE (OUTPATIENT)
Dept: FAMILY MEDICINE CLINIC | Facility: CLINIC | Age: 40
End: 2020-05-27

## 2020-05-27 DIAGNOSIS — Z91.09 ENVIRONMENTAL ALLERGIES: Primary | ICD-10-CM

## 2020-05-27 DIAGNOSIS — J30.1 SEASONAL ALLERGIC RHINITIS DUE TO POLLEN: ICD-10-CM

## 2020-05-27 DIAGNOSIS — H57.9 ITCHY EYES: ICD-10-CM

## 2020-05-27 RX ORDER — KETOTIFEN FUMARATE 0.25 MG/ML
1 SOLUTION/ DROPS OPHTHALMIC 2 TIMES DAILY
Qty: 10 ML | Refills: 1 | Status: SHIPPED | OUTPATIENT
Start: 2020-05-27 | End: 2020-09-21

## 2020-05-27 RX ORDER — LEVOCETIRIZINE DIHYDROCHLORIDE 5 MG/1
5 TABLET, FILM COATED ORAL EVERY EVENING
Qty: 30 TABLET | Refills: 5 | Status: SHIPPED | OUTPATIENT
Start: 2020-05-27 | End: 2020-12-17

## 2020-05-27 NOTE — TELEPHONE ENCOUNTER
PATIENT REQUESTING A  MEDICATION FOR SYMPTOMS ASSOCIATES WITH  ALLERGIES SUCH AS DRY ITCHY EYES AND SHE SAYS SHE HAS A RASH AROUND HER EYES BECAUSE SHE IS ALLERGIC TO EVERYTHING OUTSIDE, SNEEZING, AND RUNNY NOSE. PATIENT IS REQUESTING PRESCRIPTION EYE DROPS AND ALLERGY MEDICATION.  INSTEAD OF THE CETRIZINE, SHE IS REQUESTING  Levocetirizine Xyzal FOR THE ALLERGY MEDICATION AND SOME EYE DROPS.      PHARMACY VERIFIED AS   [facility]/pharmacy #[id number] - [place], [place] - [address] [place] [address] AT [facility] [address] - [phone]  - [phone] FX     ANY QUESTIONS OR CONCERNS, PLEASE CALL PATIENT AT:  [phone]

## 2020-05-27 NOTE — TELEPHONE ENCOUNTER
Called and spoke to patient, informed her that medications have been sent to the pharmacy. She verbalized understanding and had no further questions.

## 2020-06-02 ENCOUNTER — TELEPHONE (OUTPATIENT)
Dept: FAMILY MEDICINE CLINIC | Facility: CLINIC | Age: 40
End: 2020-06-02

## 2020-06-02 ENCOUNTER — OFFICE VISIT (OUTPATIENT)
Dept: FAMILY MEDICINE CLINIC | Facility: CLINIC | Age: 40
End: 2020-06-02

## 2020-06-02 DIAGNOSIS — R11.2 NAUSEA AND VOMITING IN ADULT: ICD-10-CM

## 2020-06-02 DIAGNOSIS — I10 ESSENTIAL HYPERTENSION: Primary | ICD-10-CM

## 2020-06-02 DIAGNOSIS — Z82.49 FAMILY HISTORY OF EARLY CAD: ICD-10-CM

## 2020-06-02 PROCEDURE — 99213 OFFICE O/P EST LOW 20 MIN: CPT | Performed by: NURSE PRACTITIONER

## 2020-06-02 RX ORDER — ONDANSETRON HYDROCHLORIDE 8 MG/1
8 TABLET, FILM COATED ORAL EVERY 8 HOURS PRN
Qty: 30 TABLET | Refills: 0 | Status: SHIPPED | OUTPATIENT
Start: 2020-06-02 | End: 2020-07-08 | Stop reason: SDUPTHER

## 2020-06-02 RX ORDER — AMLODIPINE BESYLATE AND BENAZEPRIL HYDROCHLORIDE 5; 10 MG/1; MG/1
1 CAPSULE ORAL DAILY
Qty: 90 CAPSULE | Refills: 1 | Status: SHIPPED | OUTPATIENT
Start: 2020-06-02 | End: 2020-09-21

## 2020-06-02 NOTE — TELEPHONE ENCOUNTER
PATIENT CALLED AND STATED SINCE SHE HAS BEEN HAVING PROBLEMS WITH HER STOMACH HER BP HAS BEEN HIGH 139/100 PULSE 107   LAST NIGHT 180/130.    PATIENT IS WANTING TO KNOW WHAT COULD BE DONE ABOUT HER BP BEING HIGH.    PATIENT CALL BACK [phone]      PLEASE CALL AND ADVISE

## 2020-06-02 NOTE — PROGRESS NOTES
Subjective   Nia Bah is a 40 y.o. female.     History of Present Illness   Telehealth visit phone call lasted 12 minutes  You have chosen to receive care through a telehealth visit.  Do you consent to use a video/audio connection for your medical care today? Yes    HTN not controlled  BP very high but never had an issue with BP in the past used to be very low  Checking at home readings of /120, 159/107; 180/110  Also having some chest pain, shortness of breath and swelling. No blurred vision, no facial swelling, no palpitations  Having a lot of pain due to her stomach, under a lot of stress. No sleeping well, up vomiting at times in the night  + family history of heart problems in Mother had first MI prior to age 50, has a pacemaker  Nia wants to see a cardiologist      Still having a lot of stomach issues, nausea and vomiting  Needs a refill on Zofran which helps but the ODT makes her feel sick due to the taste    The following portions of the patient's history were reviewed and updated as appropriate: allergies, current medications, past family history, past medical history, past social history, past surgical history and problem list.    Review of Systems   Constitutional: Positive for appetite change and fatigue.   HENT: Negative.    Eyes: Negative.    Respiratory: Positive for shortness of breath. Negative for cough and wheezing.    Cardiovascular: Positive for chest pain and leg swelling. Negative for palpitations.   Gastrointestinal: Positive for abdominal pain, nausea and vomiting.   Neurological: Negative.    Psychiatric/Behavioral: Positive for sleep disturbance (due to nausea and vomiting).       Objective   Physical Exam none due to telephone visit      Assessment/Plan   Diagnoses and all orders for this visit:    Essential hypertension  -     amLODIPine-benazepril (Lotrel) 5-10 MG per capsule; Take 1 capsule by mouth Daily.  -     Ambulatory Referral to Cardiology    Nausea and  vomiting in adult  -     ondansetron (Zofran) 8 MG tablet; Take 1 tablet by mouth Every 8 (Eight) Hours As Needed for Nausea or Vomiting.    Family history of early CAD  -     Ambulatory Referral to Cardiology    will refill Zofran  Will start pt on Amlodipine Benazepril 5/10 daily and have pt monitor BP and call back if results are not to goal 120/80   Will place referral to Cardiology as requested by patient  Pt agrees

## 2020-06-03 DIAGNOSIS — K86.1 IDIOPATHIC CHRONIC PANCREATITIS (HCC): ICD-10-CM

## 2020-06-03 DIAGNOSIS — M79.7 FIBROMYALGIA: ICD-10-CM

## 2020-06-03 RX ORDER — IBUPROFEN 800 MG/1
800 TABLET ORAL 3 TIMES DAILY PRN
Qty: 90 TABLET | Refills: 1 | Status: SHIPPED | OUTPATIENT
Start: 2020-06-03 | End: 2020-07-31

## 2020-06-08 DIAGNOSIS — M79.7 FIBROMYALGIA: ICD-10-CM

## 2020-06-10 RX ORDER — CYCLOBENZAPRINE HCL 10 MG
10 TABLET ORAL 3 TIMES DAILY PRN
Qty: 90 TABLET | Refills: 1 | Status: SHIPPED | OUTPATIENT
Start: 2020-06-10 | End: 2020-08-10

## 2020-06-11 ENCOUNTER — APPOINTMENT (OUTPATIENT)
Dept: CT IMAGING | Facility: HOSPITAL | Age: 40
End: 2020-06-11

## 2020-06-11 ENCOUNTER — HOSPITAL ENCOUNTER (EMERGENCY)
Facility: HOSPITAL | Age: 40
Discharge: HOME | End: 2020-06-11
Attending: EMERGENCY MEDICINE | Admitting: EMERGENCY MEDICINE

## 2020-06-11 VITALS
WEIGHT: 208 LBS | SYSTOLIC BLOOD PRESSURE: 128 MMHG | HEIGHT: 66 IN | RESPIRATION RATE: 20 BRPM | OXYGEN SATURATION: 100 % | TEMPERATURE: 98.3 F | BODY MASS INDEX: 33.43 KG/M2 | DIASTOLIC BLOOD PRESSURE: 81 MMHG | HEART RATE: 82 BPM

## 2020-06-11 DIAGNOSIS — R11.2 NON-INTRACTABLE VOMITING WITH NAUSEA, UNSPECIFIED VOMITING TYPE: ICD-10-CM

## 2020-06-11 DIAGNOSIS — K21.9 GASTROESOPHAGEAL REFLUX DISEASE, ESOPHAGITIS PRESENCE NOT SPECIFIED: ICD-10-CM

## 2020-06-11 DIAGNOSIS — I10 ESSENTIAL HYPERTENSION: ICD-10-CM

## 2020-06-11 DIAGNOSIS — K58.0 IRRITABLE BOWEL SYNDROME WITH DIARRHEA: ICD-10-CM

## 2020-06-11 DIAGNOSIS — R10.13 EPIGASTRIC ABDOMINAL PAIN: Primary | ICD-10-CM

## 2020-06-11 LAB
ALBUMIN SERPL-MCNC: 4.6 G/DL (ref 3.5–5.2)
ALBUMIN/GLOB SERPL: 1.8 G/DL
ALP SERPL-CCNC: 111 U/L (ref 39–117)
ALT SERPL W P-5'-P-CCNC: 17 U/L (ref 1–33)
ANION GAP SERPL CALCULATED.3IONS-SCNC: 11 MMOL/L (ref 5–15)
AST SERPL-CCNC: 17 U/L (ref 1–32)
BASOPHILS # BLD AUTO: 0.05 10*3/MM3 (ref 0–0.2)
BASOPHILS NFR BLD AUTO: 0.5 % (ref 0–1.5)
BILIRUB SERPL-MCNC: 0.2 MG/DL (ref 0.2–1.2)
BILIRUB UR QL STRIP: NEGATIVE
BUN BLD-MCNC: 7 MG/DL (ref 6–20)
BUN/CREAT SERPL: 7.4 (ref 7–25)
CALCIUM SPEC-SCNC: 10.4 MG/DL (ref 8.6–10.5)
CHLORIDE SERPL-SCNC: 103 MMOL/L (ref 98–107)
CLARITY UR: CLEAR
CO2 SERPL-SCNC: 25 MMOL/L (ref 22–29)
COLOR UR: YELLOW
CREAT BLD-MCNC: 0.95 MG/DL (ref 0.57–1)
D-LACTATE SERPL-SCNC: 1.8 MMOL/L (ref 0.5–2)
DEPRECATED RDW RBC AUTO: 43.1 FL (ref 37–54)
EOSINOPHIL # BLD AUTO: 0.23 10*3/MM3 (ref 0–0.4)
EOSINOPHIL NFR BLD AUTO: 2.4 % (ref 0.3–6.2)
ERYTHROCYTE [DISTWIDTH] IN BLOOD BY AUTOMATED COUNT: 13.9 % (ref 12.3–15.4)
GFR SERPL CREATININE-BSD FRML MDRD: 65 ML/MIN/1.73
GLOBULIN UR ELPH-MCNC: 2.6 GM/DL
GLUCOSE BLD-MCNC: 101 MG/DL (ref 65–99)
GLUCOSE UR STRIP-MCNC: NEGATIVE MG/DL
HCT VFR BLD AUTO: 42.8 % (ref 34–46.6)
HGB BLD-MCNC: 13.5 G/DL (ref 12–15.9)
HGB UR QL STRIP.AUTO: NEGATIVE
HOLD SPECIMEN: NORMAL
HOLD SPECIMEN: NORMAL
IMM GRANULOCYTES # BLD AUTO: 0.03 10*3/MM3 (ref 0–0.05)
IMM GRANULOCYTES NFR BLD AUTO: 0.3 % (ref 0–0.5)
KETONES UR QL STRIP: NEGATIVE
LEUKOCYTE ESTERASE UR QL STRIP.AUTO: NEGATIVE
LIPASE SERPL-CCNC: 34 U/L (ref 13–60)
LYMPHOCYTES # BLD AUTO: 3.96 10*3/MM3 (ref 0.7–3.1)
LYMPHOCYTES NFR BLD AUTO: 41.9 % (ref 19.6–45.3)
MCH RBC QN AUTO: 27 PG (ref 26.6–33)
MCHC RBC AUTO-ENTMCNC: 31.5 G/DL (ref 31.5–35.7)
MCV RBC AUTO: 85.6 FL (ref 79–97)
MONOCYTES # BLD AUTO: 0.59 10*3/MM3 (ref 0.1–0.9)
MONOCYTES NFR BLD AUTO: 6.3 % (ref 5–12)
NEUTROPHILS # BLD AUTO: 4.58 10*3/MM3 (ref 1.7–7)
NEUTROPHILS NFR BLD AUTO: 48.6 % (ref 42.7–76)
NITRITE UR QL STRIP: NEGATIVE
NRBC BLD AUTO-RTO: 0 /100 WBC (ref 0–0.2)
PH UR STRIP.AUTO: 7 [PH] (ref 5–8)
PLATELET # BLD AUTO: 320 10*3/MM3 (ref 140–450)
PMV BLD AUTO: 10.4 FL (ref 6–12)
POTASSIUM BLD-SCNC: 4.4 MMOL/L (ref 3.5–5.2)
PROT SERPL-MCNC: 7.2 G/DL (ref 6–8.5)
PROT UR QL STRIP: NEGATIVE
RBC # BLD AUTO: 5 10*6/MM3 (ref 3.77–5.28)
SODIUM BLD-SCNC: 139 MMOL/L (ref 136–145)
SP GR UR STRIP: 1.01 (ref 1–1.03)
UROBILINOGEN UR QL STRIP: NORMAL
WBC NRBC COR # BLD: 9.44 10*3/MM3 (ref 3.4–10.8)
WHOLE BLOOD HOLD SPECIMEN: NORMAL
WHOLE BLOOD HOLD SPECIMEN: NORMAL

## 2020-06-11 PROCEDURE — 25010000002 PROCHLORPERAZINE 10 MG/2ML SOLUTION: Performed by: PHYSICIAN ASSISTANT

## 2020-06-11 PROCEDURE — 71250 CT THORAX DX C-: CPT

## 2020-06-11 PROCEDURE — 83690 ASSAY OF LIPASE: CPT | Performed by: EMERGENCY MEDICINE

## 2020-06-11 PROCEDURE — 25010000002 LORAZEPAM PER 2 MG: Performed by: EMERGENCY MEDICINE

## 2020-06-11 PROCEDURE — 99284 EMERGENCY DEPT VISIT MOD MDM: CPT

## 2020-06-11 PROCEDURE — 83605 ASSAY OF LACTIC ACID: CPT | Performed by: EMERGENCY MEDICINE

## 2020-06-11 PROCEDURE — 96375 TX/PRO/DX INJ NEW DRUG ADDON: CPT

## 2020-06-11 PROCEDURE — 96374 THER/PROPH/DIAG INJ IV PUSH: CPT

## 2020-06-11 PROCEDURE — 80053 COMPREHEN METABOLIC PANEL: CPT | Performed by: EMERGENCY MEDICINE

## 2020-06-11 PROCEDURE — 85025 COMPLETE CBC W/AUTO DIFF WBC: CPT | Performed by: EMERGENCY MEDICINE

## 2020-06-11 PROCEDURE — 74177 CT ABD & PELVIS W/CONTRAST: CPT

## 2020-06-11 PROCEDURE — 81003 URINALYSIS AUTO W/O SCOPE: CPT | Performed by: EMERGENCY MEDICINE

## 2020-06-11 PROCEDURE — 25010000002 IOPAMIDOL 61 % SOLUTION: Performed by: EMERGENCY MEDICINE

## 2020-06-11 RX ORDER — SODIUM CHLORIDE 0.9 % (FLUSH) 0.9 %
10 SYRINGE (ML) INJECTION AS NEEDED
Status: DISCONTINUED | OUTPATIENT
Start: 2020-06-11 | End: 2020-06-11 | Stop reason: HOSPADM

## 2020-06-11 RX ORDER — LORAZEPAM 2 MG/ML
1 INJECTION INTRAMUSCULAR ONCE
Status: COMPLETED | OUTPATIENT
Start: 2020-06-11 | End: 2020-06-11

## 2020-06-11 RX ORDER — PROCHLORPERAZINE EDISYLATE 5 MG/ML
5 INJECTION INTRAMUSCULAR; INTRAVENOUS EVERY 6 HOURS PRN
Status: DISCONTINUED | OUTPATIENT
Start: 2020-06-11 | End: 2020-06-11 | Stop reason: HOSPADM

## 2020-06-11 RX ORDER — PROCHLORPERAZINE MALEATE 10 MG
10 TABLET ORAL EVERY 6 HOURS PRN
Qty: 30 TABLET | Refills: 0 | Status: SHIPPED | OUTPATIENT
Start: 2020-06-11 | End: 2021-10-19 | Stop reason: ALTCHOICE

## 2020-06-11 RX ADMIN — PROCHLORPERAZINE EDISYLATE 5 MG: 5 INJECTION INTRAMUSCULAR; INTRAVENOUS at 13:55

## 2020-06-11 RX ADMIN — LORAZEPAM 1 MG: 2 INJECTION INTRAMUSCULAR; INTRAVENOUS at 13:55

## 2020-06-11 RX ADMIN — IOPAMIDOL 95 ML: 612 INJECTION, SOLUTION INTRAVENOUS at 14:46

## 2020-06-11 NOTE — ED PROVIDER NOTES
EMERGENCY DEPARTMENT ENCOUNTER    Room Number:  27/27  Date of encounter:  6/11/2020  PCP: Provider, No Known  Historian: PATIENT      HPI:  Chief Complaint: Shortness of breath cough nausea vomiting abdominal pain.    A complete HPI/ROS/PMH/PSH/SH/FH are unobtainable due to:NA     Context: Nia Bah is a 40 y.o. female who presents to the ED with multiple complaints, ranging from hypertension, head and chest congestion, headache, body aches, cough, upper abdominal pain, nausea, vomiting for the past 2 months.  Patient states she has had a history of pancreatitis in the past.  States has been seen by [facility] emergency department at least 12 times in the past 2 months, has been seen by her primary care provider, and has been has been evaluated by Dr. Guillen with gastroenterology.  Patient states no one is able to give her a definitive diagnosis for her symptoms.  When pressed, she states her main concern today is her nausea vomiting and upper abdominal pain.  Most recently she has been evaluated by [facility] emergency department, and diagnosed with pancreatitis.  She followed up with Dr. Guillen, who ordered an MRCP.  She was found to have pancreas divisum, was prescribed Creon, and a fecal elastase test was ordered.  To date that test has not been completed according to records.  No appetite change.  She has experienced fatigue, no fevers, no unexpected weight changes.  She denies fevers chills or sweats.  No sputum or hemoptysis.  No chest pain, no arm neck jaw shoulder pain or palpitations.  She does admit to abdominal pain diarrhea vomiting and nausea.  She does complain of pain in the right upper abdomen and epigastrium associated with frequent nausea and vomiting.  No dysuria hematuria or pyuria.  No melena hematochezia or fatty stools.  No pale chalky or liliana colored stools.  No history of immunocompromise food allergies or environmental allergies.  No seizures syncope  dizziness lightheadedness.  She still smokes around a pack of cigarettes per day.      PAST MEDICAL HISTORY  Active Ambulatory Problems     Diagnosis Date Noted   • Elevated blood pressure 05/06/2016   • Environmental allergies 06/03/2016   • Gastroesophageal reflux disease 06/03/2016   • Irritable bowel syndrome with diarrhea 06/03/2016   • Vitamin D deficiency 06/03/2016   • Depression with anxiety 06/03/2016   • Encounter for monitoring long-term proton pump inhibitor therapy 06/03/2016   • Seasonal allergies 03/21/2017   • Chronic pain of both lower extremities 05/30/2017   • Tobacco use 05/30/2017   • BMI 31.0-31.9,adult 01/29/2018   • Class 1 obesity due to excess calories without serious comorbidity with body mass index (BMI) of 31.0 to 31.9 in adult 09/12/2019     Resolved Ambulatory Problems     Diagnosis Date Noted   • Anxiety 05/06/2016   • Arthralgia 03/21/2017   • Hypotension 03/21/2017   • Boil of neck 03/21/2017   • Left ear pain 03/21/2017     Past Medical History:   Diagnosis Date   • Acute nonintractable headache    • Acute otitis externa of left ear    • Acute otitis media    • Allergic    • Back pain    • Blurry vision    • Contact dermatitis due to poison ivy    • Depression    • Diarrhea    • Dizziness    • Edema    • Factor V Leiden mutation (CMS/HCC)    • Fatigue    • Flatulence    • Fractures, compound    • Gastritis    • Gastroenteritis    • GERD (gastroesophageal reflux disease)    • H/O blood clots    • Hair loss    • History of cardiac disorder    • History of kidney infection    • History of seizure disorder    • History of seizures    • Hypertension    • Joint pain, hip    • Low serum vitamin D    • Miscarriage    • Nausea    • Otalgia of left ear    • Rash    • Renal calculi    • Speech complaints          PAST SURGICAL HISTORY  Past Surgical History:   Procedure Laterality Date   • CHOLECYSTECTOMY     • HIP SURGERY      multiple since 5th grade x 12 replacements as well    •  HYSTERECTOMY      CHRISTIANO    • TUBAL ABDOMINAL LIGATION           FAMILY HISTORY  Family History   Problem Relation Age of Onset   • Arthritis Mother    • Depression Mother    • Diabetes Mother    • Heart disease Mother    • Hyperlipidemia Mother    • Hypertension Mother    • Stroke Mother    • Arthritis Father    • Depression Father    • Hypertension Father    • Asthma Daughter    • Depression Daughter    • Asthma Son    • Depression Son    • Arthritis Maternal Grandmother    • Cancer Maternal Grandmother    • Depression Maternal Grandmother    • Diabetes Maternal Grandmother    • Depression Maternal Grandfather    • Arthritis Paternal Grandmother    • Depression Paternal Grandmother    • Depression Paternal Grandfather    • Arthritis Other    • Mental illness Other          SOCIAL HISTORY  Social History     Socioeconomic History   • Marital status: Single     Spouse name: Not on file   • Number of children: Not on file   • Years of education: Not on file   • Highest education level: Not on file   Tobacco Use   • Smoking status: Current Every Day Smoker   • Smokeless tobacco: Never Used   Substance and Sexual Activity   • Alcohol use: Yes   • Drug use: No         ALLERGIES  Patient has no known allergies.        REVIEW OF SYSTEMS  Review of Systems     All systems reviewed and negative except for those discussed in HPI.       PHYSICAL EXAM    I have reviewed the triage vital signs and nursing notes.    ED Triage Vitals [06/11/20 1200]   Temp Heart Rate Resp BP SpO2   98.1 °F (36.7 °C) 95 22 (!) 148/110 100 %      Temp src Heart Rate Source Patient Position BP Location FiO2 (%)   Infrared Monitor Sitting Left arm --       Physical Exam  GENERAL: Tearful anxious, alert oriented no acute distress, hemodynamically stable.  Well developed.  Appears in no acute distress.  HENT: TMs canals are clear oropharynx is clear mucous membranes are moist airways patent and uvula is midline.  EYES: PERRLA EOMI sclera conjunctive are  clear   CV: Regular rhythm, regular rate no murmurs rubs or gallops  RESPIRATORY: Normal effort.  No audible wheezes, rales or rhonchi  ABDOMEN: Soft, nontender, no guarding or rebound tenderness.  Bowel sounds are normal.  No palpable organomegaly or pulsatile masses.  No CVA or suprapubic tenderness.  MUSCULOSKELETAL: No deformities.   NEURO: Alert, moves all extremities, follows commands  SKIN: Warm, dry, no rash visualized        LAB RESULTS  Recent Results (from the past 24 hour(s))   Lactic Acid, Plasma    Collection Time: 06/11/20  1:15 PM   Result Value Ref Range    Lactate 1.8 0.5 - 2.0 mmol/L   Green Top (Gel)    Collection Time: 06/11/20  1:15 PM   Result Value Ref Range    Extra Tube Hold for add-ons.    Lavender Top    Collection Time: 06/11/20  1:15 PM   Result Value Ref Range    Extra Tube hold for add-on    CBC Auto Differential    Collection Time: 06/11/20  1:15 PM   Result Value Ref Range    WBC 9.44 3.40 - 10.80 10*3/mm3    RBC 5.00 3.77 - 5.28 10*6/mm3    Hemoglobin 13.5 12.0 - 15.9 g/dL    Hematocrit 42.8 34.0 - 46.6 %    MCV 85.6 79.0 - 97.0 fL    MCH 27.0 26.6 - 33.0 pg    MCHC 31.5 31.5 - 35.7 g/dL    RDW 13.9 12.3 - 15.4 %    RDW-SD 43.1 37.0 - 54.0 fl    MPV 10.4 6.0 - 12.0 fL    Platelets 320 140 - 450 10*3/mm3    Neutrophil % 48.6 42.7 - 76.0 %    Lymphocyte % 41.9 19.6 - 45.3 %    Monocyte % 6.3 5.0 - 12.0 %    Eosinophil % 2.4 0.3 - 6.2 %    Basophil % 0.5 0.0 - 1.5 %    Immature Grans % 0.3 0.0 - 0.5 %    Neutrophils, Absolute 4.58 1.70 - 7.00 10*3/mm3    Lymphocytes, Absolute 3.96 (H) 0.70 - 3.10 10*3/mm3    Monocytes, Absolute 0.59 0.10 - 0.90 10*3/mm3    Eosinophils, Absolute 0.23 0.00 - 0.40 10*3/mm3    Basophils, Absolute 0.05 0.00 - 0.20 10*3/mm3    Immature Grans, Absolute 0.03 0.00 - 0.05 10*3/mm3    nRBC 0.0 0.0 - 0.2 /100 WBC   Comprehensive Metabolic Panel    Collection Time: 06/11/20  1:16 PM   Result Value Ref Range    Glucose 101 (H) 65 - 99 mg/dL    BUN 7 6 - 20 mg/dL     Creatinine 0.95 0.57 - 1.00 mg/dL    Sodium 139 136 - 145 mmol/L    Potassium 4.4 3.5 - 5.2 mmol/L    Chloride 103 98 - 107 mmol/L    CO2 25.0 22.0 - 29.0 mmol/L    Calcium 10.4 8.6 - 10.5 mg/dL    Total Protein 7.2 6.0 - 8.5 g/dL    Albumin 4.60 3.50 - 5.20 g/dL    ALT (SGPT) 17 1 - 33 U/L    AST (SGOT) 17 1 - 32 U/L    Alkaline Phosphatase 111 39 - 117 U/L    Total Bilirubin 0.2 0.2 - 1.2 mg/dL    eGFR Non African Amer 65 >60 mL/min/1.73    Globulin 2.6 gm/dL    A/G Ratio 1.8 g/dL    BUN/Creatinine Ratio 7.4 7.0 - 25.0    Anion Gap 11.0 5.0 - 15.0 mmol/L   Lipase    Collection Time: 06/11/20  1:16 PM   Result Value Ref Range    Lipase 34 13 - 60 U/L   Urinalysis With Microscopic If Indicated (No Culture) - Urine, Clean Catch    Collection Time: 06/11/20  1:16 PM   Result Value Ref Range    Color, UA Yellow Yellow, Straw    Appearance, UA Clear Clear    pH, UA 7.0 5.0 - 8.0    Specific Gravity, UA 1.014 1.001 - 1.030    Glucose, UA Negative Negative    Ketones, UA Negative Negative    Bilirubin, UA Negative Negative    Blood, UA Negative Negative    Protein, UA Negative Negative    Leuk Esterase, UA Negative Negative    Nitrite, UA Negative Negative    Urobilinogen, UA 1.0 E.U./dL 0.2 - 1.0 E.U./dL   Light Blue Top    Collection Time: 06/11/20  1:16 PM   Result Value Ref Range    Extra Tube hold for add-on    Gold Top - SST    Collection Time: 06/11/20  1:16 PM   Result Value Ref Range    Extra Tube Hold for add-ons.        Ordered the above labs and independently reviewed the results.        RADIOLOGY  Ct Chest Without Contrast    Result Date: 6/11/2020  EXAMINATION: CT CHEST WO CONTRAST-, CT ABDOMEN AND PELVIS W CONTRAST-06/11/2020:  INDICATION: COUGH X 2 MONTHS, FREQUENT VOMITING.  TECHNIQUE: 5 mm unenhanced images through the chest, post-IV contrast 5 mm portal venous phase and delayed venous phase images through the abdomen and pelvis.  The radiation dose reduction device was turned on for each scan per the  ALARA (As Low as Reasonably Achievable) protocol.  COMPARISON: No recent comparison study; Abdomen and pelvis CT scan from 01/15/2010, chest CT scan from 03/25/2008 and abdominal MRI 04/24/2020.  FINDINGS: The patient history indicates dyspnea, cough, vomiting, upper abdominal pain x 2 months.  CHEST CT SCAN WITHOUT CONTRAST: No significant mediastinal, hilar or axillary adenopathy, pericardial or pleural effusion is seen. Lung window images show a small, almost linear scar or nodule, 6.5 x 1.5 mm in diameter in the right upper lobe, probably the previously noted calcification here on the 2008 scan, with image blurring from patient motion. Images are uniformly motion degraded, as apparently the patient cannot suspend respiration for the study, allowing for this, no definite pulmonary parenchymal disease is identified elsewhere.      1.  Motion-degraded exam, with small calcified granuloma in the right upper lobe as on 2008 study. 2.  No new chest disease is identified.   ABDOMEN AND PELVIS CT SCAN WITH CONTRAST: There is diffuse fatty liver change expected for body habitus. No significant abnormalities are noted of the liver, spleen, pancreas, adrenal glands, or kidneys. The gallbladder, by history is surgically absent. No upper abdominal free air, ascites, adenopathy, or acute inflammatory focus is appreciated. Large and small bowel loops are normal in caliber and normal in appearance.  Regarding the lower abdomen and pelvis, the terminal ileum and cecum appear normal. The appendix is not identified but no evidence of an enlarged or inflamed appendix is seen. The bowel loops are normal in caliber. There appears to be a normal amount of stool shadow present. The uterus and ovaries are apparently surgically absent. The floor of the pelvis is obscured by bilateral streak artifact, but no gross abnormalities are seen. The bladder is normally distended and normal in appearance. Delayed images show normal contrast  opacification of renal collecting systems, ureters, and bladder. There is a small hypoenhancing 12 mm lesion on the right renal midpole, and other small cysts only readily appreciated on the delayed series. These appear very similar to the previous 04/24/2020 abdominal MRI.  IMPRESSION: 1. Probable small renal cyst as seen on previous MRI. 2. No evidence of acute intra-abdominal or intrapelvic disease is identified. In particular, no acute inflammatory focus is identified.  D:  06/11/2020 E:  06/11/2020  This report was finalized on 6/11/2020 8:37 PM by Dr. Wan Polanco MD.      Ct Abdomen Pelvis With Contrast    Result Date: 6/11/2020  EXAMINATION: CT CHEST WO CONTRAST-, CT ABDOMEN AND PELVIS W CONTRAST-06/11/2020:  INDICATION: COUGH X 2 MONTHS, FREQUENT VOMITING.  TECHNIQUE: 5 mm unenhanced images through the chest, post-IV contrast 5 mm portal venous phase and delayed venous phase images through the abdomen and pelvis.  The radiation dose reduction device was turned on for each scan per the ALARA (As Low as Reasonably Achievable) protocol.  COMPARISON: No recent comparison study; Abdomen and pelvis CT scan from 01/15/2010, chest CT scan from 03/25/2008 and abdominal MRI 04/24/2020.  FINDINGS: The patient history indicates dyspnea, cough, vomiting, upper abdominal pain x 2 months.  CHEST CT SCAN WITHOUT CONTRAST: No significant mediastinal, hilar or axillary adenopathy, pericardial or pleural effusion is seen. Lung window images show a small, almost linear scar or nodule, 6.5 x 1.5 mm in diameter in the right upper lobe, probably the previously noted calcification here on the 2008 scan, with image blurring from patient motion. Images are uniformly motion degraded, as apparently the patient cannot suspend respiration for the study, allowing for this, no definite pulmonary parenchymal disease is identified elsewhere.      1.  Motion-degraded exam, with small calcified granuloma in the right upper lobe as on 2008  study. 2.  No new chest disease is identified.   ABDOMEN AND PELVIS CT SCAN WITH CONTRAST: There is diffuse fatty liver change expected for body habitus. No significant abnormalities are noted of the liver, spleen, pancreas, adrenal glands, or kidneys. The gallbladder, by history is surgically absent. No upper abdominal free air, ascites, adenopathy, or acute inflammatory focus is appreciated. Large and small bowel loops are normal in caliber and normal in appearance.  Regarding the lower abdomen and pelvis, the terminal ileum and cecum appear normal. The appendix is not identified but no evidence of an enlarged or inflamed appendix is seen. The bowel loops are normal in caliber. There appears to be a normal amount of stool shadow present. The uterus and ovaries are apparently surgically absent. The floor of the pelvis is obscured by bilateral streak artifact, but no gross abnormalities are seen. The bladder is normally distended and normal in appearance. Delayed images show normal contrast opacification of renal collecting systems, ureters, and bladder. There is a small hypoenhancing 12 mm lesion on the right renal midpole, and other small cysts only readily appreciated on the delayed series. These appear very similar to the previous 04/24/2020 abdominal MRI.  IMPRESSION: 1. Probable small renal cyst as seen on previous MRI. 2. No evidence of acute intra-abdominal or intrapelvic disease is identified. In particular, no acute inflammatory focus is identified.  D:  06/11/2020 E:  06/11/2020  This report was finalized on 6/11/2020 8:37 PM by Dr. Wan Polanco MD.               PROCEDURES    Procedures      MEDICATIONS GIVEN IN ER    Medications   LORazepam (ATIVAN) injection 1 mg (1 mg Intravenous Given 6/11/20 7167)   iopamidol (ISOVUE-300) 61 % injection 100 mL (95 mL Intravenous Given 6/11/20 1446)         PROGRESS, DATA ANALYSIS, CONSULTS, AND MEDICAL DECISION MAKING    All labs have been independently reviewed by  me.  All radiology studies have been reviewed by me and the radiologist dictating the report.   EKG's have been independently viewed and interpreted by me.                       AS OF 21:21 VITALS:    BP - 128/81  HR - 82  TEMP - 98.3 °F (36.8 °C) (Oral)  O2 SATS - 100%        DIAGNOSIS  Final diagnoses:   Epigastric abdominal pain   Non-intractable vomiting with nausea, unspecified vomiting type   Irritable bowel syndrome with diarrhea   Gastroesophageal reflux disease, esophagitis presence not specified   Essential hypertension         DISPOSITION  DISCHARGE    Patient discharged in stable condition.    Reviewed implications of results, diagnosis, meds, responsibility to follow up, warning signs and symptoms of possible worsening, potential complications and reasons to return to ER.    Patient/Family voiced understanding of above instructions.    Discussed plan for discharge, as there is no emergent indication for admission.  Pt/family is agreeable and understands need for follow up and possible repeat testing.  Pt/family is aware that discharge does not mean that nothing is wrong but that it indicates no emergency is currently present that requires admission and they must continue care with follow-up as given below or with a physician of their choice.     FOLLOW-UP  Santa Bird MD  [address] [place] [address]  [phone]    Schedule an appointment as soon as possible for a visit            Medication List      New Prescriptions    prochlorperazine 10 MG tablet  Commonly known as:  COMPAZINE  Take 1 tablet by mouth Every 6 (Six) Hours As Needed for Nausea or   Vomiting.                   Arnel Weaver PA-C  06/11/20 4391

## 2020-06-11 NOTE — DISCHARGE INSTRUCTIONS
Follow-up with Dr. Bird. call office tomorrow to schedule repeat evaluation.  Continue your present medications.  Try Compazine 10 mg every 8 hours as needed for nausea.  Strongly recommend trying low FODMAP diet and IBS diet.  Return immediately if any change or worsening of symptoms.

## 2020-06-18 ENCOUNTER — OFFICE VISIT (OUTPATIENT)
Dept: FAMILY MEDICINE CLINIC | Facility: CLINIC | Age: 40
End: 2020-06-18

## 2020-06-18 VITALS
DIASTOLIC BLOOD PRESSURE: 80 MMHG | BODY MASS INDEX: 32.78 KG/M2 | TEMPERATURE: 97.5 F | SYSTOLIC BLOOD PRESSURE: 118 MMHG | HEIGHT: 66 IN | WEIGHT: 204 LBS | HEART RATE: 84 BPM | RESPIRATION RATE: 24 BRPM

## 2020-06-18 DIAGNOSIS — M79.7 FIBROMYALGIA: Primary | ICD-10-CM

## 2020-06-18 PROCEDURE — 99213 OFFICE O/P EST LOW 20 MIN: CPT | Performed by: NURSE PRACTITIONER

## 2020-06-18 PROCEDURE — 96372 THER/PROPH/DIAG INJ SC/IM: CPT | Performed by: NURSE PRACTITIONER

## 2020-06-18 RX ORDER — KETOROLAC TROMETHAMINE 30 MG/ML
60 INJECTION, SOLUTION INTRAMUSCULAR; INTRAVENOUS ONCE
Status: COMPLETED | OUTPATIENT
Start: 2020-06-18 | End: 2020-06-18

## 2020-06-18 RX ADMIN — KETOROLAC TROMETHAMINE 60 MG: 30 INJECTION, SOLUTION INTRAMUSCULAR; INTRAVENOUS at 09:21

## 2020-06-18 NOTE — PROGRESS NOTES
Subjective   Nia Bah is a 40 y.o. female.     History of Present Illness   Bilateral leg pains over the past several days having difficulty walking at times  Wants to get a pain shot today  Has a job interview today   Having trouble walking due to pain in legs and feet and hips and back. Was told by Rheumatology that she has Fibromyalgia  She is changing her diet some eating less fatty fried foods to help with her stomach issues.  She still has some days she throws up but not as often    The following portions of the patient's history were reviewed and updated as appropriate: allergies, current medications, past family history, past medical history, past social history, past surgical history and problem list.    Review of Systems   Constitutional: Positive for activity change (due to pain).   Gastrointestinal: Positive for abdominal pain (off and on, getting some better), nausea and vomiting.   Musculoskeletal: Positive for arthralgias, back pain (chronic pain and hip and leg pains) and gait problem.   Psychiatric/Behavioral: Positive for sleep disturbance, depressed mood and stress.       Objective   Physical Exam   Constitutional: She is oriented to person, place, and time. She appears well-developed and well-nourished. She appears distressed.   Cardiovascular: Normal rate, regular rhythm and normal heart sounds.   Pulmonary/Chest: Effort normal and breath sounds normal.   Neurological: She is alert and oriented to person, place, and time.   Psychiatric: Her behavior is normal. Judgment and thought content normal. She exhibits a depressed mood.   Nursing note and vitals reviewed.        Assessment/Plan   Nia was seen today for follow-up and bilateral leg pain.    Diagnoses and all orders for this visit:    Fibromyalgia  -     ketorolac (TORADOL) injection 60 mg    Toradol 60 mg injection in office since pt has not taken any NSAIDs this morning

## 2020-07-02 ENCOUNTER — TELEPHONE (OUTPATIENT)
Dept: FAMILY MEDICINE CLINIC | Facility: CLINIC | Age: 40
End: 2020-07-02

## 2020-07-02 NOTE — TELEPHONE ENCOUNTER
Informed the patient that Santa was not in office till next week and that the providers who are in office have a full schedule. I advised her to go to a [facility] to be evaluated. Patient hung up the phone without an answer.

## 2020-07-02 NOTE — TELEPHONE ENCOUNTER
PT CALLED STATING THAT SHE HAS AN EAR INFECTION, COUGH, AND RUNNY NOSE.  PT STATED SHE WILL DO A VIDEO VISIT IF THERE IS ONE AVAILABLE.     PT CONTACT [phone]

## 2020-07-08 ENCOUNTER — OFFICE VISIT (OUTPATIENT)
Dept: GASTROENTEROLOGY | Facility: CLINIC | Age: 40
End: 2020-07-08

## 2020-07-08 DIAGNOSIS — R14.0 BLOATING: ICD-10-CM

## 2020-07-08 DIAGNOSIS — Q45.3 PANCREAS DIVISUM: ICD-10-CM

## 2020-07-08 DIAGNOSIS — R11.0 NAUSEA: Primary | ICD-10-CM

## 2020-07-08 DIAGNOSIS — R11.2 NAUSEA AND VOMITING IN ADULT: ICD-10-CM

## 2020-07-08 DIAGNOSIS — Z72.0 TOBACCO ABUSE: ICD-10-CM

## 2020-07-08 PROCEDURE — 99442 PR PHYS/QHP TELEPHONE EVALUATION 11-20 MIN: CPT | Performed by: INTERNAL MEDICINE

## 2020-07-08 RX ORDER — ONDANSETRON HYDROCHLORIDE 8 MG/1
8 TABLET, FILM COATED ORAL EVERY 8 HOURS PRN
Qty: 30 TABLET | Refills: 0 | Status: SHIPPED | OUTPATIENT
Start: 2020-07-08 | End: 2020-07-21 | Stop reason: SDUPTHER

## 2020-07-08 NOTE — PROGRESS NOTES
"You have chosen to receive care through a telephone visit. Do you consent to use a telephone visit for your medical care today? Yes  PCP: Santa Ray APRN    Chief Complaint   Patient presents with   • Follow-up   Nausea.  Some episodes of stomach swelling.  Not having issues with diarrhea and less abdominal pain.    History of Present Illness:   HPI  This was a telephone visit.  Patient consented for telephone visit.  Ms. Bah states that the Creon medication is helping with regard to the diarrhea issue.  The patient also had less abdominal pain.  Her main complaint today is bloating of the abdomen.  She also has periods of nausea.  Ms. Bah generally will eat only 1 time a day and that is supper.  The patient denies any early morning nausea.  There is no history of unexplained weight loss.  She denies any beverly vomiting of undigested food at this time.  Ms. Bah denies any localized abdominal pain at this time.  She will have some heartburn when she lies down in the evening.  Past Medical History:   Diagnosis Date   • Acute nonintractable headache     nonspcecified headache type    • Acute otitis externa of left ear    • Acute otitis media    • Allergic    • Anxiety    • Back pain    • Blurry vision    • Contact dermatitis due to poison ivy    • Depression    • Diarrhea    • Dizziness    • Edema    • Factor V Leiden mutation (CMS/HCC)     \"correction\"   • Fatigue    • Flatulence    • Fractures, compound     h/o    • Gastritis    • Gastroenteritis    • GERD (gastroesophageal reflux disease)    • H/O blood clots    • Hair loss    • History of cardiac disorder    • History of kidney infection    • History of seizure disorder    • History of seizures    • Hypertension    • Joint pain, hip    • Low serum vitamin D    • Miscarriage    • Nausea    • Otalgia of left ear    • Rash    • Renal calculi     personal h/o    • Speech complaints    • Vitamin D deficiency        Past Surgical History:   Procedure Laterality " Date   • CHOLECYSTECTOMY     • HIP SURGERY      multiple since 5th grade x 12 replacements as well    • HYSTERECTOMY      CHRI[facility]IANO    • TUBAL ABDOMINAL LIGATION           Current Outpatient Medications:   •  amLODIPine-benazepril (Lotrel) 5-10 MG per capsule, Take 1 capsule by mouth Daily., Disp: 90 capsule, Rfl: 1  •  cyclobenzaprine (FLEXERIL) 10 MG tablet, TAKE 1 TABLET BY MOUTH 3 (THREE) TIMES A DAY AS NEEDED FOR MUSCLE SPASMS., Disp: 90 tablet, Rfl: 1  •  dicyclomine (BENTYL) 20 MG tablet, Take 1 tablet by mouth 4 (Four) Times a Day., Disp: 120 tablet, Rfl: 5  •  DULoxetine (CYMBALTA) 60 MG capsule, Take 1 capsule by mouth Daily., Disp: 30 capsule, Rfl: 5  •  fluticasone (FLONASE) 50 MCG/ACT nasal spray, 2 sprays into the nostril(s) as directed by provider Daily., Disp: 16 g, Rfl: 5  •  hydrochlorothiazide (HYDRODIURIL) 25 MG tablet, Take 1 tablet by mouth Daily., Disp: 30 tablet, Rfl: 5  •  hydrOXYzine (ATARAX) 50 MG tablet, Take 1 tablet by mouth 3 (Three) Times a Day., Disp: 90 tablet, Rfl: 5  •  ibuprofen (ADVIL,MOTRIN) 800 MG tablet, TAKE 1 TABLET BY MOUTH 3 (THREE) TIMES A DAY AS NEEDED FOR MILD PAIN OR MODERATE PAIN ., Disp: 90 tablet, Rfl: 1  •  ketotifen (Zaditor) 0.025 % ophthalmic solution, Administer 1 drop to both eyes 2 (Two) Times a Day., Disp: 10 mL, Rfl: 1  •  levocetirizine (Xyzal) 5 MG tablet, Take 1 tablet by mouth Every Evening., Disp: 30 tablet, Rfl: 5  •  Multiple Vitamin (THERA-TABS) tablet, Take 1 tablet by mouth Every Morning., Disp: , Rfl: 0  •  mupirocin (Bactroban) 2 % ointment, Apply  topically to the appropriate area as directed 3 (Three) Times a Day., Disp: 22 g, Rfl: 0  •  ondansetron (Zofran) 8 MG tablet, Take 1 tablet by mouth Every 8 (Eight) Hours As Needed for Nausea or Vomiting., Disp: 30 tablet, Rfl: 0  •  Pancrelipase, Lip-Prot-Amyl, (Creon) 77835 units capsule delayed-release particles, Take 36,000 units of lipase by mouth 2 (Two) Times a Day With Meals., Disp: 180 capsule,  Rfl: 3  •  pantoprazole (Protonix) 40 MG EC tablet, Take 1 tablet by mouth Daily., Disp: 90 tablet, Rfl: 1  •  PREMARIN 0.625 MG/GM vaginal cream, , Disp: , Rfl:   •  prochlorperazine (COMPAZINE) 10 MG tablet, Take 1 tablet by mouth Every 6 (Six) Hours As Needed for Nausea or Vomiting., Disp: 30 tablet, Rfl: 0  •  promethazine (PHENERGAN) 25 MG suppository, As Needed., Disp: , Rfl:   •  promethazine (PHENERGAN) 25 MG tablet, Take 1 tablet by mouth Every 6 (Six) Hours As Needed for Nausea or Vomiting., Disp: 30 tablet, Rfl: 2  •  traZODone (DESYREL) 50 MG tablet, TAKE 1-2 TABLETS BY MOUTH EVERY NIGHT., Disp: 60 tablet, Rfl: 2  •  vitamin D (ERGOCALCIFEROL) 1.25 MG (22935 UT) capsule capsule, Take 1 capsule by mouth Every 7 (Seven) Days., Disp: 12 capsule, Rfl: 4  •  imipramine (TOFRANIL) 25 MG tablet, For anxiety, Disp: 90 tablet, Rfl: 1    No Known Allergies    Family History   Problem Relation Age of Onset   • Arthritis Mother    • Depression Mother    • Diabetes Mother    • Heart disease Mother    • Hyperlipidemia Mother    • Hypertension Mother    • Stroke Mother    • Arthritis Father    • Depression Father    • Hypertension Father    • Asthma Daughter    • Depression Daughter    • Asthma Son    • Depression Son    • Arthritis Maternal Grandmother    • Cancer Maternal Grandmother    • Depression Maternal Grandmother    • Diabetes Maternal Grandmother    • Depression Maternal Grandfather    • Arthritis Paternal Grandmother    • Depression Paternal Grandmother    • Depression Paternal Grandfather    • Arthritis Other    • Mental illness Other        Social History     Socioeconomic History   • Marital status: Single     Spouse name: Not on file   • Number of children: Not on file   • Years of education: Not on file   • Highest education level: Not on file   Tobacco Use   • Smoking status: Current Every Day Smoker   • Smokeless tobacco: Never Used   Substance and Sexual Activity   • Alcohol use: Yes   • Drug use: No        Review of Systems   Constitutional: Negative for activity change, appetite change, fatigue, fever and unexpected weight change.   HENT: Negative for dental problem, hearing loss, mouth sores, postnasal drip, sneezing, trouble swallowing and voice change.    Eyes: Negative for pain, redness, itching and visual disturbance.   Respiratory: Negative for cough, choking, chest tightness, shortness of breath and wheezing.    Cardiovascular: Negative for chest pain, palpitations and leg swelling.   Gastrointestinal: Positive for abdominal distention and abdominal pain. Negative for anal bleeding, blood in stool, constipation, diarrhea, nausea, rectal pain and vomiting.        Heartburn   Endocrine: Negative for cold intolerance, heat intolerance, polydipsia, polyphagia and polyuria.   Genitourinary: Negative.  Negative for dysuria, enuresis, flank pain, hematuria and urgency.   Musculoskeletal: Negative for arthralgias, back pain, gait problem, joint swelling and myalgias.   Skin: Negative for color change, pallor and rash.   Allergic/Immunologic: Negative for environmental allergies, food allergies and immunocompromised state.   Neurological: Negative for dizziness, tremors, seizures, facial asymmetry, speech difficulty, numbness and headaches.   Hematological: Negative for adenopathy.   Psychiatric/Behavioral: Negative for behavioral problems, confusion, dysphoric mood, hallucinations and self-injury.       There were no vitals filed for this visit.    Physical Exam    Nia was seen today for follow-up.    Diagnoses and all orders for this visit:    Nausea    Bloating    Pancreas divisum    Tobacco abuse    The clinical history is suggestive of possible gastroparesis.  She had an upper endoscopy performed by Dr. Khan in 2019 and there was no evidence of significant pathology at that juncture.  There has been  improvement with the use of Creon.  She did have a normal fecal elastase.  The most recent CT scan without  contrast did not show any obvious pancreatitis.        Plan: Will schedule 4 hour gastric emptying study.           Continue Creon with each meal and snacks.           Encourage tobacco cessation.           Discussed endoscopic treatment for pancreas divisum if needed at the [facility].           Will follow-up in 4 months.

## 2020-07-14 ENCOUNTER — TELEPHONE (OUTPATIENT)
Dept: FAMILY MEDICINE CLINIC | Facility: CLINIC | Age: 40
End: 2020-07-14

## 2020-07-14 NOTE — TELEPHONE ENCOUNTER
Please contact pt and let her know that I do not just call in steroids. She will need to make an appt; I can see her via video visit if this helps. ajc

## 2020-07-14 NOTE — TELEPHONE ENCOUNTER
PT IS REQUESTING A STEROID THAT WAS PRESCRIBED TO HER  FOR COUGHING TO BE CALLED INTO PHARMACY.    PT WAS UNSURE OF THE NAME.      PHARMACY CONFIRMED  PLEASE [facility]VISE  [phone]

## 2020-07-15 ENCOUNTER — OFFICE VISIT (OUTPATIENT)
Dept: FAMILY MEDICINE CLINIC | Facility: CLINIC | Age: 40
End: 2020-07-15

## 2020-07-15 VITALS
BODY MASS INDEX: 31.72 KG/M2 | RESPIRATION RATE: 16 BRPM | HEIGHT: 66 IN | HEART RATE: 76 BPM | SYSTOLIC BLOOD PRESSURE: 110 MMHG | DIASTOLIC BLOOD PRESSURE: 80 MMHG | WEIGHT: 197.4 LBS | TEMPERATURE: 97.8 F

## 2020-07-15 DIAGNOSIS — J40 BRONCHITIS: Primary | ICD-10-CM

## 2020-07-15 DIAGNOSIS — L98.9 CHANGING SKIN LESION: ICD-10-CM

## 2020-07-15 PROCEDURE — 99213 OFFICE O/P EST LOW 20 MIN: CPT | Performed by: NURSE PRACTITIONER

## 2020-07-15 RX ORDER — PREDNISONE 10 MG/1
TABLET ORAL
Qty: 21 TABLET | Refills: 0 | Status: SHIPPED | OUTPATIENT
Start: 2020-07-15 | End: 2020-09-10

## 2020-07-15 NOTE — PROGRESS NOTES
Subjective   Nia Bah is a 40 y.o. female.     History of Present Illness   Mole on back on right thigh betting bigger, has had it for a year or so, was very small  No previous skin cancer    Cough  Complains of non productive dry cough for the past several weeks   Every OTC tried and inhaler Albuterol does not help; having trouble sleeping due to cough  Smoker since age 12, 1/2 ppd   Gets bronchitis easily. No difficulty breathing, no wheezing or rattling in chest. No fever or chills, does not feel poorly  No hx of COPD has never had PFTs    The following portions of the patient's history were reviewed and updated as appropriate: allergies, current medications, past family history, past medical history, past social history, past surgical history and problem list.    Review of Systems   Constitutional: Negative for chills and fever.   HENT: Negative.  Negative for congestion, postnasal drip, rhinorrhea, sinus pressure and sore throat.    Respiratory: Positive for cough. Negative for chest tightness, shortness of breath and wheezing.    Cardiovascular: Negative.  Negative for chest pain.   Gastrointestinal: Negative.    Musculoskeletal: Negative.    Skin: Positive for skin lesions.   Allergic/Immunologic: Negative.    Neurological: Negative.    Hematological: Negative.    Psychiatric/Behavioral: Negative.        Objective   Physical Exam   Constitutional: She is oriented to person, place, and time. She appears well-developed and well-nourished. No distress.   HENT:   Head: Normocephalic and atraumatic.   Right Ear: External ear normal.   Left Ear: External ear normal.   Nose: Nose normal.   Mouth/Throat: Oropharynx is clear and moist. No oropharyngeal exudate.   Neck: Neck supple.   Cardiovascular: Normal rate, regular rhythm and normal heart sounds.   Pulmonary/Chest: Effort normal and breath sounds normal. No stridor. No respiratory distress. She has no wheezes. She has no rales.   Lymphadenopathy:      She has no cervical adenopathy.   Neurological: She is alert and oriented to person, place, and time.   Skin: Skin is warm and dry. She is not diaphoretic.   Flat round brown lesion on back of right thigh, non blanching, non tender lesion   Psychiatric: She has a normal mood and affect. Her behavior is normal. Judgment and thought content normal.   Nursing note and vitals reviewed.        Assessment/Plan   Nia was seen today for cough and check moles on r thigh.    Diagnoses and all orders for this visit:    Bronchitis  -     predniSONE (DELTASONE) 10 MG tablet; 60 mg day1, 50mg day2, 40mg day3, 30mg day4, 20mg day5, 10mg day6    Changing skin lesion  -     Ambulatory Referral to Dermatology    will refer to Dermatology for evaluation of lesion   Recommend pt quit smoking, pt due for PFTs to see if she has developed COPD or emphysema or restriction in lungs.   Use Albuterol as needed for wheezing  Will start pt on Prednisone, take as directed with food (splitting doses is recommended).   Pt to follow up if not improving

## 2020-07-21 DIAGNOSIS — K86.1 IDIOPATHIC CHRONIC PANCREATITIS (HCC): ICD-10-CM

## 2020-07-21 DIAGNOSIS — R11.0 NAUSEA: ICD-10-CM

## 2020-07-21 RX ORDER — ONDANSETRON 8 MG/1
TABLET, ORALLY DISINTEGRATING ORAL
Qty: 30 TABLET | Refills: 0 | Status: SHIPPED | OUTPATIENT
Start: 2020-07-21 | End: 2020-08-11

## 2020-07-31 DIAGNOSIS — M79.7 FIBROMYALGIA: ICD-10-CM

## 2020-07-31 DIAGNOSIS — K86.1 IDIOPATHIC CHRONIC PANCREATITIS (HCC): ICD-10-CM

## 2020-07-31 RX ORDER — IBUPROFEN 800 MG/1
800 TABLET ORAL 3 TIMES DAILY PRN
Qty: 90 TABLET | Refills: 1 | Status: SHIPPED | OUTPATIENT
Start: 2020-07-31 | End: 2020-10-23

## 2020-08-03 ENCOUNTER — TELEPHONE (OUTPATIENT)
Dept: GASTROENTEROLOGY | Facility: CLINIC | Age: 40
End: 2020-08-03

## 2020-08-03 NOTE — TELEPHONE ENCOUNTER
Called to state has been having N/V/D.  Has black stool.  Had taken some Pepto Bismol.  No weakness or dizziness      Advised to leave stool in commode for 5-10 min and if it turns red needs to come to ER

## 2020-08-05 ENCOUNTER — HOSPITAL ENCOUNTER (OUTPATIENT)
Dept: NUCLEAR MEDICINE | Facility: HOSPITAL | Age: 40
Discharge: HOME | End: 2020-08-05

## 2020-08-05 DIAGNOSIS — R14.0 BLOATING: ICD-10-CM

## 2020-08-05 DIAGNOSIS — R11.0 NAUSEA: ICD-10-CM

## 2020-08-05 PROCEDURE — A9541 TC99M SULFUR COLLOID: HCPCS | Performed by: INTERNAL MEDICINE

## 2020-08-05 PROCEDURE — 0 TECHNETIUM SULFUR COLLOID: Performed by: INTERNAL MEDICINE

## 2020-08-05 PROCEDURE — 78264 GASTRIC EMPTYING IMG STUDY: CPT

## 2020-08-05 RX ADMIN — TECHNETIUM TC 99M SULFUR COLLOID 1 DOSE: KIT at 09:15

## 2020-08-06 DIAGNOSIS — F11.93 OPIATE WITHDRAWAL (HCC): ICD-10-CM

## 2020-08-06 RX ORDER — TRAZODONE HYDROCHLORIDE 50 MG/1
50-100 TABLET ORAL NIGHTLY
Qty: 180 TABLET | Refills: 3 | Status: SHIPPED | OUTPATIENT
Start: 2020-08-06 | End: 2020-09-21

## 2020-08-09 DIAGNOSIS — M79.7 FIBROMYALGIA: ICD-10-CM

## 2020-08-10 DIAGNOSIS — K86.1 IDIOPATHIC CHRONIC PANCREATITIS (HCC): ICD-10-CM

## 2020-08-10 DIAGNOSIS — R11.0 NAUSEA: ICD-10-CM

## 2020-08-10 RX ORDER — CYCLOBENZAPRINE HCL 10 MG
10 TABLET ORAL 3 TIMES DAILY PRN
Qty: 90 TABLET | Refills: 1 | Status: SHIPPED | OUTPATIENT
Start: 2020-08-10 | End: 2021-02-19 | Stop reason: SDUPTHER

## 2020-08-11 RX ORDER — ONDANSETRON 8 MG/1
TABLET, ORALLY DISINTEGRATING ORAL
Qty: 30 TABLET | Refills: 0 | Status: SHIPPED | OUTPATIENT
Start: 2020-08-11 | End: 2020-09-29

## 2020-08-12 ENCOUNTER — TELEPHONE (OUTPATIENT)
Dept: GASTROENTEROLOGY | Facility: CLINIC | Age: 40
End: 2020-08-12

## 2020-09-08 DIAGNOSIS — F11.93 OPIATE WITHDRAWAL (HCC): ICD-10-CM

## 2020-09-08 RX ORDER — HYDROXYZINE 50 MG/1
TABLET, FILM COATED ORAL
Qty: 90 TABLET | Refills: 5 | Status: SHIPPED | OUTPATIENT
Start: 2020-09-08 | End: 2021-03-14

## 2020-09-10 ENCOUNTER — OFFICE VISIT (OUTPATIENT)
Dept: OBSTETRICS AND GYNECOLOGY | Facility: CLINIC | Age: 40
End: 2020-09-10

## 2020-09-10 ENCOUNTER — OFFICE VISIT (OUTPATIENT)
Dept: FAMILY MEDICINE CLINIC | Facility: CLINIC | Age: 40
End: 2020-09-10

## 2020-09-10 VITALS
BODY MASS INDEX: 32.17 KG/M2 | HEIGHT: 66 IN | DIASTOLIC BLOOD PRESSURE: 80 MMHG | SYSTOLIC BLOOD PRESSURE: 110 MMHG | RESPIRATION RATE: 16 BRPM | WEIGHT: 200.2 LBS | HEART RATE: 84 BPM | TEMPERATURE: 98.2 F

## 2020-09-10 VITALS
SYSTOLIC BLOOD PRESSURE: 100 MMHG | TEMPERATURE: 97.3 F | WEIGHT: 200 LBS | BODY MASS INDEX: 32.3 KG/M2 | DIASTOLIC BLOOD PRESSURE: 78 MMHG

## 2020-09-10 DIAGNOSIS — L73.2 HIDRADENITIS SUPPURATIVA: Primary | ICD-10-CM

## 2020-09-10 DIAGNOSIS — M54.42 ACUTE MIDLINE LOW BACK PAIN WITH LEFT-SIDED SCIATICA: Primary | ICD-10-CM

## 2020-09-10 PROCEDURE — 96372 THER/PROPH/DIAG INJ SC/IM: CPT | Performed by: NURSE PRACTITIONER

## 2020-09-10 PROCEDURE — 99213 OFFICE O/P EST LOW 20 MIN: CPT | Performed by: OBSTETRICS & GYNECOLOGY

## 2020-09-10 PROCEDURE — 99213 OFFICE O/P EST LOW 20 MIN: CPT | Performed by: NURSE PRACTITIONER

## 2020-09-10 RX ORDER — DOXYCYCLINE 100 MG/1
100 CAPSULE ORAL 2 TIMES DAILY
Qty: 28 CAPSULE | Refills: 0 | Status: SHIPPED | OUTPATIENT
Start: 2020-09-10 | End: 2020-09-24

## 2020-09-10 RX ORDER — TETRACYCLINE HYDROCHLORIDE 250 MG/1
250 CAPSULE ORAL 2 TIMES DAILY
Qty: 60 CAPSULE | Refills: 6 | Status: SHIPPED | OUTPATIENT
Start: 2020-09-10 | End: 2020-10-10

## 2020-09-10 RX ORDER — PREDNISONE 20 MG/1
TABLET ORAL
Qty: 10 TABLET | Refills: 0 | Status: SHIPPED | OUTPATIENT
Start: 2020-09-10 | End: 2020-11-04

## 2020-09-10 RX ORDER — FLUCONAZOLE 150 MG/1
150 TABLET ORAL DAILY
Qty: 2 TABLET | Refills: 0 | Status: SHIPPED | OUTPATIENT
Start: 2020-09-10 | End: 2020-09-10

## 2020-09-10 RX ORDER — KETOROLAC TROMETHAMINE 30 MG/ML
60 INJECTION, SOLUTION INTRAMUSCULAR; INTRAVENOUS ONCE
Status: COMPLETED | OUTPATIENT
Start: 2020-09-10 | End: 2020-09-10

## 2020-09-10 RX ADMIN — KETOROLAC TROMETHAMINE 60 MG: 30 INJECTION, SOLUTION INTRAMUSCULAR; INTRAVENOUS at 16:13

## 2020-09-10 NOTE — PROGRESS NOTES
Chief Complaint   Patient presents with   • Follow-up     possible vaginal cyst       Subjective   HPI  Nia Bah is a 40 y.o. female, , who presents for possible vaginal cyst.      She states she has experienced this problem for several months.  She describes the severity as severe.  She states that the problem is intermittent and worsening.  The patient reports additional symptoms as none.    She says that she has been going back and forth to her PCP and GYN for this same problem for months and they keep giving her abx but they're not working and she's having issues with all the abx. She is concerned that she could have MRSA.  Her last LMP was No LMP recorded. Patient has had a hysterectomy..   Partner Status: Marital Status: .  New Partners since last visit: no.  Desires STD Screening: no.    Additional OB/GYN History   Current contraception: contraceptive methods: Hysterectomy  Desires to: do not start contraception  Last Pap :   Last Completed Pap Smear       Status Date      PAP SMEAR No completions recorded        History of abnormal Pap smear: yes - unknown results  Last mammogram:   Last Completed Mammogram     Patient has no health maintenance due at this time        Tobacco Usage?: Yes Nia Bah  reports that she has been smoking. She has never used smokeless tobacco.. I have educated her on the risk of diseases from using tobacco products such as cancer, COPD and heart diease.     I advised her to quit and she is not willing to quit.    I spent 3  minutes counseling the patient.        OB History        4    Para   2    Term   2            AB   2    Living   2       SAB   2    TAB        Ectopic        Molar        Multiple        Live Births   2                Health Maintenance   Topic Date Due   • Annual Gynecologic Pelvic and Breast Exam  1980   • TDAP/TD VACCINES (1 - Tdap) 1991   • PNEUMOCOCCAL VACCINE (19-64 MEDIUM RISK) ( of 1 -  PPSV23) 01/21/1999   • PAP SMEAR  05/06/2016   • ANNUAL PHYSICAL  11/30/2019   • INFLUENZA VACCINE  08/01/2020   • HEPATITIS C SCREENING  Completed       The additional following portions of the patient's history were reviewed and updated as appropriate: allergies, current medications, past family history, past medical history, past social history, past surgical history and problem list.    Review of Systems   Constitutional: Negative.    HENT: Negative.    Eyes: Negative.    Respiratory: Negative.    Cardiovascular: Negative.    Gastrointestinal: Negative.    Endocrine: Negative.    Genitourinary: Positive for vaginal pain.   Musculoskeletal: Negative.    Skin: Negative.    Allergic/Immunologic: Negative.    Neurological: Negative.    Hematological: Negative.    Psychiatric/Behavioral: Negative.        I have reviewed and agree with the HPI, ROS, and historical information as entered above. Gregg De Leon MD    Objective   /78   Temp 97.3 °F (36.3 °C)   Wt 90.7 kg (200 lb)   Breastfeeding No   BMI 32.30 kg/m²     Physical Exam    Pelvis: Clinical staff was present for exam  External genitalia:  normal appearance of the external genitalia including Bartholin's and Covel's glands. No labial lesions.  Line of nondraining hidradenitis in right groin area       Assessment/Plan     Assessment     Problem List Items Addressed This Visit        Musculoskeletal and Integument    Hidradenitis suppurativa - Primary    Relevant Medications    mupirocin (Bactroban) 2 % ointment    tetracycline (ACHROMYCIN,SUMYCIN) 250 MG capsule    doxycycline (MONODOX) 100 MG capsule    Other Relevant Orders    Ambulatory Referral to Dermatology (Completed)        Hidradenitis suppurativa  1. Hidradenitis suppurativa; chronic recurrent; right groin area.  Fluctuates in severity but never resolves.  Treatment options reviewed including excision of affected area, suppressive antibiotics, and Humira.  Humira would have to be  prescribed by dermatology.  Patient already has appointment with dermatology for a mole check.    Plan     Return in about 3 months (around 12/10/2020) for Annual physical.  1. We will try suppressive therapy with doxycycline then daily tetracycline.  Keep appointment with dermatology to discuss possible Humira therapy.  2. Patient is due for annual exam and Pap smear in December      Gregg De Leon MD  09/10/2020

## 2020-09-10 NOTE — PROGRESS NOTES
Subjective   Nia Bah is a 40 y.o. female.     History of Present Illness   Low back pain  Started yesterday upon getting out of bed  Not able to get out of bed this morning  Usually Toradol injection will help  Heat and ice and Ibuprofen and muscle relaxant have not helped  Not sleeping due to pain    Hidrdenitis suppurative in groin area  Wants to start on Humera but the GYN does not prescribe this, she has an appointment with Dermatology but not until January,  She says she is supposed to be taking antibiotics but this tears up her stomach even more    The following portions of the patient's history were reviewed and updated as appropriate: allergies, current medications, past family history, past medical history, past social history, past surgical history and problem list.    Review of Systems   Musculoskeletal: Positive for back pain and myalgias.   Skin: Positive for skin lesions.   Psychiatric/Behavioral: Positive for sleep disturbance (due to pain').       Objective   Physical Exam   Constitutional: She is oriented to person, place, and time. She appears well-developed and well-nourished. No distress.   Cardiovascular: Normal rate, regular rhythm and normal heart sounds.   Pulmonary/Chest: Effort normal and breath sounds normal.   Musculoskeletal: Normal range of motion. She exhibits tenderness (midback pain ). She exhibits no edema or deformity.        Lumbar back: She exhibits tenderness.   Neurological: She is alert and oriented to person, place, and time.   Skin: Skin is warm and dry. Capillary refill takes less than 2 seconds. She is not diaphoretic.   Psychiatric: She has a normal mood and affect. Her behavior is normal. Judgment and thought content normal.   Nursing note and vitals reviewed.        Assessment/Plan   Nia was seen today for back pain.    Diagnoses and all orders for this visit:    Acute midline low back pain with left-sided sciatica  -     ketorolac (TORADOL) injection 60  mg  -     predniSONE (DELTASONE) 20 MG tablet; 1 tablet twice a day    will give pt Toradol injection and a few days of steroids to help with back pain

## 2020-09-21 ENCOUNTER — CONSULT (OUTPATIENT)
Dept: CARDIOLOGY | Facility: CLINIC | Age: 40
End: 2020-09-21

## 2020-09-21 VITALS
HEART RATE: 86 BPM | BODY MASS INDEX: 32.14 KG/M2 | DIASTOLIC BLOOD PRESSURE: 86 MMHG | SYSTOLIC BLOOD PRESSURE: 122 MMHG | HEIGHT: 66 IN | WEIGHT: 200 LBS

## 2020-09-21 DIAGNOSIS — I10 ESSENTIAL HYPERTENSION: Primary | ICD-10-CM

## 2020-09-21 DIAGNOSIS — R06.09 DOE (DYSPNEA ON EXERTION): ICD-10-CM

## 2020-09-21 PROCEDURE — 99243 OFF/OP CNSLTJ NEW/EST LOW 30: CPT | Performed by: INTERNAL MEDICINE

## 2020-09-21 PROCEDURE — 93000 ELECTROCARDIOGRAM COMPLETE: CPT | Performed by: INTERNAL MEDICINE

## 2020-09-21 RX ORDER — CLONIDINE HYDROCHLORIDE 0.1 MG/1
0.1 TABLET ORAL 3 TIMES DAILY PRN
Qty: 60 TABLET | Refills: 5 | Status: SHIPPED | OUTPATIENT
Start: 2020-09-21 | End: 2021-09-07 | Stop reason: SDUPTHER

## 2020-09-21 RX ORDER — AMLODIPINE BESYLATE 5 MG/1
5 TABLET ORAL DAILY
Qty: 30 TABLET | Refills: 11 | Status: SHIPPED | OUTPATIENT
Start: 2020-09-21 | End: 2021-04-16 | Stop reason: SDUPTHER

## 2020-09-21 NOTE — PROGRESS NOTES
Subjective:     Encounter Date:09/21/2020    Primary Care Physician: Santa Ray APRN      Patient ID: Nia Bah is a 40 y.o. female.    Chief Complaint:Hypertension    PROBLEM LIST:  1. Hypertension  a. 2014 normal echo  2. Tobacco abuse  3. Factor V Leiden   a. History of DVT in arm  4. Anxiety/depression  5. Seizure history  6. Chronic back pain  7. GERD  8. Vitamin D deficiency   9. History of nephrolithiasis  10. Chronic pancreatitis diagnosed 9/2020  a. Pancreatic exocrine insufficiency  11. Surgeries:  a. Cholecystectomy  b. Multiple hip surgeries  c. Total hysterectomy  d. Tubal ligation      No Known Allergies      Current Outpatient Medications:   •  amLODIPine-benazepril (Lotrel) 5-10 MG per capsule, Take 1 capsule by mouth Daily., Disp: 90 capsule, Rfl: 1  •  cyclobenzaprine (FLEXERIL) 10 MG tablet, TAKE 1 TABLET BY MOUTH 3 (THREE) TIMES A DAY AS NEEDED FOR MUSCLE SPASMS., Disp: 90 tablet, Rfl: 1  •  dicyclomine (BENTYL) 20 MG tablet, Take 1 tablet by mouth 4 (Four) Times a Day., Disp: 120 tablet, Rfl: 5  •  doxycycline (MONODOX) 100 MG capsule, Take 1 capsule by mouth 2 (Two) Times a Day for 14 days., Disp: 28 capsule, Rfl: 0  •  DULoxetine (CYMBALTA) 60 MG capsule, Take 1 capsule by mouth Daily., Disp: 30 capsule, Rfl: 5  •  fluticasone (FLONASE) 50 MCG/ACT nasal spray, 2 sprays into the nostril(s) as directed by provider Daily., Disp: 16 g, Rfl: 5  •  hydrOXYzine (ATARAX) 50 MG tablet, TAKE 1 TABLET BY MOUTH THREE TIMES A DAY, Disp: 90 tablet, Rfl: 5  •  ibuprofen (ADVIL,MOTRIN) 800 MG tablet, TAKE 1 TABLET BY MOUTH 3 (THREE) TIMES A DAY AS NEEDED FOR MILD PAIN OR MODERATE PAIN ., Disp: 90 tablet, Rfl: 1  •  levocetirizine (Xyzal) 5 MG tablet, Take 1 tablet by mouth Every Evening., Disp: 30 tablet, Rfl: 5  •  Multiple Vitamin (THERA-TABS) tablet, Take 1 tablet by mouth Every Morning., Disp: , Rfl: 0  •  Pancrelipase, Lip-Prot-Amyl, (Creon) 93597 units capsule delayed-release particles, Take  "36,000 units of lipase by mouth 2 (Two) Times a Day With Meals., Disp: 180 capsule, Rfl: 3  •  pantoprazole (Protonix) 40 MG EC tablet, Take 1 tablet by mouth Daily., Disp: 90 tablet, Rfl: 1  •  predniSONE (DELTASONE) 20 MG tablet, 1 tablet twice a day, Disp: 10 tablet, Rfl: 0  •  PREMARIN 0.625 MG/GM vaginal cream, , Disp: , Rfl:   •  prochlorperazine (COMPAZINE) 10 MG tablet, Take 1 tablet by mouth Every 6 (Six) Hours As Needed for Nausea or Vomiting., Disp: 30 tablet, Rfl: 0  •  promethazine (PHENERGAN) 25 MG tablet, Take 1 tablet by mouth Every 6 (Six) Hours As Needed for Nausea or Vomiting., Disp: 30 tablet, Rfl: 2  •  tetracycline (ACHROMYCIN,SUMYCIN) 250 MG capsule, Take 1 capsule by mouth 2 (Two) Times a Day for 30 days., Disp: 60 capsule, Rfl: 6  •  vitamin D (ERGOCALCIFEROL) 1.25 MG (81650 UT) capsule capsule, Take 1 capsule by mouth Every 7 (Seven) Days., Disp: 12 capsule, Rfl: 4  •  mupirocin (Bactroban) 2 % ointment, Apply  topically to the appropriate area as directed 3 (Three) Times a Day., Disp: 22 g, Rfl: 0  •  ondansetron ODT (ZOFRAN-ODT) 8 MG disintegrating tablet, PLACE 1 TABLET ON THE TONGUE EVERY 8 HOURS AS NEEDED FOR NAUSEA OR VOMITING, Disp: 30 tablet, Rfl: 0        History of Present Illness    Patient is a 40-year-old -American female who we are seeing today for further evaluation of labile blood pressure.  She has per report longstanding history of hypertension.  She is on Lotrel for this.  She notes that she cannot take this on a daily basis or else her blood pressure will drop into the 90s.  She feels very poorly with this.  Notes that her blood pressure at least twice a day will spike to about 200.  She denies any syncope but notes that whenever she is ambulating she feels very dizzy and if she is going to \"fall out\".  She has some chronic chest pain which is made worse with laying flat.  Denies any increase shortness of breath.  Denies any worsened edema.  Notes that she was in " her local ER last week and was diagnosed at that time with pancreatitis.  Due to poorly controlled blood pressure she has been referred here for further evaluation.  She was also recently started on steroid therapy due to a sweat gland issue.    The following portions of the patient's history were reviewed and updated as appropriate: allergies, current medications, past family history, past medical history, past social history, past surgical history and problem list.    Family History   Problem Relation Age of Onset   • Arthritis Mother    • Depression Mother    • Diabetes Mother    • Heart disease Mother    • Hyperlipidemia Mother    • Hypertension Mother    • Stroke Mother    • Arthritis Father    • Depression Father    • Hypertension Father    • Asthma Daughter    • Depression Daughter    • Asthma Son    • Depression Son    • Arthritis Maternal Grandmother    • Cancer Maternal Grandmother    • Depression Maternal Grandmother    • Diabetes Maternal Grandmother    • Depression Maternal Grandfather    • Arthritis Paternal Grandmother    • Depression Paternal Grandmother    • Depression Paternal Grandfather    • Arthritis Other    • Mental illness Other        Social History     Tobacco Use   • Smoking status: Current Every Day Smoker     Packs/day: 1.00     Types: Cigarettes   • Smokeless tobacco: Never Used   Substance Use Topics   • Alcohol use: Yes   • Drug use: No         Review of Systems   Constitution: Positive for malaise/fatigue and weight gain. Negative for fever.   HENT: Positive for hearing loss. Negative for nosebleeds.    Eyes: Positive for blurred vision. Negative for redness and visual disturbance.   Cardiovascular: Positive for chest pain. Negative for orthopnea, palpitations and paroxysmal nocturnal dyspnea.   Respiratory: Positive for snoring. Negative for cough, sputum production and wheezing.    Hematologic/Lymphatic: Negative for bleeding problem.   Skin: Negative for flushing, itching and  "rash.   Musculoskeletal: Positive for arthritis, muscle weakness and myalgias. Negative for falls, joint pain and muscle cramps.   Gastrointestinal: Positive for abdominal pain, heartburn, nausea and vomiting. Negative for diarrhea.   Genitourinary: Positive for urgency. Negative for hematuria.   Neurological: Positive for dizziness and headaches. Negative for excessive daytime sleepiness, tremors and weakness.   Psychiatric/Behavioral: Positive for depression and memory loss. Negative for substance abuse. The patient is nervous/anxious.           Objective:   /86 (BP Location: Right arm, Patient Position: Sitting)   Pulse 86   Ht 167.6 cm (66\")   Wt 90.7 kg (200 lb)   BMI 32.28 kg/m²         Vitals signs reviewed.   Constitutional:       Appearance: Healthy appearance. Well-developed and not in distress.   Eyes:      Conjunctiva/sclera: Conjunctivae normal.      Pupils: Pupils are equal, round, and reactive to light.   HENT:      Head: Normocephalic and atraumatic.    Mouth/Throat:      Pharynx: Oropharynx is clear.   Neck:      Thyroid: Thyroid normal. No thyromegaly.      Vascular: Normal carotid pulses. No carotid bruit or JVD. JVD normal.      Lymphadenopathy: No cervical adenopathy.   Pulmonary:      Effort: No respiratory distress.      Breath sounds: No wheezing. No rales.   Chest:      Chest wall: Not tender to palpatation.   Cardiovascular:      Normal rate. Regular rhythm.      No gallop.   Pulses:     Carotid: 2+ bilaterally.     Dorsalis pedis: 2+ bilaterally.     Posterior tibial: 2+ bilaterally.  Abdominal:      General: There is no distension or abdominal bruit.      Palpations: There is no abdominal mass.      Tenderness: There is no abdominal tenderness. There is no rebound.   Musculoskeletal:         General: No tenderness or deformity.      Extremities: No clubbing present.  Skin:     General: Skin is warm and dry. There is no cyanosis.      Findings: No rash.   Neurological:      " Mental Status: Alert, oriented to person, place, and time and oriented to person, place and time.           ECG 12 Lead    Date/Time: 9/21/2020 9:24 AM  Performed by: Mohsen Foley MD  Authorized by: Mohsen Foley MD   Comparison: not compared with previous ECG   Previous ECG: no previous ECG available  Rhythm: sinus rhythm    Clinical impression: abnormal EKG  Comments: Possible LAE                  Assessment:   Assessment/Plan      Nia was seen today for hypertension.    Diagnoses and all orders for this visit:    Essential hypertension  -     ECG 12 Lead    CLARK (dyspnea on exertion)      Hypertension, labile with intermittent hypotension.  2.  Dyspnea on exertion and atypical palpitations    Recommendations  1.  Patient has what appears to be well-controlled baseline hypertension with intermittent episodic spikes of unclear etiology.  2.  We will discontinue her Lotrel and continue the amlodipine 5 mg daily.  3.  Check echocardiogram  4.  If not already done, will perform 24-hour urinary catecholamines to rule out pheochromocytoma  5.  We will use clonidine 0.1 mg as needed severe episodic hypertensive spikes for now.       Laurence LEZAMA scribed portions of this dictation for Dr. Mohsen Foley.  I have seen and examined the patient, I have reviewed the note, discussed the case with the advance practice clinician, made necessary changes and I agree with the final note.    Mohsen Foley MD  09/21/20  12:17 EDT        Dictated utilizing Dragon dictation

## 2020-09-25 DIAGNOSIS — K86.1 IDIOPATHIC CHRONIC PANCREATITIS (HCC): ICD-10-CM

## 2020-09-25 DIAGNOSIS — R11.0 NAUSEA: ICD-10-CM

## 2020-09-29 DIAGNOSIS — K21.9 GASTROESOPHAGEAL REFLUX DISEASE WITHOUT ESOPHAGITIS: ICD-10-CM

## 2020-09-29 RX ORDER — PANTOPRAZOLE SODIUM 40 MG/1
TABLET, DELAYED RELEASE ORAL
Qty: 90 TABLET | Refills: 1 | Status: SHIPPED | OUTPATIENT
Start: 2020-09-29 | End: 2021-03-22

## 2020-09-29 RX ORDER — ONDANSETRON 8 MG/1
TABLET, ORALLY DISINTEGRATING ORAL
Qty: 30 TABLET | Refills: 0 | Status: SHIPPED | OUTPATIENT
Start: 2020-09-29 | End: 2021-07-09

## 2020-10-08 ENCOUNTER — TELEPHONE (OUTPATIENT)
Dept: FAMILY MEDICINE CLINIC | Facility: CLINIC | Age: 40
End: 2020-10-08

## 2020-10-08 NOTE — TELEPHONE ENCOUNTER
PATIENT CALLED TO LET JUAN MENDEZ KNOW THAT SHE IS IN [facility] ER. PATIENT STATED SHE IS HAVING PROBLEMS WITH STOMACH AGAIN.

## 2020-10-09 ENCOUNTER — TELEPHONE (OUTPATIENT)
Dept: FAMILY MEDICINE CLINIC | Facility: CLINIC | Age: 40
End: 2020-10-09

## 2020-10-09 NOTE — TELEPHONE ENCOUNTER
Patient called, stating she was seen at the [place] er last night for stomach issues and want to discuss her lab results. Please give patient a call

## 2020-10-09 NOTE — TELEPHONE ENCOUNTER
Pt was told sometime ago when she was diagnosed with pancreatitis that she had a cyst on her liver. When she was at the ER this past time they did another CT and told her she has two cysts on her liver. She wants to know did she have two the last time or is this new and getting worse?  She wants you to look a there CT scans (not labs).    Need to call Monday and have CT scan from [facility] faxed over for Santa to look at.

## 2020-10-09 NOTE — TELEPHONE ENCOUNTER
I can review her labs but she is seeing GI specialist about her stomach issues now not me. Yes please call for records. ajc

## 2020-10-20 ENCOUNTER — APPOINTMENT (OUTPATIENT)
Dept: CARDIOLOGY | Facility: HOSPITAL | Age: 40
End: 2020-10-20

## 2020-10-22 DIAGNOSIS — K86.1 IDIOPATHIC CHRONIC PANCREATITIS (HCC): ICD-10-CM

## 2020-10-22 DIAGNOSIS — M79.7 FIBROMYALGIA: ICD-10-CM

## 2020-10-23 RX ORDER — IBUPROFEN 800 MG/1
TABLET ORAL
Qty: 90 TABLET | Refills: 1 | Status: SHIPPED | OUTPATIENT
Start: 2020-10-23 | End: 2020-12-17

## 2020-11-04 ENCOUNTER — OFFICE VISIT (OUTPATIENT)
Dept: FAMILY MEDICINE CLINIC | Facility: CLINIC | Age: 40
End: 2020-11-04

## 2020-11-04 VITALS
HEART RATE: 80 BPM | DIASTOLIC BLOOD PRESSURE: 82 MMHG | BODY MASS INDEX: 30.89 KG/M2 | HEIGHT: 66 IN | WEIGHT: 192.2 LBS | SYSTOLIC BLOOD PRESSURE: 122 MMHG | RESPIRATION RATE: 16 BRPM | TEMPERATURE: 97.3 F

## 2020-11-04 DIAGNOSIS — R53.83 OTHER FATIGUE: ICD-10-CM

## 2020-11-04 DIAGNOSIS — F41.9 ANXIETY: ICD-10-CM

## 2020-11-04 DIAGNOSIS — E55.9 VITAMIN D DEFICIENCY: ICD-10-CM

## 2020-11-04 DIAGNOSIS — M25.50 POLYARTHRALGIA: ICD-10-CM

## 2020-11-04 DIAGNOSIS — G89.4 CHRONIC PAIN SYNDROME: Primary | ICD-10-CM

## 2020-11-04 PROCEDURE — 99214 OFFICE O/P EST MOD 30 MIN: CPT | Performed by: NURSE PRACTITIONER

## 2020-11-04 PROCEDURE — 96372 THER/PROPH/DIAG INJ SC/IM: CPT | Performed by: NURSE PRACTITIONER

## 2020-11-04 RX ORDER — KETOROLAC TROMETHAMINE 30 MG/ML
60 INJECTION, SOLUTION INTRAMUSCULAR; INTRAVENOUS ONCE
Status: COMPLETED | OUTPATIENT
Start: 2020-11-04 | End: 2020-11-04

## 2020-11-04 RX ADMIN — KETOROLAC TROMETHAMINE 60 MG: 30 INJECTION, SOLUTION INTRAMUSCULAR; INTRAVENOUS at 09:07

## 2020-11-04 NOTE — PROGRESS NOTES
Subjective   Nia Bah is a 40 y.o. female.     History of Present Illness   Flare up of intense pain for past 3 days   Knees, right shoulder and back  Taking Ibuprofen and Flexeril but not helping. Would like an injection of Toradol, has not had any NSAIDs this morning.  She has seen Rheumatology in the past and was told she had Fibromyalgia and some auto immune disorder.   She has tried various medications without improvement. Steroids help while she takes them but as soon as she stops she flares right back up  She has not tried making any dietary changes (eliminating sugar, wheat or gluten)     Chronic belly pain, bloating and nausea, decreased appetite   Mucus in bowel looks like jelly otherwise formed stool if she does not miss Creon  Dr Khan and Dr Guillen stomach doctors, feels like no one listens to her or has any help for her  Pin worms took treatment 3-4 months ago, took medication twice and took another OTC treatment no worms   Still having stomach pains still. Taking her Creon and Zofran as needed for nausea which she has daily, says she cannot eat anything without getting sick  She is down 8 lbs today.     The following portions of the patient's history were reviewed and updated as appropriate: allergies, current medications, past family history, past medical history, past social history, past surgical history and problem list.    Review of Systems   Constitutional: Positive for appetite change, fatigue and unexpected weight loss.   HENT: Negative.    Eyes: Negative.    Respiratory: Negative.  Negative for cough, chest tightness, shortness of breath and wheezing.    Cardiovascular: Negative.    Gastrointestinal: Positive for abdominal distention, abdominal pain, diarrhea, nausea and indigestion. Negative for vomiting.   Endocrine: Negative.    Genitourinary: Negative.    Musculoskeletal: Positive for arthralgias, back pain, gait problem (bilateral knee pain), joint swelling and myalgias.    Skin: Negative.    Allergic/Immunologic: Negative.  Negative for environmental allergies and food allergies.   Neurological: Negative for dizziness, weakness, light-headedness and numbness.   Hematological: Negative.    Psychiatric/Behavioral: Positive for sleep disturbance (due to chronic pain) and depressed mood (due to chronic pain in stomach and all over body).       Objective   Physical Exam  HENT:      Head: Normocephalic.      Right Ear: External ear normal.      Left Ear: External ear normal.      Nose: Nose normal.   Neck:      Musculoskeletal: Neck supple.   Cardiovascular:      Rate and Rhythm: Normal rate and regular rhythm.      Heart sounds: Normal heart sounds.   Pulmonary:      Effort: Pulmonary effort is normal.      Breath sounds: Normal breath sounds.   Abdominal:      Palpations: Abdomen is soft.      Tenderness: There is abdominal tenderness.   Musculoskeletal: Normal range of motion.         General: Tenderness present. No swelling or deformity.      Right lower leg: No edema.      Left lower leg: No edema.   Skin:     General: Skin is warm and dry.      Capillary Refill: Capillary refill takes less than 2 seconds.   Neurological:      Mental Status: She is alert and oriented to person, place, and time.   Psychiatric:         Mood and Affect: Mood is depressed.         Behavior: Behavior normal. Behavior is cooperative.         Thought Content: Thought content normal.         Judgment: Judgment normal.           Assessment/Plan   Diagnoses and all orders for this visit:    1. Chronic pain syndrome (Primary)  -     ketorolac (TORADOL) injection 60 mg    2. Polyarthralgia  -     C-reactive Protein  -     Sedimentation Rate  -     Rheumatoid Factor  -     BREANNE  -     Uric acid  -     Comprehensive Metabolic Panel  -     CBC & Differential    3. Vitamin D deficiency  -     Vitamin D 25 Hydroxy    4. Other fatigue  -     Vitamin B12    will check some labs and notify pt of results  Discussed making  some dietary changes (FODMAP diet or avoiding dairy and gluten containing foods) but says she cannot afford to purchase other food on her income.   Will give an injection of Toradol today in office  Discussed referral to [facility] or [facility] for second opinion regarding her GI issues and chronic pain. May need to get a referral from specialist not PCP and informed pt may not take her insurance. Pt aware and will get back to me on what I need to do

## 2020-11-05 DIAGNOSIS — E53.8 VITAMIN B12 DEFICIENCY: Primary | ICD-10-CM

## 2020-11-05 DIAGNOSIS — E55.9 VITAMIN D DEFICIENCY: ICD-10-CM

## 2020-11-05 LAB
25(OH)D3+25(OH)D2 SERPL-MCNC: 19.8 NG/ML (ref 30–100)
ALBUMIN SERPL-MCNC: 4.4 G/DL (ref 3.5–5.2)
ALBUMIN/GLOB SERPL: 1.8 G/DL
ALP SERPL-CCNC: 134 U/L (ref 39–117)
ALT SERPL-CCNC: 13 U/L (ref 1–33)
ANA SER QL: NEGATIVE
AST SERPL-CCNC: 14 U/L (ref 1–32)
BASOPHILS # BLD AUTO: 0.03 10*3/MM3 (ref 0–0.2)
BASOPHILS NFR BLD AUTO: 0.5 % (ref 0–1.5)
BILIRUB SERPL-MCNC: 0.2 MG/DL (ref 0–1.2)
BUN SERPL-MCNC: 8 MG/DL (ref 6–20)
BUN/CREAT SERPL: 7.4 (ref 7–25)
CALCIUM SERPL-MCNC: 10.1 MG/DL (ref 8.6–10.5)
CHLORIDE SERPL-SCNC: 102 MMOL/L (ref 98–107)
CO2 SERPL-SCNC: 31.7 MMOL/L (ref 22–29)
CREAT SERPL-MCNC: 1.08 MG/DL (ref 0.57–1)
CRP SERPL-MCNC: 0.28 MG/DL (ref 0–0.5)
EOSINOPHIL # BLD AUTO: 0.13 10*3/MM3 (ref 0–0.4)
EOSINOPHIL NFR BLD AUTO: 2 % (ref 0.3–6.2)
ERYTHROCYTE [DISTWIDTH] IN BLOOD BY AUTOMATED COUNT: 13.4 % (ref 12.3–15.4)
ERYTHROCYTE [SEDIMENTATION RATE] IN BLOOD BY WESTERGREN METHOD: 4 MM/HR (ref 0–20)
GLOBULIN SER CALC-MCNC: 2.4 GM/DL
GLUCOSE SERPL-MCNC: 110 MG/DL (ref 65–99)
HCT VFR BLD AUTO: 42 % (ref 34–46.6)
HGB BLD-MCNC: 13.8 G/DL (ref 12–15.9)
IMM GRANULOCYTES # BLD AUTO: 0.01 10*3/MM3 (ref 0–0.05)
IMM GRANULOCYTES NFR BLD AUTO: 0.2 % (ref 0–0.5)
LYMPHOCYTES # BLD AUTO: 2.65 10*3/MM3 (ref 0.7–3.1)
LYMPHOCYTES NFR BLD AUTO: 41 % (ref 19.6–45.3)
MCH RBC QN AUTO: 26.6 PG (ref 26.6–33)
MCHC RBC AUTO-ENTMCNC: 32.9 G/DL (ref 31.5–35.7)
MCV RBC AUTO: 80.9 FL (ref 79–97)
MONOCYTES # BLD AUTO: 0.44 10*3/MM3 (ref 0.1–0.9)
MONOCYTES NFR BLD AUTO: 6.8 % (ref 5–12)
NEUTROPHILS # BLD AUTO: 3.2 10*3/MM3 (ref 1.7–7)
NEUTROPHILS NFR BLD AUTO: 49.5 % (ref 42.7–76)
NRBC BLD AUTO-RTO: 0 /100 WBC (ref 0–0.2)
PLATELET # BLD AUTO: 358 10*3/MM3 (ref 140–450)
POTASSIUM SERPL-SCNC: 4.4 MMOL/L (ref 3.5–5.2)
PROT SERPL-MCNC: 6.8 G/DL (ref 6–8.5)
RBC # BLD AUTO: 5.19 10*6/MM3 (ref 3.77–5.28)
RHEUMATOID FACT SERPL-ACNC: <10 IU/ML (ref 0–13.9)
SODIUM SERPL-SCNC: 140 MMOL/L (ref 136–145)
URATE SERPL-MCNC: 4.9 MG/DL (ref 2.4–5.7)
VIT B12 SERPL-MCNC: 298 PG/ML (ref 211–946)
WBC # BLD AUTO: 6.46 10*3/MM3 (ref 3.4–10.8)

## 2020-11-05 RX ORDER — VITAMIN E (DL,TOCOPHERYL ACET) 180 MG
1000 CAPSULE ORAL DAILY
Qty: 90 TABLET | Refills: 3 | Status: SHIPPED | OUTPATIENT
Start: 2020-11-05

## 2020-11-05 RX ORDER — IMIPRAMINE HCL 25 MG
TABLET ORAL
Qty: 90 TABLET | Refills: 1 | Status: SHIPPED | OUTPATIENT
Start: 2020-11-05 | End: 2021-05-12

## 2020-11-05 RX ORDER — ERGOCALCIFEROL 1.25 MG/1
50000 CAPSULE ORAL
Qty: 12 CAPSULE | Refills: 4 | Status: SHIPPED | OUTPATIENT
Start: 2020-11-05 | End: 2021-03-10 | Stop reason: SDUPTHER

## 2020-11-12 ENCOUNTER — TELEPHONE (OUTPATIENT)
Dept: FAMILY MEDICINE CLINIC | Facility: CLINIC | Age: 40
End: 2020-11-12

## 2020-11-12 NOTE — TELEPHONE ENCOUNTER
LM informing pt that the only other thing Santa had to offer was possibly [facility] if she has never seen them before. (Per Santa)

## 2020-11-12 NOTE — TELEPHONE ENCOUNTER
PATIENT CALLED TO REPORT THAT THE [facility] DOES NOT ACCEPT HER INSURANCE. PATIENT STATES THAT SHE IS HURTING MORE AND MORE IN HER JOINTS AND BACK AND IT IS BECOMING HARD TO FUNCTION. PATIENT WOULD LIKE TO KNOW WHAT TO DO NEXT?     PLEASE CALL AND ADVISE: [phone]

## 2020-11-30 ENCOUNTER — TELEPHONE (OUTPATIENT)
Dept: FAMILY MEDICINE CLINIC | Facility: CLINIC | Age: 40
End: 2020-11-30

## 2020-12-03 ENCOUNTER — OFFICE VISIT (OUTPATIENT)
Dept: FAMILY MEDICINE CLINIC | Facility: CLINIC | Age: 40
End: 2020-12-03

## 2020-12-03 VITALS
SYSTOLIC BLOOD PRESSURE: 118 MMHG | BODY MASS INDEX: 31.24 KG/M2 | TEMPERATURE: 98 F | HEIGHT: 66 IN | WEIGHT: 194.4 LBS | DIASTOLIC BLOOD PRESSURE: 85 MMHG | HEART RATE: 76 BPM | RESPIRATION RATE: 24 BRPM

## 2020-12-03 DIAGNOSIS — T30.0 BURN BY HOT LIQUID: Primary | ICD-10-CM

## 2020-12-03 DIAGNOSIS — J18.9 WALKING PNEUMONIA: ICD-10-CM
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
## 2020-12-03 PROCEDURE — 99213 OFFICE O/P EST LOW 20 MIN: CPT | Performed by: NURSE PRACTITIONER

## 2020-12-03 RX ORDER — ALBUTEROL SULFATE 90 UG/1
2 AEROSOL, METERED RESPIRATORY (INHALATION) EVERY 4 HOURS PRN
Qty: 18 G | Refills: 0 | Status: SHIPPED | OUTPATIENT
Start: 2020-12-03 | End: 2022-05-12

## 2020-12-03 RX ORDER — DOXYCYCLINE 100 MG/1
100 CAPSULE ORAL 2 TIMES DAILY
Qty: 20 CAPSULE | Refills: 0 | Status: SHIPPED | OUTPATIENT
Start: 2020-12-03 | End: 2021-01-29
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
## 2020-12-11 ENCOUNTER — TELEPHONE (OUTPATIENT)
Dept: FAMILY MEDICINE CLINIC | Facility: CLINIC | Age: 40
End: 2020-12-11

## 2020-12-11 DIAGNOSIS — B37.9 YEAST INFECTION: Primary | ICD-10-CM

## 2020-12-11 RX ORDER — FLUCONAZOLE 150 MG/1
150 TABLET ORAL DAILY
Qty: 3 TABLET | Refills: 0 | Status: SHIPPED | OUTPATIENT
Start: 2020-12-11 | End: 2021-01-29
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
## 2020-12-17 DIAGNOSIS — J30.1 SEASONAL ALLERGIC RHINITIS DUE TO POLLEN: ICD-10-CM

## 2020-12-17 DIAGNOSIS — K86.1 IDIOPATHIC CHRONIC PANCREATITIS (HCC): ICD-10-CM

## 2020-12-17 DIAGNOSIS — Z91.09 ENVIRONMENTAL ALLERGIES: ICD-10-CM

## 2020-12-17 DIAGNOSIS — M79.7 FIBROMYALGIA: ICD-10-CM

## 2020-12-17 RX ORDER — LEVOCETIRIZINE DIHYDROCHLORIDE 5 MG/1
TABLET, FILM COATED ORAL
Qty: 30 TABLET | Refills: 5 | Status: SHIPPED | OUTPATIENT
Start: 2020-12-17 | End: 2021-03-14

## 2020-12-17 RX ORDER — IBUPROFEN 800 MG/1
TABLET ORAL
Qty: 90 TABLET | Refills: 1 | Status: SHIPPED | OUTPATIENT
Start: 2020-12-17 | End: 2022-03-04 | Stop reason: SDUPTHER

## 2021-01-29 ENCOUNTER — OFFICE VISIT (OUTPATIENT)
Dept: FAMILY MEDICINE CLINIC | Facility: CLINIC | Age: 41
End: 2021-01-29

## 2021-01-29 VITALS
DIASTOLIC BLOOD PRESSURE: 80 MMHG | BODY MASS INDEX: 31.82 KG/M2 | HEIGHT: 66 IN | WEIGHT: 198 LBS | TEMPERATURE: 98.2 F | HEART RATE: 78 BPM | RESPIRATION RATE: 18 BRPM | SYSTOLIC BLOOD PRESSURE: 110 MMHG

## 2021-01-29 DIAGNOSIS — J40 BRONCHITIS: Primary | ICD-10-CM

## 2021-01-29 DIAGNOSIS — R05.9 COUGH: ICD-10-CM

## 2021-01-29 DIAGNOSIS — M79.10 MYALGIA: ICD-10-CM

## 2021-01-29 LAB
EXPIRATION DATE: NORMAL
FLUAV AG NPH QL: NEGATIVE
FLUBV AG NPH QL: NEGATIVE
INTERNAL CONTROL: NORMAL
Lab: NORMAL

## 2021-01-29 PROCEDURE — 87804 INFLUENZA ASSAY W/OPTIC: CPT | Performed by: FAMILY MEDICINE

## 2021-01-29 PROCEDURE — 99213 OFFICE O/P EST LOW 20 MIN: CPT | Performed by: FAMILY MEDICINE

## 2021-01-29 RX ORDER — AZITHROMYCIN 250 MG/1
TABLET, FILM COATED ORAL
Qty: 6 TABLET | Refills: 0 | Status: SHIPPED | OUTPATIENT
Start: 2021-01-29 | End: 2021-02-19

## 2021-01-29 RX ORDER — DEXTROMETHORPHAN HYDROBROMIDE AND PROMETHAZINE HYDROCHLORIDE 15; 6.25 MG/5ML; MG/5ML
5 SYRUP ORAL 4 TIMES DAILY PRN
Qty: 180 ML | Refills: 0 | Status: SHIPPED | OUTPATIENT
Start: 2021-01-29 | End: 2021-02-19
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
## 2021-01-30 LAB — SARS-COV-2 RNA RESP QL NAA+PROBE: NOT DETECTED

## 2021-02-19 ENCOUNTER — OFFICE VISIT (OUTPATIENT)
Dept: FAMILY MEDICINE CLINIC | Facility: CLINIC | Age: 41
End: 2021-02-19

## 2021-02-19 VITALS
HEIGHT: 66 IN | HEART RATE: 83 BPM | OXYGEN SATURATION: 99 % | RESPIRATION RATE: 24 BRPM | TEMPERATURE: 97.7 F | WEIGHT: 196.2 LBS | DIASTOLIC BLOOD PRESSURE: 80 MMHG | SYSTOLIC BLOOD PRESSURE: 130 MMHG | BODY MASS INDEX: 31.53 KG/M2

## 2021-02-19 DIAGNOSIS — G89.4 CHRONIC PAIN SYNDROME: ICD-10-CM

## 2021-02-19 DIAGNOSIS — R30.0 DYSURIA: Primary | ICD-10-CM

## 2021-02-19 DIAGNOSIS — M25.50 POLYARTHRALGIA: ICD-10-CM

## 2021-02-19 DIAGNOSIS — R82.90 BAD ODOR OF URINE: ICD-10-CM

## 2021-02-19 DIAGNOSIS — M79.7 FIBROMYALGIA: ICD-10-CM

## 2021-02-19 LAB
BILIRUB BLD-MCNC: NEGATIVE MG/DL
CLARITY, POC: CLEAR
COLOR UR: YELLOW
GLUCOSE UR STRIP-MCNC: NEGATIVE MG/DL
KETONES UR QL: NEGATIVE
LEUKOCYTE EST, POC: NEGATIVE
NITRITE UR-MCNC: NEGATIVE MG/ML
PH UR: 6 [PH] (ref 5–8)
PROT UR STRIP-MCNC: NEGATIVE MG/DL
RBC # UR STRIP: NEGATIVE /UL
SP GR UR: 1.03 (ref 1–1.03)
UROBILINOGEN UR QL: NORMAL

## 2021-02-19 PROCEDURE — 99214 OFFICE O/P EST MOD 30 MIN: CPT | Performed by: NURSE PRACTITIONER

## 2021-02-19 PROCEDURE — 96372 THER/PROPH/DIAG INJ SC/IM: CPT | Performed by: NURSE PRACTITIONER

## 2021-02-19 RX ORDER — NITROFURANTOIN 25; 75 MG/1; MG/1
100 CAPSULE ORAL 2 TIMES DAILY
Qty: 14 CAPSULE | Refills: 0 | Status: SHIPPED | OUTPATIENT
Start: 2021-02-19 | End: 2021-03-11

## 2021-02-19 RX ORDER — KETOROLAC TROMETHAMINE 30 MG/ML
60 INJECTION, SOLUTION INTRAMUSCULAR; INTRAVENOUS ONCE
Status: COMPLETED | OUTPATIENT
Start: 2021-02-19 | End: 2021-02-19

## 2021-02-19 RX ORDER — CYCLOBENZAPRINE HCL 10 MG
10 TABLET ORAL 3 TIMES DAILY PRN
Qty: 90 TABLET | Refills: 1 | Status: SHIPPED | OUTPATIENT
Start: 2021-02-19 | End: 2021-07-23

## 2021-02-19 RX ADMIN — KETOROLAC TROMETHAMINE 60 MG: 30 INJECTION, SOLUTION INTRAMUSCULAR; INTRAVENOUS at 12:27

## 2021-02-19 NOTE — PROGRESS NOTES
Chief Complaint  leg, back & arm pain (for the past week. Pt is out of muscle relaxers and is taking Ibuprofen )    Subjective          Nia Bah presents to [facility]  History of Present Illness  Chronic pain all over in legs and arms mostly, sometimes low back. Having trouble walking so she is using a cane today.   She is needing a refill in muscle relaxants. No Ibuprofen today  She was in pain management for 10 years on Percocet but failed a drug screen about a year ago. Did not stay in treatment. Has never been in a MAT program with Suboxone  Has been evaluated for her pain at rheumatology in the past but they did not really listen to her, they did not run any other tests or offer her any treatment; she feels very frustrated and does not feel that people care to help her.  She constantly feels pain daily and has for years. Cold weather makes it worse. She has stiffness of her joints in her hands and decrease  strength at times. She feels depressed because she never feels good.    Thinks she might have a UTI  Having a strong urine odor and frequency to urinate for the past several days  She drank a lot of water this morning     She is volunteering at the homeless shelter for [facility]    Review of Systems   Constitutional: Positive for activity change and fatigue.   HENT: Positive for congestion, postnasal drip and rhinorrhea.    Eyes: Negative.    Respiratory: Negative.    Cardiovascular: Negative.    Gastrointestinal: Negative for diarrhea, nausea and vomiting.   Endocrine: Positive for cold intolerance. Negative for polydipsia, polyphagia and polyuria.   Genitourinary: Positive for frequency and urgency. Negative for dysuria.   Musculoskeletal: Positive for arthralgias, back pain, joint swelling, myalgias and neck stiffness.   Skin: Negative.    Allergic/Immunologic: Negative.    Neurological: Negative.    Psychiatric/Behavioral: Positive for dysphoric mood and  "sleep disturbance.     Objective   Vital Signs:   /80   Pulse 83   Temp 97.7 °F (36.5 °C)   Resp 24   Ht 167.6 cm (65.98\")   Wt 89 kg (196 lb 3.2 oz)   SpO2 99%   BMI 31.68 kg/m²     Physical Exam  Vitals signs and nursing note reviewed.   Constitutional:       General: She is in acute distress.      Appearance: Normal appearance. She is well-developed.   HENT:      Head: Normocephalic.      Right Ear: External ear normal.      Left Ear: External ear normal.      Nose: Nose normal.   Eyes:      General: Lids are normal.   Neck:      Musculoskeletal: Neck supple. Muscular tenderness present.   Cardiovascular:      Rate and Rhythm: Normal rate and regular rhythm.      Pulses: Normal pulses.      Heart sounds: Normal heart sounds, S1 normal and S2 normal.   Pulmonary:      Effort: Pulmonary effort is normal.      Breath sounds: Normal breath sounds.   Abdominal:      General: Bowel sounds are normal. There is no distension.      Palpations: Abdomen is soft.      Tenderness: There is no abdominal tenderness. There is no right CVA tenderness, left CVA tenderness or guarding.   Musculoskeletal:         General: Tenderness present. No deformity.      Lumbar back: She exhibits decreased range of motion, tenderness, pain and spasm.      Right lower leg: No edema.      Left lower leg: No edema.   Skin:     General: Skin is warm and dry.      Capillary Refill: Capillary refill takes less than 2 seconds.   Neurological:      Mental Status: She is alert and oriented to person, place, and time.      Sensory: No sensory deficit.      Motor: No abnormal muscle tone.      Deep Tendon Reflexes: Reflexes normal.   Psychiatric:         Mood and Affect: Mood is depressed. Affect is tearful.         Speech: Speech normal.         Behavior: Behavior normal.         Thought Content: Thought content normal.         Cognition and Memory: Cognition normal.         Judgment: Judgment normal.        Result Review :               "   Assessment and Plan    Diagnoses and all orders for this visit:    1. Dysuria (Primary)  -     POCT urinalysis dipstick, automated  -     Urine Culture - Urine, Urine, Clean Catch    2. Chronic pain syndrome  -     Ambulatory Referral to Rheumatology  -     ketorolac (TORADOL) injection 60 mg    3. Polyarthralgia  -     Ambulatory Referral to Rheumatology    4. Fibromyalgia  -     cyclobenzaprine (FLEXERIL) 10 MG tablet; Take 1 tablet by mouth 3 (Three) Times a Day As Needed for Muscle Spasms.  Dispense: 90 tablet; Refill: 1  -     Ambulatory Referral to Rheumatology    5. Bad odor of urine  -     nitrofurantoin, macrocrystal-monohydrate, (Macrobid) 100 MG capsule; Take 1 capsule by mouth 2 (Two) Times a Day.  Dispense: 14 capsule; Refill: 0        Follow Up   No follow-ups on file.  Patient was given instructions and counseling regarding her condition or for health maintenance advice. Please see specific information pulled into the AVS if appropriate.   UA negative, will send for urine culture and start her on Macrobid until culture returns  Will make a referral to Dr Freeman [facility] as a second opinion  Will give injection of Toradol today and refill cyclobenzaprine

## 2021-02-21 LAB
BACTERIA UR CULT: ABNORMAL
BACTERIA UR CULT: ABNORMAL
OTHER ANTIBIOTIC SUSC ISLT: ABNORMAL

## 2021-02-23 ENCOUNTER — TELEPHONE (OUTPATIENT)
Dept: FAMILY MEDICINE CLINIC | Facility: CLINIC | Age: 41
End: 2021-02-23

## 2021-02-23 NOTE — TELEPHONE ENCOUNTER
Caller: Nia Bah    Relationship: Self    Best call back number: [phone]    Caller requesting test results: YES    What test was performed: LAB    When was the test performed: 02/19    Where was the test performed: AT OFFICE    Additional notes:

## 2021-03-01 ENCOUNTER — TELEPHONE (OUTPATIENT)
Dept: FAMILY MEDICINE CLINIC | Facility: CLINIC | Age: 41
End: 2021-03-01

## 2021-03-01 DIAGNOSIS — R19.8 CHANGE IN BOWEL MOVEMENT: Primary | ICD-10-CM

## 2021-03-01 NOTE — TELEPHONE ENCOUNTER
Pt called in wanting to know if you could order a stool sample. She is having a lot of pain and is worried she might have some kind of parasite.  Please advise if you can order this for her.       Her stomach has been hurting for about 3-4 days. She has a jelly discharge is her bowel movements that concerns her.

## 2021-03-02 ENCOUNTER — LAB (OUTPATIENT)
Dept: FAMILY MEDICINE CLINIC | Facility: CLINIC | Age: 41
End: 2021-03-02

## 2021-03-02 DIAGNOSIS — R19.8 CHANGE IN BOWEL MOVEMENT: ICD-10-CM

## 2021-03-02 NOTE — TELEPHONE ENCOUNTER
Yes I can order stool study. Pt can come by to  the kit to collect stool at home and bring back to lab. nara

## 2021-03-09 ENCOUNTER — TELEPHONE (OUTPATIENT)
Dept: FAMILY MEDICINE CLINIC | Facility: CLINIC | Age: 41
End: 2021-03-09

## 2021-03-09 DIAGNOSIS — R30.0 DYSURIA: Primary | ICD-10-CM

## 2021-03-09 RX ORDER — CIPROFLOXACIN 500 MG/1
500 TABLET, FILM COATED ORAL 2 TIMES DAILY
Qty: 14 TABLET | Refills: 0 | Status: SHIPPED | OUTPATIENT
Start: 2021-03-09 | End: 2021-05-13

## 2021-03-09 NOTE — TELEPHONE ENCOUNTER
Will send in different antibiotic (cipro) to see if this helps with symptoms. If not better will refer to Urology.     Did she get any better but then the symptoms started again or never got any better on Macrobid? nara

## 2021-03-09 NOTE — TELEPHONE ENCOUNTER
PATIENT STATES THAT SHE IS STILL HAVING THE SAME ISSUES AS BEFORE. PATIENT STATES THAT SHE IS STILL HAVING STOMACH PROBLEMS, PAIN IN HER SIDES, FOUL SMELLS WHEN URINATING. PATIENT STATES THAT HER PAINS ARE GETTING WORSE AND WANTS A CALL ABOUT THIS.    PATIENT CALL BACK  [phone]

## 2021-03-10 ENCOUNTER — OFFICE VISIT (OUTPATIENT)
Dept: GASTROENTEROLOGY | Facility: CLINIC | Age: 41
End: 2021-03-10

## 2021-03-10 ENCOUNTER — LAB (OUTPATIENT)
Dept: LAB | Facility: HOSPITAL | Age: 41
End: 2021-03-10

## 2021-03-10 VITALS
SYSTOLIC BLOOD PRESSURE: 148 MMHG | HEIGHT: 66 IN | TEMPERATURE: 97.5 F | BODY MASS INDEX: 30.98 KG/M2 | WEIGHT: 192.8 LBS | OXYGEN SATURATION: 99 % | DIASTOLIC BLOOD PRESSURE: 90 MMHG | HEART RATE: 116 BPM

## 2021-03-10 DIAGNOSIS — R10.13 DYSPEPSIA: ICD-10-CM

## 2021-03-10 DIAGNOSIS — Z72.0 TOBACCO ABUSE: ICD-10-CM

## 2021-03-10 DIAGNOSIS — R11.0 NAUSEA: ICD-10-CM

## 2021-03-10 DIAGNOSIS — Q45.3 PANCREAS DIVISUM: ICD-10-CM

## 2021-03-10 DIAGNOSIS — R10.13 EPIGASTRIC PAIN: Primary | ICD-10-CM

## 2021-03-10 PROBLEM — R14.3 FLATULENCE: Status: ACTIVE | Noted: 2021-03-10

## 2021-03-10 PROBLEM — M54.9 BACK PAIN: Status: ACTIVE | Noted: 2021-03-10

## 2021-03-10 PROBLEM — R60.9 EDEMA: Status: ACTIVE | Noted: 2021-03-10

## 2021-03-10 PROBLEM — H53.8 BLURRING OF VISUAL IMAGE: Status: ACTIVE | Noted: 2021-03-10

## 2021-03-10 PROBLEM — R47.9 SPEECH DISTURBANCE: Status: ACTIVE | Noted: 2021-03-10

## 2021-03-10 PROBLEM — D68.51 FACTOR 5 LEIDEN MUTATION, HETEROZYGOUS: Status: ACTIVE | Noted: 2021-03-10

## 2021-03-10 PROBLEM — M25.559 ARTHRALGIA OF HIP: Status: ACTIVE | Noted: 2021-03-10

## 2021-03-10 PROBLEM — R79.89 LOW VITAMIN D LEVEL: Status: ACTIVE | Noted: 2021-03-10

## 2021-03-10 PROBLEM — R19.7 DIARRHEA: Status: ACTIVE | Noted: 2021-03-10

## 2021-03-10 PROBLEM — H60.522: Status: ACTIVE | Noted: 2021-03-10

## 2021-03-10 PROBLEM — L65.9 LOSS OF HAIR: Status: ACTIVE | Noted: 2021-03-10

## 2021-03-10 PROBLEM — G47.9 DYSSOMNIA: Status: ACTIVE | Noted: 2021-03-10

## 2021-03-10 PROBLEM — L23.7 CONTACT DERMATITIS DUE TO POISON IVY: Status: ACTIVE | Noted: 2021-03-10

## 2021-03-10 PROBLEM — R42 DIZZINESS: Status: ACTIVE | Noted: 2021-03-10

## 2021-03-10 PROBLEM — L29.9 ITCHING: Status: ACTIVE | Noted: 2021-03-10

## 2021-03-10 PROBLEM — T78.40XA ALLERGIC REACTION: Status: ACTIVE | Noted: 2021-03-10

## 2021-03-10 PROBLEM — R53.83 FATIGUE: Status: ACTIVE | Noted: 2021-03-10

## 2021-03-10 PROBLEM — R21 RASH: Status: ACTIVE | Noted: 2021-03-10

## 2021-03-10 PROBLEM — R51.9 HEADACHE: Status: ACTIVE | Noted: 2021-03-10

## 2021-03-10 PROBLEM — H66.90 ACUTE OTITIS MEDIA: Status: ACTIVE | Noted: 2021-03-10

## 2021-03-10 LAB
ADV 40+41 DNA STL QL NAA+NON-PROBE: NOT DETECTED
ASTRO TYP 1-8 RNA STL QL NAA+NON-PROBE: NOT DETECTED
C CAYETANENSIS DNA STL QL NAA+NON-PROBE: NOT DETECTED
C COLI+JEJ+UPSA DNA STL QL NAA+NON-PROBE: NOT DETECTED
C DIF TOX TCDA+TCDB STL QL NAA+NON-PROBE: NOT DETECTED
CRYPTOSP DNA STL QL NAA+NON-PROBE: NOT DETECTED
E COLI O157 DNA STL QL NAA+NON-PROBE: NORMAL
E HISTOLYT DNA STL QL NAA+NON-PROBE: NOT DETECTED
EAEC PAA PLAS AGGR+AATA ST NAA+NON-PRB: NOT DETECTED
EC STX1+STX2 GENES STL QL NAA+NON-PROBE: NOT DETECTED
EPEC EAE GENE STL QL NAA+NON-PROBE: NOT DETECTED
ETEC LTA+ST1A+ST1B TOX ST NAA+NON-PROBE: NOT DETECTED
G LAMBLIA DNA STL QL NAA+NON-PROBE: NOT DETECTED
NOROVIRUS GI+II RNA STL QL NAA+NON-PROBE: NOT DETECTED
P SHIGELLOIDES DNA STL QL NAA+NON-PROBE: NOT DETECTED
RVA RNA STL QL NAA+NON-PROBE: NOT DETECTED
S ENT+BONG DNA STL QL NAA+NON-PROBE: NOT DETECTED
SAPO I+II+IV+V RNA STL QL NAA+NON-PROBE: NOT DETECTED
SHIGELLA SP+EIEC IPAH ST NAA+NON-PROBE: NOT DETECTED
V CHOL+PARA+VUL DNA STL QL NAA+NON-PROBE: NOT DETECTED
V CHOLERAE DNA STL QL NAA+NON-PROBE: NOT DETECTED
Y ENTEROCOL DNA STL QL NAA+NON-PROBE: NOT DETECTED

## 2021-03-10 PROCEDURE — 36415 COLL VENOUS BLD VENIPUNCTURE: CPT | Performed by: INTERNAL MEDICINE

## 2021-03-10 PROCEDURE — 82150 ASSAY OF AMYLASE: CPT | Performed by: INTERNAL MEDICINE

## 2021-03-10 PROCEDURE — 99214 OFFICE O/P EST MOD 30 MIN: CPT | Performed by: INTERNAL MEDICINE

## 2021-03-10 PROCEDURE — 83690 ASSAY OF LIPASE: CPT | Performed by: INTERNAL MEDICINE

## 2021-03-10 RX ORDER — CETIRIZINE HYDROCHLORIDE 10 MG/1
10 TABLET ORAL DAILY
COMMUNITY
End: 2021-03-14

## 2021-03-10 RX ORDER — ERGOCALCIFEROL 1.25 MG/1
50000 CAPSULE ORAL
COMMUNITY
Start: 2020-11-05 | End: 2021-06-15 | Stop reason: SDUPTHER

## 2021-03-10 NOTE — PROGRESS NOTES
"Patient Name: Nia Bah   YOB: 1980   Medical Record number: [id number]     PCP: Santa Ray APRN    Chief Complaint   Patient presents with   • Follow-up     4 month    Upper stomach pain and some nausea.    History of Present Illness:   HPI  Ms. Bah presents to the office today.  The patient complains of some upper abdominal pain and bloating.  She will experience discomfort after meals.  Ms. Bah also has nausea.  She denies any difficult or painful swallowing.  There is no history of beverly heartburn.  Ms. Bah admits to some loose stool without nocturnal diarrhea.  There is no history of unexplained weight loss.  She denies night sweats, fever or chills.  The patient is going to see a urologist for recurrent UTI.  Past Medical History:   Diagnosis Date   • Acute nonintractable headache     nonspcecified headache type    • Acute otitis externa of left ear    • Acute otitis media    • Allergic    • Anxiety    • Back pain    • Blurry vision    • Contact dermatitis due to poison ivy    • Depression    • Diarrhea    • Dizziness    • Edema    • Factor V Leiden mutation (CMS/HCC)     \"FPC\"   • Fatigue    • Flatulence    • Fractures, compound     h/o    • Gastritis    • Gastroenteritis    • GERD (gastroesophageal reflux disease)    • H/O blood clots    • Hair loss    • History of cardiac disorder    • History of kidney infection    • History of seizure disorder    • History of seizures    • Hypertension    • Joint pain, hip    • Low serum vitamin D    • Miscarriage    • Nausea    • Otalgia of left ear    • Rash    • Renal calculi     personal h/o    • Screening breast examination     admits   • Speech complaints    • Vitamin D deficiency        Past Surgical History:   Procedure Laterality Date   • CHOLECYSTECTOMY     • HIP SURGERY      multiple since 5th grade x 12 replacements as well    • TOTAL ABDOMINAL HYSTERECTOMY WITH SALPINGO OOPHORECTOMY Bilateral 2005    menometorrhagia "   • TUBAL ABDOMINAL LIGATION           Current Outpatient Medications:   •  albuterol sulfate  (90 Base) MCG/ACT inhaler, Inhale 2 puffs Every 4 (Four) Hours As Needed for Wheezing or Shortness of Air., Disp: 18 g, Rfl: 0  •  amLODIPine (NORVASC) 5 MG tablet, Take 1 tablet by mouth Daily., Disp: 30 tablet, Rfl: 11  •  B-12 Methylcobalamin 1000 MCG tablet dispersible, Place 1,000 mg on the tongue Daily., Disp: 90 tablet, Rfl: 3  •  cetirizine (zyrTEC) 10 MG tablet, Take 10 mg by mouth Daily., Disp: , Rfl:   •  cyclobenzaprine (FLEXERIL) 10 MG tablet, Take 1 tablet by mouth 3 (Three) Times a Day As Needed for Muscle Spasms., Disp: 90 tablet, Rfl: 1  •  dicyclomine (BENTYL) 20 MG tablet, Take 1 tablet by mouth 4 (Four) Times a Day., Disp: 120 tablet, Rfl: 5  •  DULoxetine (CYMBALTA) 60 MG capsule, Take 1 capsule by mouth Daily., Disp: 30 capsule, Rfl: 5  •  ergocalciferol (ERGOCALCIFEROL) 1.25 MG (35284 UT) capsule, Take 50,000 Units by mouth., Disp: , Rfl:   •  fluticasone (FLONASE) 50 MCG/ACT nasal spray, 2 sprays into the nostril(s) as directed by provider Daily., Disp: 16 g, Rfl: 5  •  hydrOXYzine (ATARAX) 50 MG tablet, TAKE 1 TABLET BY MOUTH THREE TIMES A DAY, Disp: 90 tablet, Rfl: 5  •  ibuprofen (ADVIL,MOTRIN) 800 MG tablet, TAKE 1 TABLET BY MOUTH THREE TIMES A DAY AS NEEDED FOR MILD PAIN OR MODERATE PAIN, Disp: 90 tablet, Rfl: 1  •  levocetirizine (XYZAL) 5 MG tablet, TAKE 1 TABLET BY MOUTH EVERY DAY IN THE EVENING, Disp: 30 tablet, Rfl: 5  •  Multiple Vitamin (THERA-TABS) tablet, Take 1 tablet by mouth Every Morning., Disp: , Rfl: 0  •  ondansetron ODT (ZOFRAN-ODT) 8 MG disintegrating tablet, PLACE 1 TABLET ON THE TONGUE EVERY 8 HOURS AS NEEDED FOR NAUSEA OR VOMITING, Disp: 30 tablet, Rfl: 0  •  Pancrelipase, Lip-Prot-Amyl, (Creon) 26284 units capsule delayed-release particles, Take 36,000 units of lipase by mouth 2 (Two) Times a Day With Meals., Disp: 180 capsule, Rfl: 3  •  pantoprazole (PROTONIX) 40  MG EC tablet, TAKE 1 TABLET BY MOUTH EVERY DAY, Disp: 90 tablet, Rfl: 1  •  PREMARIN 0.625 MG/GM vaginal cream, , Disp: , Rfl:   •  prochlorperazine (COMPAZINE) 10 MG tablet, Take 1 tablet by mouth Every 6 (Six) Hours As Needed for Nausea or Vomiting., Disp: 30 tablet, Rfl: 0  •  promethazine (PHENERGAN) 25 MG tablet, Take 1 tablet by mouth Every 6 (Six) Hours As Needed for Nausea or Vomiting., Disp: 30 tablet, Rfl: 2  •  ciprofloxacin (Cipro) 500 MG tablet, Take 1 tablet by mouth 2 (Two) Times a Day., Disp: 14 tablet, Rfl: 0  •  cloNIDine (CATAPRES) 0.1 MG tablet, Take 1 tablet by mouth 3 (Three) Times a Day As Needed for High Blood Pressure., Disp: 60 tablet, Rfl: 5  •  Fluticasone Furoate-Vilanterol (BREO ELLIPTA) 100-25 MCG/INH inhaler, Inhale 1 puff Daily., Disp: 28 each, Rfl: 0  •  imipramine (TOFRANIL) 25 MG tablet, TAKE 1 TABLET BY MOUTH EVERY DAY, Disp: 90 tablet, Rfl: 1  •  mupirocin (Bactroban) 2 % ointment, Apply  topically to the appropriate area as directed 3 (Three) Times a Day., Disp: 22 g, Rfl: 0  •  nitrofurantoin, macrocrystal-monohydrate, (Macrobid) 100 MG capsule, Take 1 capsule by mouth 2 (Two) Times a Day., Disp: 14 capsule, Rfl: 0    No Known Allergies    Family History   Problem Relation Age of Onset   • Arthritis Mother    • Depression Mother    • Diabetes Mother    • Heart disease Mother    • Hyperlipidemia Mother    • Hypertension Mother    • Stroke Mother    • Arthritis Father    • Depression Father    • Hypertension Father    • Asthma Daughter    • Depression Daughter    • Asthma Son    • Depression Son    • Arthritis Maternal Grandmother    • Cancer Maternal Grandmother    • Depression Maternal Grandmother    • Diabetes Maternal Grandmother    • Depression Maternal Grandfather    • Arthritis Paternal Grandmother    • Depression Paternal Grandmother    • Depression Paternal Grandfather    • Arthritis Other    • Mental illness Other        Social History     Socioeconomic History   •  Marital status: Single     Spouse name: Not on file   • Number of children: Not on file   • Years of education: Not on file   • Highest education level: Not on file   Tobacco Use   • Smoking status: Current Every Day Smoker     Packs/day: 1.00     Types: Cigarettes   • Smokeless tobacco: Never Used   Substance and Sexual Activity   • Alcohol use: Not Currently   • Drug use: No   • Sexual activity: Defer       Review of Systems   Constitutional: Negative for activity change, appetite change, fatigue, fever and unexpected weight change.   HENT: Negative for dental problem, hearing loss, mouth sores, postnasal drip, sneezing, trouble swallowing and voice change.    Eyes: Negative for pain, redness, itching and visual disturbance.   Respiratory: Positive for cough, choking, chest tightness and shortness of breath. Negative for wheezing.    Cardiovascular: Negative for chest pain, palpitations and leg swelling.   Gastrointestinal: Positive for abdominal distention (bloating), abdominal pain, diarrhea and nausea. Negative for anal bleeding, blood in stool, constipation, rectal pain and vomiting.        Heartburn   Endocrine: Negative for cold intolerance, heat intolerance, polydipsia, polyphagia and polyuria.   Genitourinary: Negative.  Negative for dysuria, enuresis, flank pain, hematuria and urgency.   Musculoskeletal: Positive for back pain and gait problem (weak). Negative for arthralgias, joint swelling and myalgias.   Skin: Negative for color change, pallor and rash.   Allergic/Immunologic: Negative for environmental allergies, food allergies and immunocompromised state.   Neurological: Positive for weakness. Negative for dizziness, tremors, seizures, facial asymmetry, speech difficulty, numbness and headaches.   Hematological: Negative for adenopathy.   Psychiatric/Behavioral: Negative for behavioral problems, confusion, dysphoric mood, hallucinations and self-injury.       Vitals:    03/10/21 1501   BP: 148/90    Pulse: 116   Temp: 97.5 °F (36.4 °C)   SpO2: 99%       Physical Exam  Vitals reviewed.   Constitutional:       General: She is not in acute distress.     Appearance: Normal appearance.   HENT:      Head: Normocephalic and atraumatic.      Nose: Nose normal.      Mouth/Throat:      Mouth: Mucous membranes are moist.      Pharynx: Oropharynx is clear.   Eyes:      General: No scleral icterus.     Extraocular Movements: Extraocular movements intact.   Cardiovascular:      Rate and Rhythm: Normal rate and regular rhythm.      Heart sounds: No murmur. No gallop.    Pulmonary:      Effort: Pulmonary effort is normal.      Breath sounds: No wheezing or rales.   Abdominal:      General: Bowel sounds are normal.      Palpations: Abdomen is soft.      Tenderness: There is abdominal tenderness (epigastrium). There is no guarding.   Musculoskeletal:         General: No swelling. Normal range of motion.      Cervical back: Normal range of motion. No rigidity.   Skin:     General: Skin is warm.      Coloration: Skin is not jaundiced.   Neurological:      Mental Status: She is alert and oriented to person, place, and time.   Psychiatric:         Mood and Affect: Mood normal.         Thought Content: Thought content normal.         Judgment: Judgment normal.         Diagnoses and all orders for this visit:    1. Epigastric pain (Primary)  -     Amylase  -     Lipase  -     Esophagogastroduodenoscopy; Future    2. Dyspepsia  -     Amylase  -     Lipase  -     Esophagogastroduodenoscopy; Future    3. Nausea  -     Amylase  -     Lipase  -     Esophagogastroduodenoscopy; Future    4. Pancreas divisum    5. Tobacco abuse    The patient has complaints of chronic abdominal pain.  She relates more issues recently.  There are no alarm signs of weight loss or anemia.  She did have an MRI a couple years ago that suggested pancreas divisum.  The patient did have a normal fecal elastase.  There is a history of tobacco use and she has an  increased risk for peptic ulcer disease.      Plan: Will check amylase and lipase.           Will schedule EGD for further evaluation.

## 2021-03-11 ENCOUNTER — TELEPHONE (OUTPATIENT)
Dept: FAMILY MEDICINE CLINIC | Facility: CLINIC | Age: 41
End: 2021-03-11

## 2021-03-11 DIAGNOSIS — F11.93 OPIATE WITHDRAWAL (HCC): ICD-10-CM

## 2021-03-11 DIAGNOSIS — R11.0 NAUSEA: Primary | ICD-10-CM

## 2021-03-11 LAB
AMYLASE SERPL-CCNC: 120 U/L (ref 28–100)
LIPASE SERPL-CCNC: 163 U/L (ref 13–60)

## 2021-03-11 RX ORDER — PROMETHAZINE HYDROCHLORIDE 25 MG/1
25 TABLET ORAL EVERY 6 HOURS PRN
Qty: 30 TABLET | Refills: 1 | Status: SHIPPED | OUTPATIENT
Start: 2021-03-11 | End: 2021-10-19 | Stop reason: SDUPTHER

## 2021-03-11 RX ORDER — FLUCONAZOLE 150 MG/1
150 TABLET ORAL DAILY
Qty: 3 TABLET | Refills: 0 | Status: SHIPPED | OUTPATIENT
Start: 2021-03-11 | End: 2021-05-13

## 2021-03-11 NOTE — TELEPHONE ENCOUNTER
SHARON CALLED AND WOULD LIKE TO KNOW IF SHE CAN GET SOMETHING CALLED IN FOR A YEAST INFECTION, WHILE SHE'S TAKING HER ANABIOTICS. SHE WOULD LIKE FOR IT TO BE FOR MORE THAN ONCE DOSAGE.        CALL BACK NUMBER: [phone]

## 2021-03-12 ENCOUNTER — TELEPHONE (OUTPATIENT)
Dept: GASTROENTEROLOGY | Facility: CLINIC | Age: 41
End: 2021-03-12

## 2021-03-12 NOTE — TELEPHONE ENCOUNTER
----- Message from Taiwo Guillen MD sent at 3/11/2021  5:08 PM EST -----  Let the patient know the amylase and lipase were slightly elevated.  Will proceed on with the upper endoscopy.

## 2021-03-13 DIAGNOSIS — F11.93 OPIATE WITHDRAWAL (HCC): ICD-10-CM

## 2021-03-14 RX ORDER — HYDROXYZINE 50 MG/1
TABLET, FILM COATED ORAL
Qty: 90 TABLET | Refills: 5 | Status: SHIPPED | OUTPATIENT
Start: 2021-03-14 | End: 2021-09-07 | Stop reason: SDUPTHER

## 2021-03-16 ENCOUNTER — APPOINTMENT (OUTPATIENT)
Dept: PREADMISSION TESTING | Facility: HOSPITAL | Age: 41
End: 2021-03-16

## 2021-03-18 ENCOUNTER — OUTSIDE FACILITY SERVICE (OUTPATIENT)
Dept: GASTROENTEROLOGY | Facility: CLINIC | Age: 41
End: 2021-03-18

## 2021-03-18 PROCEDURE — 43239 EGD BIOPSY SINGLE/MULTIPLE: CPT | Performed by: INTERNAL MEDICINE

## 2021-03-18 PROCEDURE — 88342 IMHCHEM/IMCYTCHM 1ST ANTB: CPT | Performed by: INTERNAL MEDICINE

## 2021-03-18 PROCEDURE — 88305 TISSUE EXAM BY PATHOLOGIST: CPT | Performed by: INTERNAL MEDICINE

## 2021-03-19 ENCOUNTER — LAB REQUISITION (OUTPATIENT)
Dept: LAB | Facility: HOSPITAL | Age: 41
End: 2021-03-19

## 2021-03-19 DIAGNOSIS — K30 FUNCTIONAL DYSPEPSIA: ICD-10-CM

## 2021-03-19 DIAGNOSIS — R11.0 NAUSEA: ICD-10-CM

## 2021-03-19 DIAGNOSIS — R10.13 EPIGASTRIC PAIN: ICD-10-CM

## 2021-03-21 DIAGNOSIS — K21.9 GASTROESOPHAGEAL REFLUX DISEASE WITHOUT ESOPHAGITIS: ICD-10-CM

## 2021-03-22 ENCOUNTER — TELEPHONE (OUTPATIENT)
Dept: GASTROENTEROLOGY | Facility: CLINIC | Age: 41
End: 2021-03-22

## 2021-03-22 RX ORDER — PANTOPRAZOLE SODIUM 40 MG/1
TABLET, DELAYED RELEASE ORAL
Qty: 90 TABLET | Refills: 1 | Status: SHIPPED | OUTPATIENT
Start: 2021-03-22 | End: 2021-03-23

## 2021-03-23 ENCOUNTER — TELEPHONE (OUTPATIENT)
Dept: GASTROENTEROLOGY | Facility: CLINIC | Age: 41
End: 2021-03-23

## 2021-03-23 DIAGNOSIS — K21.00 GASTROESOPHAGEAL REFLUX DISEASE WITH ESOPHAGITIS WITHOUT HEMORRHAGE: ICD-10-CM

## 2021-03-23 DIAGNOSIS — K29.00 OTHER ACUTE GASTRITIS WITHOUT HEMORRHAGE: Primary | ICD-10-CM

## 2021-03-23 LAB
CYTO UR: NORMAL
LAB AP CASE REPORT: NORMAL
LAB AP CLINICAL INFORMATION: NORMAL
PATH REPORT.FINAL DX SPEC: NORMAL
PATH REPORT.GROSS SPEC: NORMAL

## 2021-03-23 RX ORDER — DEXLANSOPRAZOLE 60 MG/1
60 CAPSULE, DELAYED RELEASE ORAL DAILY
Qty: 30 CAPSULE | Refills: 0 | Status: SHIPPED | OUTPATIENT
Start: 2021-03-23 | End: 2021-04-14

## 2021-03-23 NOTE — TELEPHONE ENCOUNTER
I called and spoke with Ms. Bah on the phone.  There was no evidence for H. pylori but some reactive gastropathy.  The patient does take nonsteroidal medication at times.  She is on Protonix but this medication is not helping.  I will prescribe Dexilant.

## 2021-04-03 DIAGNOSIS — K86.1 OTHER CHRONIC PANCREATITIS (HCC): ICD-10-CM

## 2021-04-05 RX ORDER — PANCRELIPASE 36000; 180000; 114000 [USP'U]/1; [USP'U]/1; [USP'U]/1
CAPSULE, DELAYED RELEASE PELLETS ORAL
Qty: 180 CAPSULE | Refills: 3 | Status: SHIPPED | OUTPATIENT
Start: 2021-04-05 | End: 2022-05-09

## 2021-04-06 ENCOUNTER — TELEPHONE (OUTPATIENT)
Dept: FAMILY MEDICINE CLINIC | Facility: CLINIC | Age: 41
End: 2021-04-06

## 2021-04-06 DIAGNOSIS — R30.0 DYSURIA: Primary | ICD-10-CM

## 2021-04-06 NOTE — TELEPHONE ENCOUNTER
Caller: Bladimir Bah    Relationship: Self    Best call back number: [phone]    What is the best time to reach you: ANY TIME    Who are you requesting to speak with (clinical staff, provider,  specific staff member): JUAN MENDEZ NURSE       What was the call regarding: PATIENT HAS AN UTI AND WAS OUT ON 2 DIFFERENT MEDICATIONS HOWEVER THEY DID NOT WORK AND SHE IS STILL HAVING SYMPTOMS AND COMPLICATIONS.    PATIENT NEEDS TO KNOW WHAT STEPS SHE NEEDS TO TAKE TO FULLY TREAT THE UTI. PLEASE ADVISE AND CALL PATIENT ASAP [phone]    Do you require a callback: YES, ASAP    [id number] - [place], [place] - [address] AT [place] [address] - [phone] [facility] [phone]   [phone]

## 2021-04-06 NOTE — TELEPHONE ENCOUNTER
Please call Parviz to see who treated her with a second medication and what was it? What symptoms is she having now? Will need to check a urine to see if she is still growing bacteria. Can she bring a urine by?

## 2021-04-07 NOTE — TELEPHONE ENCOUNTER
Spoke w/ pt and she she say's, you are who gave her the second medication. She called back and said, she was no better.     Pt will come by and drop off UA for culture later today.

## 2021-04-14 ENCOUNTER — TELEPHONE (OUTPATIENT)
Dept: FAMILY MEDICINE CLINIC | Facility: CLINIC | Age: 41
End: 2021-04-14

## 2021-04-14 DIAGNOSIS — R30.0 DYSURIA: Primary | ICD-10-CM

## 2021-04-14 DIAGNOSIS — K29.00 OTHER ACUTE GASTRITIS WITHOUT HEMORRHAGE: ICD-10-CM

## 2021-04-14 DIAGNOSIS — K21.00 GASTROESOPHAGEAL REFLUX DISEASE WITH ESOPHAGITIS WITHOUT HEMORRHAGE: ICD-10-CM

## 2021-04-14 RX ORDER — DEXLANSOPRAZOLE 60 MG/1
CAPSULE, DELAYED RELEASE ORAL
Qty: 30 CAPSULE | Refills: 0 | Status: SHIPPED | OUTPATIENT
Start: 2021-04-14 | End: 2021-05-24

## 2021-04-14 NOTE — TELEPHONE ENCOUNTER
Caller: Bladimir Bah    Relationship: Self    Best call back number: [phone]  What is the best time to reach you: ANYTIME    Who are you requesting to speak with (clinical staff, provider,  specific staff member): CLINICAL STAFF     Do you know the name of the person who called:     What was the call regarding: BLOOD PRESSURE MEDICINE NOT WORKING/ ELEVATED BLOOD PRESSURE    Do you require a callback: YES

## 2021-04-15 NOTE — TELEPHONE ENCOUNTER
Patient said she is only taking the clonidine 0.1mg. She doesn't know of any other meds she has been on. Not taking the amlodipine, does not think she has any of that at home. Advised patient that Santa is gone for the day. Patient advised to report to ER if she has chest pain, shortness of breath, Nausea/vomiting, arm or jaw pain or any other concerning symptoms or increasing BP. Patient agrees.

## 2021-04-15 NOTE — TELEPHONE ENCOUNTER
PATIENT STATES THE HIGHEST READIN/120 AND STATES ALL READINGS WERE AROUND THIS. PATIENT REQUESTED A CALL BACK.  CALL BACK [phone]

## 2021-04-15 NOTE — TELEPHONE ENCOUNTER
Parviz has Clonidine 0.1 mg on her medication list, is she on this medication? And has she been taking her Amlodipine 5 mg daily?   Has she ever been on any other BP medications?

## 2021-04-16 RX ORDER — AMLODIPINE BESYLATE 5 MG/1
5 TABLET ORAL DAILY
Qty: 90 TABLET | Refills: 3 | Status: SHIPPED | OUTPATIENT
Start: 2021-04-16 | End: 2021-09-07 | Stop reason: SDUPTHER

## 2021-04-29 ENCOUNTER — TELEPHONE (OUTPATIENT)
Dept: FAMILY MEDICINE CLINIC | Facility: CLINIC | Age: 41
End: 2021-04-29

## 2021-04-29 NOTE — TELEPHONE ENCOUNTER
Caller: Bladimir Bah    Relationship: Self    What was the call regarding: PATIENT CALLED STATING THAT SHE IS IN PAIN WITH HER FIBROMYALGIA.  PATIENT WOULD LIKE TO COME IN TO BE SEEN TOMORROW.     Do you require a callback: YES

## 2021-05-10 ENCOUNTER — TELEPHONE (OUTPATIENT)
Dept: FAMILY MEDICINE CLINIC | Facility: CLINIC | Age: 41
End: 2021-05-10

## 2021-05-10 NOTE — TELEPHONE ENCOUNTER
Caller: Bladimir Bah    Relationship: Self    Best call back number: [phone]    What is the best time to reach you: N/A    Who are you requesting to speak with (clinical staff, provider,  specific staff member): CLINICAL STAFF MEMBER     Do you know the name of the person who called: N/A    What was the call regarding: PATIENT WOULD LIKE A CALL BACK REGARDING PAIN SHE IS HAVING.     Do you require a callback: YES

## 2021-05-12 DIAGNOSIS — F41.9 ANXIETY: ICD-10-CM

## 2021-05-12 RX ORDER — IMIPRAMINE HCL 25 MG
TABLET ORAL
Qty: 90 TABLET | Refills: 1 | Status: SHIPPED | OUTPATIENT
Start: 2021-05-12 | End: 2021-09-07 | Stop reason: SDUPTHER

## 2021-05-13 ENCOUNTER — TELEPHONE (OUTPATIENT)
Dept: FAMILY MEDICINE CLINIC | Facility: CLINIC | Age: 41
End: 2021-05-13

## 2021-05-13 ENCOUNTER — OFFICE VISIT (OUTPATIENT)
Dept: FAMILY MEDICINE CLINIC | Facility: CLINIC | Age: 41
End: 2021-05-13

## 2021-05-13 VITALS
BODY MASS INDEX: 31.82 KG/M2 | HEIGHT: 66 IN | WEIGHT: 198 LBS | DIASTOLIC BLOOD PRESSURE: 72 MMHG | RESPIRATION RATE: 18 BRPM | TEMPERATURE: 98.2 F | HEART RATE: 76 BPM | SYSTOLIC BLOOD PRESSURE: 112 MMHG

## 2021-05-13 DIAGNOSIS — G89.4 CHRONIC PAIN SYNDROME: ICD-10-CM

## 2021-05-13 DIAGNOSIS — M25.50 POLYARTHRALGIA: Primary | ICD-10-CM

## 2021-05-13 DIAGNOSIS — M25.471 ANKLE EDEMA, BILATERAL: ICD-10-CM

## 2021-05-13 DIAGNOSIS — M25.472 ANKLE EDEMA, BILATERAL: ICD-10-CM

## 2021-05-13 PROCEDURE — 96372 THER/PROPH/DIAG INJ SC/IM: CPT | Performed by: NURSE PRACTITIONER

## 2021-05-13 PROCEDURE — 99213 OFFICE O/P EST LOW 20 MIN: CPT | Performed by: NURSE PRACTITIONER

## 2021-05-13 RX ORDER — PREDNISONE 20 MG/1
TABLET ORAL
Qty: 10 TABLET | Refills: 0 | Status: SHIPPED | OUTPATIENT
Start: 2021-05-13 | End: 2021-06-11

## 2021-05-13 RX ORDER — KETOROLAC TROMETHAMINE 30 MG/ML
60 INJECTION, SOLUTION INTRAMUSCULAR; INTRAVENOUS ONCE
Status: COMPLETED | OUTPATIENT
Start: 2021-05-13 | End: 2021-05-13

## 2021-05-13 RX ORDER — LEVOCETIRIZINE DIHYDROCHLORIDE 5 MG/1
5 TABLET, FILM COATED ORAL EVERY EVENING
COMMUNITY
Start: 2021-04-26 | End: 2022-04-27 | Stop reason: SDUPTHER

## 2021-05-13 RX ORDER — OMEGA-3/DHA/EPA/FISH OIL 35-113.5MG
TABLET,CHEWABLE ORAL
COMMUNITY
Start: 2021-02-12 | End: 2021-05-13 | Stop reason: ALTCHOICE

## 2021-05-13 RX ORDER — TRAZODONE HYDROCHLORIDE 50 MG/1
TABLET ORAL
COMMUNITY
Start: 2021-02-12 | End: 2021-05-13 | Stop reason: ALTCHOICE

## 2021-05-13 RX ADMIN — KETOROLAC TROMETHAMINE 60 MG: 30 INJECTION, SOLUTION INTRAMUSCULAR; INTRAVENOUS at 10:49

## 2021-05-13 NOTE — TELEPHONE ENCOUNTER
Caller: Bladimir Bah    Relationship: Self    Best call back number: [phone]    What form or medical record are you requesting: LETTER    Who is requesting this form or medical record from you: PROVIDER    How would you like to receive the form or medical records (pick-up, mail, fax): OTF     Timeframe paperwork needed: ASAP    Additional notes: PATIENT STATES THAT HER JOB IS REQUIRING THAT SHE HAVE THE COVID VACCINE AND SHE STATES THAT HER PROVIDER DOES NOT RECOMMEND THAT SHE GET ONE. SHE WOULD LIKE A LETTER SENT TO HER MYCHART SO THAT SHE MAY GIVE IT TO HER EMPLOYER SO SHE CAN BE EXCUSED FROM RECEIVING ONE.

## 2021-05-22 DIAGNOSIS — K29.00 OTHER ACUTE GASTRITIS WITHOUT HEMORRHAGE: ICD-10-CM

## 2021-05-22 DIAGNOSIS — K21.00 GASTROESOPHAGEAL REFLUX DISEASE WITH ESOPHAGITIS WITHOUT HEMORRHAGE: ICD-10-CM

## 2021-05-24 RX ORDER — DEXLANSOPRAZOLE 60 MG/1
CAPSULE, DELAYED RELEASE ORAL
Qty: 30 CAPSULE | Refills: 0 | Status: SHIPPED | OUTPATIENT
Start: 2021-05-24 | End: 2021-07-12

## 2021-06-04 NOTE — TELEPHONE ENCOUNTER
----- Message from Reanna Perla sent at 1/2/2019  3:29 PM EST -----  Contact: JUAN / PT CALL  THE FOLLOWING MSG WAS GIVEN TO PATIENT   Labs are normal, negative for bacteria or parasite or H pylori. [facility]    ALSO THE PATIENT WAS SEEN IN ER AND A CT WAS PERFORMED.  ER DR STATED THAT SHE DOES STILL SHOW SOME COLLITIS AND WANTED HER TO CONTINUE ANTIBIOTICS.  SHE IS STILL FEELING POORLY AND HAS A FOLLOW UP APPT WITH DR RODRIGUEZ ON 1/8/2019.  PT WANTED JUAN TO BE ADVISED AS TO WHAT WAS GOING ON.    PT CALL BACK [phone]   Called and spoke with Deyanira. She would not let me initial or schedule the peer to peer as she only wanted to discuss with Dr. Oconnor.     Dr. Oconnor, please call [phone], press 2 and discuss case with nurse.   Once authorization is received, must route back to [facility] Pool  ([id number])

## 2021-06-11 ENCOUNTER — OFFICE VISIT (OUTPATIENT)
Dept: FAMILY MEDICINE CLINIC | Facility: CLINIC | Age: 41
End: 2021-06-11

## 2021-06-11 ENCOUNTER — LAB (OUTPATIENT)
Dept: FAMILY MEDICINE CLINIC | Facility: CLINIC | Age: 41
End: 2021-06-11

## 2021-06-11 VITALS — WEIGHT: 202 LBS | TEMPERATURE: 97.7 F | BODY MASS INDEX: 32.47 KG/M2 | HEIGHT: 66 IN | RESPIRATION RATE: 18 BRPM

## 2021-06-11 DIAGNOSIS — E53.8 VITAMIN B12 DEFICIENCY: ICD-10-CM

## 2021-06-11 DIAGNOSIS — E78.2 MIXED HYPERLIPIDEMIA: ICD-10-CM

## 2021-06-11 DIAGNOSIS — E55.9 VITAMIN D DEFICIENCY: ICD-10-CM

## 2021-06-11 DIAGNOSIS — R41.3 MEMORY LOSS: ICD-10-CM

## 2021-06-11 DIAGNOSIS — Z82.49 FAMILY HISTORY OF EARLY CAD: ICD-10-CM

## 2021-06-11 DIAGNOSIS — M79.10 MYALGIA: ICD-10-CM

## 2021-06-11 DIAGNOSIS — F41.9 ANXIETY: ICD-10-CM

## 2021-06-11 DIAGNOSIS — R07.89 OTHER CHEST PAIN: Primary | ICD-10-CM

## 2021-06-11 DIAGNOSIS — G40.909 SEIZURE DISORDER (HCC): ICD-10-CM

## 2021-06-11 PROCEDURE — 99214 OFFICE O/P EST MOD 30 MIN: CPT | Performed by: NURSE PRACTITIONER

## 2021-06-11 RX ORDER — MAGNESIUM OXIDE 400 MG/1
400 TABLET ORAL 2 TIMES DAILY
Qty: 60 TABLET | Refills: 1 | Status: SHIPPED | OUTPATIENT
Start: 2021-06-11 | End: 2022-02-23
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
## 2021-06-15 DIAGNOSIS — E78.2 MIXED HYPERLIPIDEMIA: Primary | ICD-10-CM

## 2021-06-15 DIAGNOSIS — E55.9 VITAMIN D DEFICIENCY: ICD-10-CM

## 2021-06-15 DIAGNOSIS — R79.82 CRP ELEVATED: ICD-10-CM

## 2021-06-15 DIAGNOSIS — Z82.49 FAMILY HISTORY OF ASCVD (ARTERIOSCLEROTIC CARDIOVASCULAR DISEASE): ICD-10-CM

## 2021-06-15 LAB
25(OH)D3+25(OH)D2 SERPL-MCNC: 33.6 NG/ML (ref 30–100)
APO B SERPL-MCNC: 133 MG/DL
BUN SERPL-MCNC: 11 MG/DL (ref 6–24)
BUN/CREAT SERPL: 11 (ref 9–23)
CALCIUM SERPL-MCNC: 10 MG/DL (ref 8.7–10.2)
CHLORIDE SERPL-SCNC: 101 MMOL/L (ref 96–106)
CHOLEST SERPL-MCNC: 219 MG/DL (ref 100–199)
CO2 SERPL-SCNC: 26 MMOL/L (ref 20–29)
CREAT SERPL-MCNC: 0.99 MG/DL (ref 0.57–1)
CRP SERPL HS-MCNC: 5.91 MG/L (ref 0–3)
D DIMER PPP FEU-MCNC: 0.34 MG/L FEU (ref 0–0.49)
GLUCOSE SERPL-MCNC: 86 MG/DL (ref 65–99)
HDLC SERPL-MCNC: 40 MG/DL
LDLC SERPL CALC-MCNC: 156 MG/DL (ref 0–99)
MAGNESIUM RBC-MCNC: 4.6 MG/DL (ref 4.2–6.8)
POTASSIUM SERPL-SCNC: 4.6 MMOL/L (ref 3.5–5.2)
SODIUM SERPL-SCNC: 141 MMOL/L (ref 134–144)
TRIGL SERPL-MCNC: 124 MG/DL (ref 0–149)
VIT B12 SERPL-MCNC: 1059 PG/ML (ref 232–1245)
VLDLC SERPL CALC-MCNC: 23 MG/DL (ref 5–40)

## 2021-06-15 RX ORDER — ERGOCALCIFEROL 1.25 MG/1
50000 CAPSULE ORAL WEEKLY
Qty: 5 CAPSULE | Refills: 11 | Status: SHIPPED | OUTPATIENT
Start: 2021-06-15

## 2021-06-15 RX ORDER — ROSUVASTATIN CALCIUM 5 MG/1
5 TABLET, COATED ORAL DAILY
Qty: 90 TABLET | Refills: 1 | Status: SHIPPED | OUTPATIENT
Start: 2021-06-15 | End: 2021-09-07 | Stop reason: SDUPTHER

## 2021-06-16 ENCOUNTER — TELEPHONE (OUTPATIENT)
Dept: FAMILY MEDICINE CLINIC | Facility: CLINIC | Age: 41
End: 2021-06-16

## 2021-06-16 NOTE — TELEPHONE ENCOUNTER
Caller: Bladimir Bah    Relationship: Self    Best call back number: [phone]    What is the best time to reach you: ASAP    Who are you requesting to speak with (clinical staff, provider,  specific staff member): CLINCIAL    What was the call regarding: PT CALLED REQUESTING A CALL BACK WITH TEST RESULTS    PT IS REQUESTING MORE INFORMATION REGARDING STRESS TEST    PT STATED SHE WAS IN THE [place] ER LAST NIGHT 6/15 FOR BODY ACHES AND DIZZINESS AND IS REQUESTING A CALL BACK TO DISCUSS    Do you require a callback: YES

## 2021-06-16 NOTE — TELEPHONE ENCOUNTER
LM informing pt that stress test results have not returned yet.     Also need to know what concern she is having that she wants to discuss. What is her question, so we can pass it on to Santa.    HUB can ask and relay response.

## 2021-06-18 NOTE — TELEPHONE ENCOUNTER
Crestor is sensitive to stomach pH but if she can not take Tums before taking it, usually people take Crestor at bedtime can she do that?

## 2021-06-18 NOTE — TELEPHONE ENCOUNTER
Pt aware we will call with stress test results. When avail. She said either the mag or Crestor can not be taken with antiacids per info given at pharm. She isn't sure which one came with that warning however she takes Tums on a daily basis and is concerned

## 2021-07-08 DIAGNOSIS — K21.00 GASTROESOPHAGEAL REFLUX DISEASE WITH ESOPHAGITIS WITHOUT HEMORRHAGE: ICD-10-CM

## 2021-07-08 DIAGNOSIS — K29.00 OTHER ACUTE GASTRITIS WITHOUT HEMORRHAGE: ICD-10-CM

## 2021-07-09 ENCOUNTER — OFFICE VISIT (OUTPATIENT)
Dept: OBSTETRICS AND GYNECOLOGY | Facility: CLINIC | Age: 41
End: 2021-07-09

## 2021-07-09 VITALS
WEIGHT: 203.8 LBS | DIASTOLIC BLOOD PRESSURE: 76 MMHG | HEIGHT: 66 IN | BODY MASS INDEX: 32.75 KG/M2 | SYSTOLIC BLOOD PRESSURE: 124 MMHG

## 2021-07-09 DIAGNOSIS — N89.8 VAGINAL ODOR: Primary | ICD-10-CM

## 2021-07-09 LAB
KOH PREP NAIL: NORMAL
WET PREP GENITAL: NORMAL

## 2021-07-09 PROCEDURE — 87210 SMEAR WET MOUNT SALINE/INK: CPT | Performed by: NURSE PRACTITIONER

## 2021-07-09 PROCEDURE — 87220 TISSUE EXAM FOR FUNGI: CPT | Performed by: NURSE PRACTITIONER

## 2021-07-09 PROCEDURE — 99213 OFFICE O/P EST LOW 20 MIN: CPT | Performed by: NURSE PRACTITIONER

## 2021-07-09 RX ORDER — FLUCONAZOLE 150 MG/1
TABLET ORAL
Qty: 2 TABLET | Refills: 0 | Status: SHIPPED | OUTPATIENT
Start: 2021-07-09 | End: 2021-10-19

## 2021-07-09 RX ORDER — DIPHENHYDRAMINE HCL 50 MG/1
CAPSULE ORAL
COMMUNITY
Start: 2021-06-11 | End: 2022-05-12

## 2021-07-09 RX ORDER — ONDANSETRON 4 MG/1
TABLET, ORALLY DISINTEGRATING ORAL
COMMUNITY
Start: 2021-06-16 | End: 2021-10-19

## 2021-07-09 RX ORDER — METRONIDAZOLE 500 MG/1
500 TABLET ORAL 2 TIMES DAILY
Qty: 8 TABLET | Refills: 0 | Status: SHIPPED | OUTPATIENT
Start: 2021-07-09 | End: 2021-07-13

## 2021-07-09 NOTE — PROGRESS NOTES
Chief Complaint   Patient presents with   • vaginal odor       Subjective   HPI  OleDominic Bah is a 41 y.o. female, , who presents for having a brownish colored discharge with an odor.      She states she has experienced this problem for 1 week.  She describes the severity as moderate.  She states that the problem is improving since she started taking flagyl that she had leftover. She has taken 3 days worth.  The patient reports additional symptoms as none.      Her last LMP was No LMP recorded. Patient has had a hysterectomy..  Patient reports problems with: none.  Partner Status: Marital Status: .  New Partners since last visit: no.  Desires STD Screening: no.    Additional OB/GYN History   Current contraception: contraceptive methods: Tubal ligation  Desires to: do not start contraception  Last Pap :   Last Completed Pap Smear          PAP SMEAR (Every 3 Years) Next due on 2021  Done - ASCUS, HPV negative, GC/Ch negative              History of abnormal Pap smear: yes - years ago   Last mammogram: never  Last Completed Mammogram     This patient has no relevant Health Maintenance data.              OB History        4    Para   2    Term   2            AB   2    Living   2       SAB   2    TAB        Ectopic        Molar        Multiple        Live Births   2                Health Maintenance   Topic Date Due   • Annual Gynecologic Pelvic and Breast Exam  Never done   • Pneumococcal Vaccine 0-64 (1 of 1 - PPSV23) Never done   • COVID-19 Vaccine (1) Never done   • TDAP/TD VACCINES (2 - Tdap) 10/09/2019   • ANNUAL PHYSICAL  2019   • INFLUENZA VACCINE  2021   • PAP SMEAR  2021   • LIPID PANEL  2022   • HEPATITIS C SCREENING  Completed       The additional following portions of the patient's history were reviewed and updated as appropriate: allergies, current medications, past family history, past medical history, past social history,  "past surgical history and problem list.    Review of Systems   Genitourinary:        Vag odor, dc   All other systems reviewed and are negative.      I have reviewed and agree with the HPI, ROS, and historical information as entered above. TEJA Velazquez    Objective   /76 (BP Location: Left arm, Patient Position: Sitting, Cuff Size: Adult)   Ht 167.6 cm (66\")   Wt 92.4 kg (203 lb 12.8 oz)   Breastfeeding No   BMI 32.89 kg/m²     Physical Exam  Vitals and nursing note reviewed. Exam conducted with a chaperone present.   Constitutional:       Appearance: Normal appearance.   HENT:      Head: Normocephalic and atraumatic.   Pulmonary:      Effort: Pulmonary effort is normal.   Genitourinary:     General: Normal vulva.      Labia:         Right: No rash, tenderness or lesion.         Left: No rash or tenderness.       Vagina: Normal.      Uterus: Absent.       Comments: Minimal white dc present  Neurological:      Mental Status: She is alert and oriented to person, place, and time.   Psychiatric:         Behavior: Behavior normal.         Assessment/Plan     Assessment     Problem List Items Addressed This Visit     None      Visit Diagnoses     Vaginal odor    -  Primary    Relevant Orders    POC Wet Prep    POC KOH Prep          Plan     1. Wet prep and exam normal, she has already completed 3 days of Flagyl. Will send in 4 more days for her to complete course, and given diflucan for yeast prophylaxis. Discussed vulvovaginal hygiene, and recommended Luvena for recurrent BV. Pt to call if symptoms return.       TEJA Velazquez  07/09/2021  "

## 2021-07-12 RX ORDER — DEXLANSOPRAZOLE 60 MG/1
CAPSULE, DELAYED RELEASE ORAL
Qty: 30 CAPSULE | Refills: 0 | Status: SHIPPED | OUTPATIENT
Start: 2021-07-12 | End: 2021-08-04

## 2021-07-13 ENCOUNTER — OFFICE VISIT (OUTPATIENT)
Dept: FAMILY MEDICINE CLINIC | Facility: CLINIC | Age: 41
End: 2021-07-13

## 2021-07-13 VITALS
BODY MASS INDEX: 32.78 KG/M2 | TEMPERATURE: 97.8 F | DIASTOLIC BLOOD PRESSURE: 78 MMHG | HEART RATE: 78 BPM | RESPIRATION RATE: 18 BRPM | WEIGHT: 204 LBS | HEIGHT: 66 IN | SYSTOLIC BLOOD PRESSURE: 114 MMHG

## 2021-07-13 DIAGNOSIS — R19.7 DIARRHEA, UNSPECIFIED TYPE: Primary | ICD-10-CM

## 2021-07-13 PROCEDURE — 99213 OFFICE O/P EST LOW 20 MIN: CPT | Performed by: NURSE PRACTITIONER

## 2021-07-13 RX ORDER — CHOLESTYRAMINE LIGHT 4 G/5.7G
4 POWDER, FOR SUSPENSION ORAL 2 TIMES DAILY
Qty: 60 PACKET | Refills: 1 | Status: SHIPPED | OUTPATIENT
Start: 2021-07-13 | End: 2022-02-23

## 2021-07-13 NOTE — PROGRESS NOTES
"Chief Complaint  Diarrhea    Subjective          Sha-Mansoor Genny Bah presents to [facility]  History of Present Illness  Diarrhea for 2 weeks  Took Flagyl GYN has helped a little   Called her GI specialist but he says there is nothing else to do to help her.  Urinating a lot last night, feeling swollen in belly   Feeling like going to pass out, nauseated   Joint and overall pain worse than usual   Chest and neck feel some better   Drinking electrolyte water and staying hydrated.  Making food changes has not really helped  She continues to take her Creon capsules.  Wants to go to [place] to visit her mother but cannot making the trip due to diarrhea; plans on driving  Has never tried Cholestyramine (s/p cholecystectomy) diarrhea    Thinks the Crestor is making her pain worse; ever since she started taking it her joint and leg pains have been worse  Not sure if she can take it     Objective   Vital Signs:   /78   Pulse 78   Temp 97.8 °F (36.6 °C)   Resp 18   Ht 167.6 cm (66\")   Wt 92.5 kg (204 lb)   BMI 32.93 kg/m²     Physical Exam  Constitutional:       General: She is not in acute distress.     Appearance: Normal appearance. She is not ill-appearing.   HENT:      Head: Normocephalic.   Cardiovascular:      Rate and Rhythm: Normal rate and regular rhythm.      Heart sounds: Normal heart sounds.   Pulmonary:      Effort: Pulmonary effort is normal.      Breath sounds: Normal breath sounds.   Abdominal:      Tenderness: There is abdominal tenderness (rigth upper and epigastric).   Musculoskeletal:      Cervical back: Neck supple.   Neurological:      Mental Status: She is alert and oriented to person, place, and time.   Psychiatric:         Mood and Affect: Mood is depressed.         Speech: Speech normal.         Behavior: Behavior normal.         Thought Content: Thought content normal.         Cognition and Memory: Cognition normal.         Judgment: Judgment " normal.        Result Review :                 Assessment and Plan    Diagnoses and all orders for this visit:    1. Diarrhea, unspecified type (Primary)  -     Basic metabolic panel  -     cholestyramine light (Prevalite) 4 g packet; Take 1 packet by mouth 2 (Two) Times a Day.  Dispense: 60 packet; Refill: 1        Follow Up   Return if symptoms worsen or fail to improve.  Patient was given instructions and counseling regarding her condition or for health maintenance advice. Please see specific information pulled into the AVS if appropriate.     Will have pt stop Crestor and see how she feels  Finish Flagyl   Will check electrolytes  Will start Parviz on Cholestyramine to help with diarrhea, if this does not help will send in some Lomotil. Pt will call back

## 2021-07-14 LAB
BUN SERPL-MCNC: 13 MG/DL (ref 6–20)
BUN/CREAT SERPL: 13.8 (ref 7–25)
CALCIUM SERPL-MCNC: 10.2 MG/DL (ref 8.6–10.5)
CHLORIDE SERPL-SCNC: 100 MMOL/L (ref 98–107)
CO2 SERPL-SCNC: 27.3 MMOL/L (ref 22–29)
CREAT SERPL-MCNC: 0.94 MG/DL (ref 0.57–1)
GLUCOSE SERPL-MCNC: 75 MG/DL (ref 65–99)
POTASSIUM SERPL-SCNC: 4.8 MMOL/L (ref 3.5–5.2)
SODIUM SERPL-SCNC: 142 MMOL/L (ref 136–145)

## 2021-07-22 ENCOUNTER — TELEPHONE (OUTPATIENT)
Dept: FAMILY MEDICINE CLINIC | Facility: CLINIC | Age: 41
End: 2021-07-22

## 2021-07-22 NOTE — TELEPHONE ENCOUNTER
Caller: Bladimir Bah    Relationship to patient: Self    Best call back number:     New or established patient?  [] New  [x] Established    Date of discharge: [id number]  Facility discharged from: [facility]    Diagnosis/Symptoms: PANCREATIS    Length of stay (If applicable): JUST FOR THE EVENING    PATIENT SAID SHE DECLINED TO STAY OVERNIGHT AS THEY REQUESTED HER TO DO SO; SHE ALSO DECLINED AN APPT WITH JUAN, JUST WANTED HER TO GET HER RECORDS

## 2021-07-23 DIAGNOSIS — M79.7 FIBROMYALGIA: ICD-10-CM

## 2021-07-23 RX ORDER — CYCLOBENZAPRINE HCL 10 MG
TABLET ORAL
Qty: 90 TABLET | Refills: 1 | Status: SHIPPED | OUTPATIENT
Start: 2021-07-23 | End: 2021-10-19 | Stop reason: SDUPTHER

## 2021-07-30 ENCOUNTER — HOSPITAL ENCOUNTER (OUTPATIENT)
Dept: CARDIOLOGY | Facility: HOSPITAL | Age: 41
End: 2021-07-30

## 2021-08-04 DIAGNOSIS — K29.00 OTHER ACUTE GASTRITIS WITHOUT HEMORRHAGE: ICD-10-CM

## 2021-08-04 DIAGNOSIS — K21.00 GASTROESOPHAGEAL REFLUX DISEASE WITH ESOPHAGITIS WITHOUT HEMORRHAGE: ICD-10-CM

## 2021-08-04 RX ORDER — DEXLANSOPRAZOLE 60 MG/1
CAPSULE, DELAYED RELEASE ORAL
Qty: 30 CAPSULE | Refills: 0 | Status: SHIPPED | OUTPATIENT
Start: 2021-08-04 | End: 2021-09-07

## 2021-08-11 ENCOUNTER — TELEPHONE (OUTPATIENT)
Dept: FAMILY MEDICINE CLINIC | Facility: CLINIC | Age: 41
End: 2021-08-11

## 2021-08-11 DIAGNOSIS — F11.93 OPIATE WITHDRAWAL (HCC): ICD-10-CM

## 2021-08-11 DIAGNOSIS — R21 RASH OF BOTH HANDS: Primary | ICD-10-CM

## 2021-08-11 RX ORDER — TRAZODONE HYDROCHLORIDE 50 MG/1
50-100 TABLET ORAL NIGHTLY
Qty: 180 TABLET | Refills: 3 | Status: SHIPPED | OUTPATIENT
Start: 2021-08-11 | End: 2022-02-23

## 2021-08-11 NOTE — TELEPHONE ENCOUNTER
What is she needing a referral to dermatology for?     Need information to put on the referral, what problem is she having, how long has she had it and what has she done to make it better? nara

## 2021-09-04 ENCOUNTER — NURSE TRIAGE (OUTPATIENT)
Dept: CALL CENTER | Facility: HOSPITAL | Age: 41
End: 2021-09-04

## 2021-09-04 DIAGNOSIS — K21.00 GASTROESOPHAGEAL REFLUX DISEASE WITH ESOPHAGITIS WITHOUT HEMORRHAGE: ICD-10-CM

## 2021-09-04 DIAGNOSIS — K29.00 OTHER ACUTE GASTRITIS WITHOUT HEMORRHAGE: ICD-10-CM

## 2021-09-04 RX ORDER — METRONIDAZOLE 500 MG/1
500 TABLET ORAL 2 TIMES DAILY
Qty: 8 TABLET | Refills: 0 | OUTPATIENT
Start: 2021-09-04 | End: 2021-09-08

## 2021-09-04 RX ORDER — FLUCONAZOLE 150 MG/1
TABLET ORAL
Qty: 2 TABLET | Refills: 0 | OUTPATIENT
Start: 2021-09-04

## 2021-09-04 NOTE — TELEPHONE ENCOUNTER
"Patient requesting refills on all home meds, spoke with pharmacy will give three day supply on the essential meds.  Caller informed and will be calling her provider on Tuesday    Reason for Disposition  • [1] Caller has NON-URGENT medicine question about med that PCP prescribed AND [2] triager unable to answer question    Additional Information  • Negative: New-onset or worsening symptoms, see that guideline  (e.g., diarrhea, runny nose, sore throat)  • Negative: Medicine question not related to refill or renewal  • Negative: Caller requesting information unrelated to medicine  • Negative: [1] Prescription refill request for ESSENTIAL medicine (i.e., likelihood of harm to patient if not taken) AND [2] triager unable to refill per department policy  • Negative: [1] Prescription not at pharmacy AND [2] was prescribed by PCP recently (Exception: triager has access to EMR and prescription is recorded there. Go to Home Care and confirm for pharmacy.)  • Negative: [1] Pharmacy calling with prescription questions AND [2] triager unable to answer question  • Negative: Prescription request for new medicine (not a refill)  • Negative: Caller requesting a CONTROLLED substance prescription refill (e.g., narcotics, ADHD medicines)  • Negative: [1] Prescription refill request for NON-ESSENTIAL medicine (i.e., no harm to patient if med not taken) AND [2] triager unable to refill per department policy    Answer Assessment - Initial Assessment Questions  1. DRUG NAME: \"What medicine do you need to have refilled?\"      Wanting refill on all home meds  2. REFILLS REMAINING: \"How many refills are remaining?\" (Note: The label on the medicine or pill bottle will show how many refills are remaining. If there are no refills remaining, then a renewal may be needed.)      none  3. EXPIRATION DATE: \"What is the expiration date?\" (Note: The label states when the prescription will , and thus can no longer be refilled.)      na  4. " "PRESCRIBING HCP: \"Who prescribed it?\" Reason: If prescribed by specialist, call should be referred to that group.      na  5. SYMPTOMS: \"Do you have any symptoms?\"      na  6. PREGNANCY: \"Is there any chance that you are pregnant?\" \"When was your last menstrual period?\"      na    Protocols used: MEDICATION REFILL AND RENEWAL CALL-ADULT-      "

## 2021-09-07 ENCOUNTER — TELEPHONE (OUTPATIENT)
Dept: FAMILY MEDICINE CLINIC | Facility: CLINIC | Age: 41
End: 2021-09-07

## 2021-09-07 DIAGNOSIS — F11.93 OPIATE WITHDRAWAL (HCC): ICD-10-CM

## 2021-09-07 DIAGNOSIS — M35.3 POLYMYALGIA (HCC): ICD-10-CM

## 2021-09-07 DIAGNOSIS — E78.2 MIXED HYPERLIPIDEMIA: ICD-10-CM

## 2021-09-07 DIAGNOSIS — F41.9 ANXIETY: ICD-10-CM

## 2021-09-07 RX ORDER — DEXLANSOPRAZOLE 60 MG/1
CAPSULE, DELAYED RELEASE ORAL
Qty: 30 CAPSULE | Refills: 0 | Status: SHIPPED | OUTPATIENT
Start: 2021-09-07 | End: 2021-10-13

## 2021-09-07 RX ORDER — ROSUVASTATIN CALCIUM 5 MG/1
5 TABLET, COATED ORAL DAILY
Qty: 90 TABLET | Refills: 1 | Status: SHIPPED | OUTPATIENT
Start: 2021-09-07 | End: 2022-02-23 | Stop reason: SDUPTHER

## 2021-09-07 RX ORDER — AMLODIPINE BESYLATE 5 MG/1
5 TABLET ORAL DAILY
Qty: 90 TABLET | Refills: 3 | Status: SHIPPED | OUTPATIENT
Start: 2021-09-07 | End: 2022-07-28 | Stop reason: SDUPTHER

## 2021-09-07 RX ORDER — HYDROXYZINE 50 MG/1
50 TABLET, FILM COATED ORAL 3 TIMES DAILY
Qty: 90 TABLET | Refills: 5 | Status: SHIPPED | OUTPATIENT
Start: 2021-09-07 | End: 2022-02-23 | Stop reason: SDUPTHER

## 2021-09-07 RX ORDER — IMIPRAMINE HCL 25 MG
25 TABLET ORAL DAILY
Qty: 90 TABLET | Refills: 1 | Status: SHIPPED | OUTPATIENT
Start: 2021-09-07 | End: 2022-02-23 | Stop reason: SDUPTHER

## 2021-09-07 RX ORDER — CLONIDINE HYDROCHLORIDE 0.1 MG/1
0.1 TABLET ORAL 3 TIMES DAILY PRN
Qty: 90 TABLET | Refills: 1 | Status: SHIPPED | OUTPATIENT
Start: 2021-09-07 | End: 2021-10-27

## 2021-09-07 RX ORDER — DULOXETIN HYDROCHLORIDE 60 MG/1
60 CAPSULE, DELAYED RELEASE ORAL DAILY
Qty: 90 CAPSULE | Refills: 1 | Status: SHIPPED | OUTPATIENT
Start: 2021-09-07 | End: 2022-02-23 | Stop reason: SDUPTHER

## 2021-09-07 NOTE — TELEPHONE ENCOUNTER
"Candelaria Wright, RN routed conversation to [facility] Pool 3 days ago   Candelaria Wright, RN 3 days ago   CT     Patient requesting refills on all home meds, spoke with pharmacy will give three day supply on the essential meds.  Caller informed and will be calling her provider on Tuesday     Reason for Disposition  • [1] Caller has NON-URGENT medicine question about med that PCP prescribed AND [2] triager unable to answer question    Additional Information  • Negative: New-onset or worsening symptoms, see that guideline  (e.g., diarrhea, runny nose, sore throat)  • Negative: Medicine question not related to refill or renewal  • Negative: Caller requesting information unrelated to medicine  • Negative: [1] Prescription refill request for ESSENTIAL medicine (i.e., likelihood of harm to patient if not taken) AND [2] triager unable to refill per department policy  • Negative: [1] Prescription not at pharmacy AND [2] was prescribed by PCP recently (Exception: triager has access to EMR and prescription is recorded there. Go to Home Care and confirm for pharmacy.)  • Negative: [1] Pharmacy calling with prescription questions AND [2] triager unable to answer question  • Negative: Prescription request for new medicine (not a refill)  • Negative: Caller requesting a CONTROLLED substance prescription refill (e.g., narcotics, ADHD medicines)  • Negative: [1] Prescription refill request for NON-ESSENTIAL medicine (i.e., no harm to patient if med not taken) AND [2] triager unable to refill per department policy    Answer Assessment - Initial Assessment Questions  1. DRUG NAME: \"What medicine do you need to have refilled?\"      Wanting refill on all home meds  2. REFILLS REMAINING: \"How many refills are remaining?\" (Note: The label on the medicine or pill bottle will show how many refills are remaining. If there are no refills remaining, then a renewal may be needed.)      none  3. EXPIRATION DATE: \"What is the " "expiration date?\" (Note: The label states when the prescription will , and thus can no longer be refilled.)      na  4. PRESCRIBING HCP: \"Who prescribed it?\" Reason: If prescribed by specialist, call should be referred to that group.      na  5. SYMPTOMS: \"Do you have any symptoms?\"      na  6. PREGNANCY: \"Is there any chance that you are pregnant?\" \"When was your last menstrual period?\"      na    Protocols used: MEDICATION REFILL AND RENEWAL CALL-[facility]-               Documentation    Bladimir Bah [phone]  Candelaria Wright, RN 3 days ago     wanting all meds refilled,        "

## 2021-09-08 ENCOUNTER — TELEPHONE (OUTPATIENT)
Dept: FAMILY MEDICINE CLINIC | Facility: CLINIC | Age: 41
End: 2021-09-08

## 2021-09-08 NOTE — TELEPHONE ENCOUNTER
Caller: Bladimir Bah    Relationship to patient: Self    Best call back number: [phone]    Patient is needing: PATIENT STATED THAT SHE HAS BEEN HAVING A HARD TIME REACHING HER RHEUMATOLOGIST AND WOULD LIKE TO KNOW IF WE COULD OR HELP GET IN TOUCH WITH THEM OR WHAT SHE WOULD NEED TO DO    PLEASE ADVISE

## 2021-09-09 NOTE — TELEPHONE ENCOUNTER
Called she just needs to phone.     Mallika looking online I see daphne. Can you call patient and give her the [facility] number.

## 2021-09-14 ENCOUNTER — OFFICE VISIT (OUTPATIENT)
Dept: OBSTETRICS AND GYNECOLOGY | Facility: CLINIC | Age: 41
End: 2021-09-14

## 2021-09-14 VITALS — SYSTOLIC BLOOD PRESSURE: 116 MMHG | BODY MASS INDEX: 33.54 KG/M2 | WEIGHT: 207.8 LBS | DIASTOLIC BLOOD PRESSURE: 80 MMHG

## 2021-09-14 DIAGNOSIS — N89.8 VAGINAL ITCHING: ICD-10-CM

## 2021-09-14 DIAGNOSIS — N89.8 VAGINAL DISCHARGE: Primary | ICD-10-CM

## 2021-09-14 PROCEDURE — 99213 OFFICE O/P EST LOW 20 MIN: CPT | Performed by: NURSE PRACTITIONER

## 2021-09-14 RX ORDER — PANTOPRAZOLE SODIUM 40 MG/1
TABLET, DELAYED RELEASE ORAL
COMMUNITY
Start: 2021-09-07 | End: 2021-10-13 | Stop reason: SDUPTHER

## 2021-09-14 RX ORDER — PREDNISONE 10 MG/1
TABLET ORAL
COMMUNITY
Start: 2021-09-05 | End: 2021-11-03

## 2021-09-14 NOTE — PROGRESS NOTES
Chief Complaint   Patient presents with   • Follow-up     discharge       Subjective   HPI  Bladimir Bah is a 41 y.o. female, , who presents for discharge.      She states she has experienced this problem for 1 week.  She describes the severity as moderate.  She states that the problem is worsening.  The patient reports additional symptoms as vaginal discharge .      Her last LMP was No LMP recorded. Patient has had a hysterectomy. Partner Status: Marital Status: single.  New Partners since last visit: no.  Desires STD Screening: no.    Additional OB/GYN History   Current contraception: contraceptive methods: hysterectomy  Desires to: do not start contraception  Last Pap :   Last Completed Pap Smear          PAP SMEAR (Every 3 Years) Next due on 2021  Done - ASCUS, HPV negative, GC/Ch negative              History of abnormal Pap smear: yes - not in the last 10 years  Last mammogram:   Last Completed Mammogram     This patient has no relevant Health Maintenance data.        Tobacco Usage?: Yes Bladimir Bah  reports that she has been smoking cigarettes. She has been smoking about 1.00 pack per day. She has never used smokeless tobacco.. I have educated her on the risk of diseases from using tobacco products such as cancer, COPD and heart disease.     I advised her to quit and she is not willing to quit.    I spent 3  minutes counseling the patient.        OB History        4    Para   2    Term   2            AB   2    Living   2       SAB   2    TAB        Ectopic        Molar        Multiple        Live Births   2                Health Maintenance   Topic Date Due   • Annual Gynecologic Pelvic and Breast Exam  Never done   • Pneumococcal Vaccine 0-64 (1 of 2 - PPSV23) Never done   • COVID-19 Vaccine (1) Never done   • TDAP/TD VACCINES (2 - Tdap) 10/09/2019   • ANNUAL PHYSICAL  2019   • INFLUENZA VACCINE  10/01/2021   • PAP SMEAR  2021   • LIPID  PANEL  06/11/2022   • HEPATITIS C SCREENING  Completed       The additional following portions of the patient's history were reviewed and updated as appropriate: allergies, current medications, past family history, past medical history, past social history and past surgical history.    Review of Systems   Constitutional: Negative.    Respiratory: Negative.    Gastrointestinal: Negative.    Genitourinary: Positive for vaginal discharge.   Neurological: Negative.    Psychiatric/Behavioral: Negative.        I have reviewed and agree with the HPI, ROS, and historical information as entered above. TEJA Pérez    Objective   /80   Wt 94.3 kg (207 lb 12.8 oz)   BMI 33.54 kg/m²     Physical Exam  Constitutional:       Appearance: Normal appearance.   Cardiovascular:      Rate and Rhythm: Normal rate and regular rhythm.   Abdominal:      Palpations: Abdomen is soft.   Genitourinary:     General: Normal vulva.      Vagina: Vaginal discharge ( thick, white discharge present) present.      Cervix: Discharge present.      Uterus: Normal.       Adnexa: Right adnexa normal and left adnexa normal.      Rectum: Normal.   Neurological:      Mental Status: She is alert.         Assessment/Plan     Assessment     Problem List Items Addressed This Visit     None      Visit Diagnoses     Vaginal discharge    -  Primary    Relevant Medications    terconazole (TERAZOL 7) 0.4 % vaginal cream    Vaginal itching        Relevant Medications    terconazole (TERAZOL 7) 0.4 % vaginal cream          1. Vaginal discharge  2. Vaginal itching  3. + KOH test  4. Negative wet prepg    Plan     + KOH test  2.   Will use terazol 7 since still has symptoms after monistat OTC and diflucan. Symptoms improved but returned after stopping meds.       TEJA Pérez  09/14/2021

## 2021-09-20 ENCOUNTER — TELEPHONE (OUTPATIENT)
Dept: FAMILY MEDICINE CLINIC | Facility: CLINIC | Age: 41
End: 2021-09-20

## 2021-09-20 DIAGNOSIS — Z82.49 FAMILY HISTORY OF EARLY CAD: ICD-10-CM

## 2021-09-20 DIAGNOSIS — E78.2 MIXED HYPERLIPIDEMIA: ICD-10-CM

## 2021-09-20 DIAGNOSIS — R07.89 OTHER CHEST PAIN: ICD-10-CM

## 2021-09-20 DIAGNOSIS — Z82.49 FAMILY HISTORY OF ASCVD (ARTERIOSCLEROTIC CARDIOVASCULAR DISEASE): Primary | ICD-10-CM

## 2021-09-21 NOTE — TELEPHONE ENCOUNTER
I have placed 3 orders for referrals for Nia heart: 11/16/20 ECHO-no show; 1/6/21 Cardiology Dr Foley- no show; and a Stress test 6/11/21 no show. It does not appear she is serious about getting her heart checked out. I am hesitant to put in another referral because No show rate is so high. [facility]

## 2021-09-23 ENCOUNTER — TELEPHONE (OUTPATIENT)
Dept: FAMILY MEDICINE CLINIC | Facility: CLINIC | Age: 41
End: 2021-09-23

## 2021-09-23 DIAGNOSIS — R19.7 DIARRHEA, UNSPECIFIED TYPE: Primary | ICD-10-CM

## 2021-09-23 PROBLEM — E78.2 MIXED HYPERLIPIDEMIA: Status: ACTIVE | Noted: 2021-09-23

## 2021-09-23 RX ORDER — DIPHENOXYLATE HYDROCHLORIDE AND ATROPINE SULFATE 2.5; .025 MG/1; MG/1
1 TABLET ORAL 4 TIMES DAILY PRN
Qty: 20 TABLET | Refills: 0 | Status: SHIPPED | OUTPATIENT
Start: 2021-09-23 | End: 2022-02-23 | Stop reason: SDUPTHER

## 2021-09-23 NOTE — TELEPHONE ENCOUNTER
PATIENT IS NEEDING SOMETHING SENT IN TO THE PHARMACY FOR HER DIARRHEA SHE STATES SHE WAS SEEN AT [facility] A WEEK OR TWO AGO BUT NOT ANY BETTER I TRIED SCHEDULING HER AN APPT NEXT WEEK FOR THE F/U BUT SHE STATES SHE CANT COME.

## 2021-09-24 ENCOUNTER — TELEPHONE (OUTPATIENT)
Dept: OBSTETRICS AND GYNECOLOGY | Facility: CLINIC | Age: 41
End: 2021-09-24

## 2021-09-28 ENCOUNTER — TELEPHONE (OUTPATIENT)
Dept: GASTROENTEROLOGY | Facility: CLINIC | Age: 41
End: 2021-09-28

## 2021-09-28 ENCOUNTER — OFFICE VISIT (OUTPATIENT)
Dept: OBSTETRICS AND GYNECOLOGY | Facility: CLINIC | Age: 41
End: 2021-09-28

## 2021-09-28 VITALS — WEIGHT: 205.4 LBS | SYSTOLIC BLOOD PRESSURE: 124 MMHG | BODY MASS INDEX: 33.15 KG/M2 | DIASTOLIC BLOOD PRESSURE: 84 MMHG

## 2021-09-28 DIAGNOSIS — Z01.419 WOMEN'S ANNUAL ROUTINE GYNECOLOGICAL EXAMINATION: Primary | ICD-10-CM

## 2021-09-28 DIAGNOSIS — Z11.3 SCREENING FOR STD (SEXUALLY TRANSMITTED DISEASE): ICD-10-CM

## 2021-09-28 DIAGNOSIS — N89.8 VAGINAL ODOR: ICD-10-CM

## 2021-09-28 PROCEDURE — 99396 PREV VISIT EST AGE 40-64: CPT | Performed by: NURSE PRACTITIONER

## 2021-09-28 RX ORDER — METRONIDAZOLE 500 MG/1
TABLET ORAL
COMMUNITY
Start: 2021-09-24 | End: 2021-10-19

## 2021-09-28 RX ORDER — ONDANSETRON 4 MG/1
TABLET, FILM COATED ORAL SEE ADMIN INSTRUCTIONS
COMMUNITY
Start: 2021-09-24 | End: 2021-10-19 | Stop reason: SDUPTHER

## 2021-09-28 NOTE — TELEPHONE ENCOUNTER
I tried to call Ms Bah regarding Dr Guillen recommendation. No answer; left voice message. Dr Guillen has no work in spots right. Patient will need to come to the ER at [facility] or [facility] of symptoms worsens.

## 2021-09-28 NOTE — PROGRESS NOTES
GYN Annual Exam     CC - Here for annual exam.        HPI  Bladimir Bah is a 41 y.o. female, , who presents for annual well woman exam. No LMP recorded. Patient has had a hysterectomy. Patient reports problems with: white vaginal discharge, pelvic pain, vaginal odor, and nausea . There were no changes to her medical or surgical history since her last visit.. Partner Status: Marital Status: single.  New Partners since last visit: yes.  Desires STD Screening: yes.    Patient was seen 1 week ago for a yeast infection and was given a vaginal cream to help but she states when she uses it, it burns. She also reports she went to the ER due to symptoms not getting better and reports that she was given flagyl. She states when she takes an antibiotic it feels like it gets better but when she stops it the symptoms return.    Additional OB/GYN History   Current contraception: contraceptive methods: hysterectomy  Desires to: do not start contraception  Last Pap :   Last Completed Pap Smear          Ordered - PAP SMEAR (Every 3 Years) Ordered on 2018  Done - ASCUS, HPV negative, GC/Ch negative              History of abnormal Pap smear: yes - 19yo  Family history of uterine, colon, breast, or ovarian cancer: patient is unsure  Performs monthly Self-Breast Exam: no  Last mammogram: never    Last Completed Mammogram     This patient has no relevant Health Maintenance data.         Exercises Regularly: yes  Feelings of Anxiety or Depression: yes - anxiety and depression  Tobacco Usage?: Yes Bladimir Bah  reports that she has been smoking cigarettes. She has been smoking about 1.00 pack per day. She has never used smokeless tobacco.. I have educated her on the risk of diseases from using tobacco products such as cancer, COPD and heart disease.     I advised her to quit and she is not willing to quit.    I spent 3  minutes counseling the patient.        OB History        4    Para    2    Term   2            AB   2    Living   2       SAB   2    TAB        Ectopic        Molar        Multiple        Live Births   2                Health Maintenance   Topic Date Due   • Annual Gynecologic Pelvic and Breast Exam  Never done   • Pneumococcal Vaccine 0-64 (1 of 2 - PPSV23) Never done   • COVID-19 Vaccine (1) Never done   • TDAP/TD VACCINES (2 - Tdap) 10/09/2019   • ANNUAL PHYSICAL  2019   • INFLUENZA VACCINE  10/01/2021   • PAP SMEAR  2021   • LIPID PANEL  2022   • HEPATITIS C SCREENING  Completed       The additional following portions of the patient's history were reviewed and updated as appropriate: allergies, current medications, past family history, past medical history, past social history and past surgical history.    Review of Systems   Constitutional: Negative.    Respiratory: Negative.    Gastrointestinal: Positive for nausea.   Genitourinary: Positive for pelvic pain and vaginal discharge.        Vaginal odor   Neurological: Negative.    Psychiatric/Behavioral: Positive for depressed mood. The patient is nervous/anxious.          I have reviewed and agree with the HPI, ROS, and historical information as entered above. Estrella Ríos, APRN    Objective   /84   Wt 93.2 kg (205 lb 6.4 oz)   BMI 33.15 kg/m²     Physical Exam  Vitals and nursing note reviewed. Exam conducted with a chaperone present.   Constitutional:       Appearance: Normal appearance. She is obese.   Cardiovascular:      Rate and Rhythm: Normal rate and regular rhythm.   Chest:      Breasts:         Right: Normal.         Left: Normal.   Abdominal:      Palpations: Abdomen is soft.   Genitourinary:     General: Normal vulva.      Vagina: Vaginal discharge ( scant amount of white discharge) present.      Uterus: Absent.       Rectum: Normal.      Comments: Complains of mid pelvic area tenderness o  Neurological:      Mental Status: She is alert.            Assessment and Plan    Problem  List Items Addressed This Visit     None      Visit Diagnoses     Women's annual routine gynecological examination    -  Primary    Relevant Orders    IGP, Rfx Aptima HPV ASCU    Screening for STD (sexually transmitted disease)        Relevant Orders    IGP, Rfx Aptima HPV ASCU    NuSwab VG+ - Swab, Vagina (Completed)    Vaginal odor              1. GYN annual well woman exam.   2. Pt. Is not interested in scheduling a screening mammogram. She understands recommendation of screening mammogram at age 40.   3. STD cultures and cultures for yeast and BV today per pt. Request.   4. She is on medications for anxiety that are prescribed by another medical provider.   5. Reviewed monthly self breast exams.  Instructed to call with lumps, pain, or breast discharge.    6. Reviewed exercise as a preventative health measures.   7. Reccommended Flu Vaccine in Fall of each year.  8. RTC in 1 year or PRN with problems.      Estrella Ríos, APRN  09/28/2021

## 2021-09-28 NOTE — PROGRESS NOTES
No chief complaint on file.      Subjective   HPI  Ole-Mansoor Bah is a 41 y.o. female, , who presents for ***.      She states she has experienced this problem for {numbers; 0-10:06468} {follow-up time:}.  She describes the severity as {SEVERITY:68340}.  She states that the problem is {AMB SYMPTOM PROGRESSION:54522}.  The patient reports additional symptoms as {gyn cyclic prob:89689}.      Her last LMP was No LMP recorded. Patient has had a hysterectomy..  Periods are {gyn period regularity:715}, lasting {numbers; 0-10:69888} days.  Dysmenorrhea:{gyn dysmenorrhea:716}.  Patient reports problems with: {gyn cyclic prob:31585}.  Partner Status: {Marital Status:25302}.  New Partners since last visit: {YES:59408}.  Desires STD Screening: {YES:26239}.    Additional OB/GYN History   Current contraception: {contraceptive methods:82604}  Desires to: {start_stop_continue_change:94987} contraception  Last Pap :   Last Completed Pap Smear          PAP SMEAR (Every 3 Years) Next due on 2021  Done - ASCUS, HPV negative, GC/Ch negative              History of abnormal Pap smear: {yes***/no:86141}  Last mammogram:   Last Completed Mammogram     This patient has no relevant Health Maintenance data.        Tobacco Usage?: {Tobacco Usage Optional:78338}   OB History        4    Para   2    Term   2            AB   2    Living   2       SAB   2    TAB        Ectopic        Molar        Multiple        Live Births   2                Health Maintenance   Topic Date Due   • Annual Gynecologic Pelvic and Breast Exam  Never done   • Pneumococcal Vaccine 0-64 (1 of 2 - PPSV23) Never done   • COVID-19 Vaccine (1) Never done   • TDAP/TD VACCINES (2 - Tdap) 10/09/2019   • ANNUAL PHYSICAL  2019   • INFLUENZA VACCINE  10/01/2021   • PAP SMEAR  2021   • LIPID PANEL  2022   • HEPATITIS C SCREENING  Completed       The additional following portions of the patient's history were  "reviewed and updated as appropriate: {history reviewed:20406::\"allergies\",\"current medications\",\"past family history\",\"past medical history\",\"past social history\",\"past surgical history\",\"problem list\"}.    Review of Systems   Constitutional: Negative.    Respiratory: Negative.    Gastrointestinal: Negative.    Genitourinary: Negative.    Neurological: Negative.    Psychiatric/Behavioral: Negative.        I have reviewed and agree with the HPI, ROS, and historical information as entered above. Mackenzie Wong MA    Objective   There were no vitals taken for this visit.    Physical Exam    Assessment/Plan     Assessment     Problem List Items Addressed This Visit     None          1. ***    Plan     1. No follow-ups on file.  2. ***      Mackenzie Wong MA  09/28/2021  "

## 2021-09-28 NOTE — TELEPHONE ENCOUNTER
Dr Guillen,  Ms Bah called this afternoon. Complains of sharp abdominal pain( pain scale is a 5), vomiting, bloating,thick mucusy diarrhea, no fever, no blood. Patient stated she went to [facility]. The doctor sent her home and told her that she doesn't have pancreatitis but she thinks she does. I advised patient to come to our ER if she is having severe pain. Ms Bah stated she can't make it to our hospital all the time when she vomiting and having frequent diarrhea. She can't wait until your next available in January 2022. Please advise Thanks

## 2021-09-29 NOTE — TELEPHONE ENCOUNTER
I spoke with Ms Bah. Patient agreed to go to the [facility] ER or [facility] for current symptoms.

## 2021-09-30 LAB
A VAGINAE DNA VAG QL NAA+PROBE: NORMAL SCORE
BVAB2 DNA VAG QL NAA+PROBE: NORMAL SCORE
C ALBICANS DNA VAG QL NAA+PROBE: NEGATIVE
C GLABRATA DNA VAG QL NAA+PROBE: NEGATIVE
C TRACH DNA VAG QL NAA+PROBE: NEGATIVE
MEGA1 DNA VAG QL NAA+PROBE: NORMAL SCORE
N GONORRHOEA DNA VAG QL NAA+PROBE: NEGATIVE
T VAGINALIS DNA VAG QL NAA+PROBE: NEGATIVE

## 2021-10-06 DIAGNOSIS — Z11.3 SCREENING FOR STD (SEXUALLY TRANSMITTED DISEASE): ICD-10-CM

## 2021-10-06 DIAGNOSIS — Z01.419 WOMEN'S ANNUAL ROUTINE GYNECOLOGICAL EXAMINATION: ICD-10-CM

## 2021-10-07 ENCOUNTER — TELEPHONE (OUTPATIENT)
Dept: OBSTETRICS AND GYNECOLOGY | Facility: CLINIC | Age: 41
End: 2021-10-07

## 2021-10-13 DIAGNOSIS — K29.00 OTHER ACUTE GASTRITIS WITHOUT HEMORRHAGE: ICD-10-CM

## 2021-10-13 DIAGNOSIS — K21.00 GASTROESOPHAGEAL REFLUX DISEASE WITH ESOPHAGITIS WITHOUT HEMORRHAGE: ICD-10-CM

## 2021-10-13 RX ORDER — DEXLANSOPRAZOLE 60 MG/1
CAPSULE, DELAYED RELEASE ORAL
Qty: 30 CAPSULE | Refills: 0 | Status: SHIPPED | OUTPATIENT
Start: 2021-10-13 | End: 2021-11-10

## 2021-10-19 ENCOUNTER — TELEPHONE (OUTPATIENT)
Dept: FAMILY MEDICINE CLINIC | Facility: CLINIC | Age: 41
End: 2021-10-19

## 2021-10-19 ENCOUNTER — OFFICE VISIT (OUTPATIENT)
Dept: OBSTETRICS AND GYNECOLOGY | Facility: CLINIC | Age: 41
End: 2021-10-19

## 2021-10-19 VITALS
DIASTOLIC BLOOD PRESSURE: 80 MMHG | SYSTOLIC BLOOD PRESSURE: 130 MMHG | HEIGHT: 66 IN | BODY MASS INDEX: 32.95 KG/M2 | WEIGHT: 205 LBS

## 2021-10-19 DIAGNOSIS — F11.93 OPIATE WITHDRAWAL (HCC): ICD-10-CM

## 2021-10-19 DIAGNOSIS — B96.89 BACTERIAL VAGINITIS: ICD-10-CM

## 2021-10-19 DIAGNOSIS — R11.0 NAUSEA: ICD-10-CM

## 2021-10-19 DIAGNOSIS — N76.0 BACTERIAL VAGINITIS: ICD-10-CM

## 2021-10-19 DIAGNOSIS — N89.3 VAGINAL DYSPLASIA: ICD-10-CM

## 2021-10-19 DIAGNOSIS — M79.7 FIBROMYALGIA: ICD-10-CM

## 2021-10-19 DIAGNOSIS — B37.9 YEAST DETECTED: ICD-10-CM

## 2021-10-19 PROBLEM — N87.0 MILD DYSPLASIA OF CERVIX (CIN I): Status: ACTIVE | Noted: 2021-10-19

## 2021-10-19 PROCEDURE — 57421 EXAM/BIOPSY OF VAG W/SCOPE: CPT | Performed by: OBSTETRICS & GYNECOLOGY

## 2021-10-19 RX ORDER — PROMETHAZINE HYDROCHLORIDE 25 MG/1
25 TABLET ORAL EVERY 6 HOURS PRN
Qty: 30 TABLET | Refills: 1 | Status: SHIPPED | OUTPATIENT
Start: 2021-10-19 | End: 2022-02-23 | Stop reason: SDUPTHER

## 2021-10-19 RX ORDER — ONDANSETRON 4 MG/1
4 TABLET, FILM COATED ORAL SEE ADMIN INSTRUCTIONS
Qty: 30 TABLET | Refills: 1 | Status: SHIPPED | OUTPATIENT
Start: 2021-10-19 | End: 2022-02-23 | Stop reason: SDUPTHER

## 2021-10-19 RX ORDER — FLUCONAZOLE 150 MG/1
TABLET ORAL
Qty: 2 TABLET | Refills: 0 | Status: SHIPPED | OUTPATIENT
Start: 2021-10-19 | End: 2021-10-28

## 2021-10-19 RX ORDER — METRONIDAZOLE 500 MG/1
TABLET ORAL
Qty: 14 TABLET | Refills: 4 | Status: SHIPPED | OUTPATIENT
Start: 2021-10-19 | End: 2021-10-31

## 2021-10-19 RX ORDER — CYCLOBENZAPRINE HCL 10 MG
10 TABLET ORAL 3 TIMES DAILY PRN
Qty: 270 TABLET | Refills: 1 | Status: SHIPPED | OUTPATIENT
Start: 2021-10-19 | End: 2022-02-23 | Stop reason: SDUPTHER

## 2021-10-19 NOTE — PROGRESS NOTES
Colposcopy Procedure Note  Procedures     Indications: Nia Bah is a 41 y.o. female, , whose No LMP recorded. Patient has had a hysterectomy..  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  She understands the need for the procedure and is aware of the complications, including post-colposcopic vaginal bleeding, vaginal leukorrhea or cervicitis.  She is aware she may experience discomfort.  After being presented with the risk, benefits, and alternatives the patient wished to proceed.      Prior cervical treatment: no treatment.    Procedure Details   The risks and benefits of the procedure and Verbal informed consent obtained.  She was positioned in the dorsal lithotomy position and a speculum was inserted into the vagina and excellent visualization of cuff achieved, cuff swabbed x 3 with acetic acid solution. The transformation zone Was not seen as there is no cervix completely visualized.  A cervical biopsy Was not done as she has no cervix.  A vaginal biopsy was done at a spot at 7:00 that was white obtained at  7 o'clock.  An endocervical curettage was not performed.  This colposcopy was satisfactory.     Findings:  The procedure was notable for:  Physical Exam  Vitals and nursing note reviewed. Exam conducted with a chaperone present.   Constitutional:       Appearance: She is well-developed.   HENT:      Head: Normocephalic and atraumatic.   Neck:      Thyroid: No thyroid mass or thyromegaly.   Cardiovascular:      Rate and Rhythm: Normal rate and regular rhythm.      Heart sounds: No murmur heard.      Pulmonary:      Effort: Pulmonary effort is normal. No retractions.      Breath sounds: Normal breath sounds. No wheezing, rhonchi or rales.   Chest:      Chest wall: No mass or tenderness.   Breasts:      Right: Normal. No mass, nipple discharge, skin change or  tenderness.      Left: Normal. No mass, nipple discharge, skin change or tenderness.       Abdominal:      General: Bowel sounds are normal.      Palpations: Abdomen is soft. Abdomen is not rigid. There is no mass.      Tenderness: There is no abdominal tenderness. There is no guarding.      Hernia: No hernia is present. There is no hernia in the left inguinal area or right inguinal area.   Genitourinary:     General: Normal vulva.      Exam position: Lithotomy position.      Pubic Area: No rash.       Labia:         Right: No rash, tenderness or lesion.         Left: No rash, tenderness or lesion.       Urethra: No urethral pain or urethral swelling.      Vagina: Normal. No vaginal discharge or lesions.      Uterus: Absent.       Adnexa:         Right: No mass, tenderness or fullness.          Left: No mass, tenderness or fullness.        Rectum: No external hemorrhoid.      Comments: Cervix surgically absent.  Vaginal cuff intact.  Musculoskeletal:      Cervical back: Normal range of motion. No muscular tenderness.   Neurological:      Mental Status: She is alert and oriented to person, place, and time.   Psychiatric:         Behavior: Behavior normal.           Specimens: Vaginal biopsy at 7:00  Specimens labelled and sent to Pathology.    Complications: none.    Assessment and Plan    Problem List Items Addressed This Visit        Genitourinary and Reproductive     Vaginal dysplasia    Relevant Orders    Tissue Pathology Exam (Completed)      Other Visit Diagnoses     Bacterial vaginitis        Yeast detected              1. Will base further treatment on Pathology findings.  2. Treatment options discussed with patient.  3. Post biopsy instructions given to patient.  4. Repeat Pap in 3 months  5. Were also going to treat what looks like bacterial vaginitis and yeast.  The bacterial vaginitis is chronic and recurrent Morganella place her on metronidazole full week treatment followed by twice a week treatment for  prevention of recurrences.  We will add Diflucan to prevent yeast infection.    Elizabeth Sinclair MD  10/19/2021

## 2021-10-19 NOTE — TELEPHONE ENCOUNTER
I sent in Promethazine, Zofran and the only other medication that I did not just fill was Flexeril. I thinks all other meds were sent in for 90 days with one refill on 9/24/21. willard

## 2021-10-19 NOTE — TELEPHONE ENCOUNTER
Relationship: Self    Medication requested (name and dosage):     promethazine (PHENERGAN) 25 MG tablet    ondansetron ODT (ZOFRAN-ODT) 4 MG disintegrating tablet    SUPOSITORY - PATIENT COULD NOT RECALL THE NAME OF THIS MEDICATION    PATIENT STATED SHE NEEDS THESE MEDICATIONS LISTED ABOVE TODAY    Requested Prescriptions:     PATIENT ALSO REQUESTED ALL OF HER MEDICATIONS BE REFILLED ASIDE FROM THOSE LISTED ABOVE    Pharmacy where request should be sent:     [facility] - [place], [place]    TELEPHONE CONTACT:    [phone]    Additional details provided by patient:     PATIENT STATED SHE IS COMPLETELY OUT OF THE MEDICATIONS LISTED ABOVE    Best call back number:     [phone]     Does the patient have less than a 3 day supply:  [x] Yes  [] No    Reanna Guadalupe Rep   10/19/21 16:09 EDT     JUAN MENDEZ

## 2021-10-22 ENCOUNTER — TELEPHONE (OUTPATIENT)
Dept: FAMILY MEDICINE CLINIC | Facility: CLINIC | Age: 41
End: 2021-10-22

## 2021-10-22 DIAGNOSIS — B37.0 THRUSH: Primary | ICD-10-CM

## 2021-10-22 NOTE — TELEPHONE ENCOUNTER
Caller: Bladimir Bah    Relationship: Self    Best call back number: [phone]    What medication are you requesting: THRUSH MEDICATION     What are your current symptoms: THICK, WHITE FILM ON TONGUE     How long have you been experiencing symptoms: 1 WEEK   Have you had these symptoms before:    [x] Yes  [] No    Have you been treated for these symptoms before:   [x] Yes  [] No    If a prescription is needed, what is your preferred pharmacy and phone number: [id number] - [place], [place] - [address] [place] [address] AT [facility] [address] - [phone]  - [phone] FX     Additional notes:

## 2021-10-26 ENCOUNTER — HOSPITAL ENCOUNTER (OUTPATIENT)
Dept: CARDIOLOGY | Facility: HOSPITAL | Age: 41
Discharge: HOME | End: 2021-10-26
Admitting: NURSE PRACTITIONER

## 2021-10-26 VITALS
HEIGHT: 66 IN | WEIGHT: 202 LBS | BODY MASS INDEX: 32.47 KG/M2 | SYSTOLIC BLOOD PRESSURE: 120 MMHG | HEART RATE: 100 BPM | OXYGEN SATURATION: 98 % | DIASTOLIC BLOOD PRESSURE: 90 MMHG

## 2021-10-26 DIAGNOSIS — R07.89 OTHER CHEST PAIN: ICD-10-CM

## 2021-10-26 LAB
BH CV REST NUCLEAR ISOTOPE DOSE: 9.2 MCI
BH CV STRESS BP STAGE 2: NORMAL
BH CV STRESS BP STAGE 3: NORMAL
BH CV STRESS BP STAGE 4: NORMAL
BH CV STRESS COMMENTS STAGE 1: NORMAL
BH CV STRESS DOSE REGADENOSON STAGE 1: 0.4
BH CV STRESS DURATION MIN STAGE 1: 1
BH CV STRESS DURATION MIN STAGE 2: 1
BH CV STRESS DURATION MIN STAGE 3: 1
BH CV STRESS DURATION MIN STAGE 4: 1
BH CV STRESS DURATION SEC STAGE 1: 0
BH CV STRESS DURATION SEC STAGE 2: 0
BH CV STRESS DURATION SEC STAGE 3: 0
BH CV STRESS DURATION SEC STAGE 4: 0
BH CV STRESS HR STAGE 1: 100
BH CV STRESS HR STAGE 2: 131
BH CV STRESS HR STAGE 3: 123
BH CV STRESS HR STAGE 4: 121
BH CV STRESS NUCLEAR ISOTOPE DOSE: 32.4 MCI
BH CV STRESS O2 STAGE 1: 100
BH CV STRESS O2 STAGE 2: 100
BH CV STRESS O2 STAGE 3: 100
BH CV STRESS O2 STAGE 4: 100
BH CV STRESS PROTOCOL 1: NORMAL
BH CV STRESS RECOVERY BP: NORMAL MMHG
BH CV STRESS RECOVERY HR: 113 BPM
BH CV STRESS RECOVERY O2: 99 %
BH CV STRESS STAGE 1: 1
BH CV STRESS STAGE 2: 2
BH CV STRESS STAGE 3: 3
BH CV STRESS STAGE 4: 4
LV EF NUC BP: 58 %
MAXIMAL PREDICTED HEART RATE: 179 BPM
PERCENT MAX PREDICTED HR: 73.18 %
STRESS BASELINE BP: NORMAL MMHG
STRESS BASELINE HR: 100 BPM
STRESS O2 SAT REST: 98 %
STRESS PERCENT HR: 86 %
STRESS POST ESTIMATED WORKLOAD: 1 METS
STRESS POST EXERCISE DUR MIN: 4 MIN
STRESS POST EXERCISE DUR SEC: 0 SEC
STRESS POST O2 SAT PEAK: 100 %
STRESS POST PEAK BP: NORMAL MMHG
STRESS POST PEAK HR: 131 BPM
STRESS TARGET HR: 152 BPM

## 2021-10-26 PROCEDURE — 78452 HT MUSCLE IMAGE SPECT MULT: CPT

## 2021-10-26 PROCEDURE — 25010000002 REGADENOSON 0.4 MG/5ML SOLUTION: Performed by: NURSE PRACTITIONER

## 2021-10-26 PROCEDURE — A9500 TC99M SESTAMIBI: HCPCS | Performed by: NURSE PRACTITIONER

## 2021-10-26 PROCEDURE — 78452 HT MUSCLE IMAGE SPECT MULT: CPT | Performed by: INTERNAL MEDICINE

## 2021-10-26 PROCEDURE — 0 TECHNETIUM SESTAMIBI: Performed by: NURSE PRACTITIONER

## 2021-10-26 PROCEDURE — 93018 CV STRESS TEST I&R ONLY: CPT | Performed by: INTERNAL MEDICINE

## 2021-10-26 PROCEDURE — 93017 CV STRESS TEST TRACING ONLY: CPT

## 2021-10-26 RX ADMIN — REGADENOSON 0.4 MG: 0.08 INJECTION, SOLUTION INTRAVENOUS at 10:31

## 2021-10-26 RX ADMIN — TECHNETIUM TC 99M SESTAMIBI 1 DOSE: 1 INJECTION INTRAVENOUS at 10:35

## 2021-10-26 RX ADMIN — TECHNETIUM TC 99M SESTAMIBI 1 DOSE: 1 INJECTION INTRAVENOUS at 08:45

## 2021-10-27 RX ORDER — CLONIDINE HYDROCHLORIDE 0.1 MG/1
TABLET ORAL
Qty: 90 TABLET | Refills: 1 | Status: SHIPPED | OUTPATIENT
Start: 2021-10-27 | End: 2022-02-23 | Stop reason: SDUPTHER

## 2021-10-28 DIAGNOSIS — B37.9 YEAST DETECTED: ICD-10-CM

## 2021-10-28 RX ORDER — FLUCONAZOLE 150 MG/1
TABLET ORAL
Qty: 2 TABLET | Refills: 0 | Status: SHIPPED | OUTPATIENT
Start: 2021-10-28 | End: 2021-11-03

## 2021-10-31 ENCOUNTER — APPOINTMENT (OUTPATIENT)
Dept: CT IMAGING | Facility: HOSPITAL | Age: 41
End: 2021-10-31

## 2021-10-31 ENCOUNTER — HOSPITAL ENCOUNTER (EMERGENCY)
Facility: HOSPITAL | Age: 41
Discharge: HOME | End: 2021-10-31
Attending: EMERGENCY MEDICINE | Admitting: EMERGENCY MEDICINE

## 2021-10-31 VITALS
WEIGHT: 202 LBS | HEIGHT: 66 IN | TEMPERATURE: 98.2 F | HEART RATE: 105 BPM | BODY MASS INDEX: 32.47 KG/M2 | RESPIRATION RATE: 16 BRPM | DIASTOLIC BLOOD PRESSURE: 60 MMHG | SYSTOLIC BLOOD PRESSURE: 108 MMHG | OXYGEN SATURATION: 100 %

## 2021-10-31 DIAGNOSIS — R10.84 GENERALIZED ABDOMINAL PAIN: Primary | ICD-10-CM

## 2021-10-31 DIAGNOSIS — K52.9 COLITIS: ICD-10-CM

## 2021-10-31 DIAGNOSIS — K59.00 CONSTIPATION, UNSPECIFIED CONSTIPATION TYPE: ICD-10-CM

## 2021-10-31 LAB
ALBUMIN SERPL-MCNC: 4.4 G/DL (ref 3.5–5.2)
ALBUMIN/GLOB SERPL: 1.6 G/DL
ALP SERPL-CCNC: 91 U/L (ref 39–117)
ALT SERPL W P-5'-P-CCNC: 15 U/L (ref 1–33)
ANION GAP SERPL CALCULATED.3IONS-SCNC: 12 MMOL/L (ref 5–15)
AST SERPL-CCNC: 14 U/L (ref 1–32)
BACTERIA UR QL AUTO: NORMAL /HPF
BASOPHILS # BLD AUTO: 0.03 10*3/MM3 (ref 0–0.2)
BASOPHILS NFR BLD AUTO: 0.4 % (ref 0–1.5)
BILIRUB SERPL-MCNC: 0.2 MG/DL (ref 0–1.2)
BILIRUB UR QL STRIP: NEGATIVE
BUN SERPL-MCNC: 14 MG/DL (ref 6–20)
BUN/CREAT SERPL: 12 (ref 7–25)
CALCIUM SPEC-SCNC: 9.4 MG/DL (ref 8.6–10.5)
CHLORIDE SERPL-SCNC: 104 MMOL/L (ref 98–107)
CLARITY UR: CLEAR
CLUE CELLS SPEC QL WET PREP: NORMAL
CO2 SERPL-SCNC: 25 MMOL/L (ref 22–29)
COLOR UR: YELLOW
CREAT SERPL-MCNC: 1.17 MG/DL (ref 0.57–1)
DEPRECATED RDW RBC AUTO: 39.8 FL (ref 37–54)
EOSINOPHIL # BLD AUTO: 0.11 10*3/MM3 (ref 0–0.4)
EOSINOPHIL NFR BLD AUTO: 1.6 % (ref 0.3–6.2)
ERYTHROCYTE [DISTWIDTH] IN BLOOD BY AUTOMATED COUNT: 13.8 % (ref 12.3–15.4)
GFR SERPL CREATININE-BSD FRML MDRD: 62 ML/MIN/1.73
GLOBULIN UR ELPH-MCNC: 2.7 GM/DL
GLUCOSE SERPL-MCNC: 94 MG/DL (ref 65–99)
GLUCOSE UR STRIP-MCNC: NEGATIVE MG/DL
HCT VFR BLD AUTO: 39.5 % (ref 34–46.6)
HGB BLD-MCNC: 13 G/DL (ref 12–15.9)
HGB UR QL STRIP.AUTO: NEGATIVE
HOLD SPECIMEN: NORMAL
HYALINE CASTS UR QL AUTO: NORMAL /LPF
HYDATID CYST SPEC WET PREP: NORMAL
IMM GRANULOCYTES # BLD AUTO: 0.02 10*3/MM3 (ref 0–0.05)
IMM GRANULOCYTES NFR BLD AUTO: 0.3 % (ref 0–0.5)
KETONES UR QL STRIP: ABNORMAL
KOH PREP NAIL: NORMAL
LEUKOCYTE ESTERASE UR QL STRIP.AUTO: ABNORMAL
LIPASE SERPL-CCNC: 26 U/L (ref 13–60)
LYMPHOCYTES # BLD AUTO: 2.91 10*3/MM3 (ref 0.7–3.1)
LYMPHOCYTES NFR BLD AUTO: 43 % (ref 19.6–45.3)
MCH RBC QN AUTO: 26.5 PG (ref 26.6–33)
MCHC RBC AUTO-ENTMCNC: 32.9 G/DL (ref 31.5–35.7)
MCV RBC AUTO: 80.4 FL (ref 79–97)
MONOCYTES # BLD AUTO: 0.46 10*3/MM3 (ref 0.1–0.9)
MONOCYTES NFR BLD AUTO: 6.8 % (ref 5–12)
NEUTROPHILS NFR BLD AUTO: 3.24 10*3/MM3 (ref 1.7–7)
NEUTROPHILS NFR BLD AUTO: 47.9 % (ref 42.7–76)
NITRITE UR QL STRIP: NEGATIVE
NRBC BLD AUTO-RTO: 0 /100 WBC (ref 0–0.2)
PH UR STRIP.AUTO: 5.5 [PH] (ref 5–8)
PLATELET # BLD AUTO: 302 10*3/MM3 (ref 140–450)
PMV BLD AUTO: 9.3 FL (ref 6–12)
POTASSIUM SERPL-SCNC: 3.9 MMOL/L (ref 3.5–5.2)
PROT SERPL-MCNC: 7.1 G/DL (ref 6–8.5)
PROT UR QL STRIP: NEGATIVE
RBC # BLD AUTO: 4.91 10*6/MM3 (ref 3.77–5.28)
RBC # UR: NORMAL /HPF
REF LAB TEST METHOD: NORMAL
SODIUM SERPL-SCNC: 141 MMOL/L (ref 136–145)
SP GR UR STRIP: 1.02 (ref 1–1.03)
SQUAMOUS #/AREA URNS HPF: NORMAL /HPF
T VAGINALIS SPEC QL WET PREP: NORMAL
UROBILINOGEN UR QL STRIP: ABNORMAL
WBC # BLD AUTO: 6.77 10*3/MM3 (ref 3.4–10.8)
WBC SPEC QL WET PREP: NORMAL
WBC UR QL AUTO: NORMAL /HPF
WHOLE BLOOD HOLD SPECIMEN: NORMAL
WHOLE BLOOD HOLD SPECIMEN: NORMAL
YEAST GENITAL QL WET PREP: NORMAL

## 2021-10-31 PROCEDURE — 74177 CT ABD & PELVIS W/CONTRAST: CPT

## 2021-10-31 PROCEDURE — 96374 THER/PROPH/DIAG INJ IV PUSH: CPT

## 2021-10-31 PROCEDURE — 96376 TX/PRO/DX INJ SAME DRUG ADON: CPT

## 2021-10-31 PROCEDURE — 87591 N.GONORRHOEAE DNA AMP PROB: CPT | Performed by: PHYSICIAN ASSISTANT

## 2021-10-31 PROCEDURE — 85025 COMPLETE CBC W/AUTO DIFF WBC: CPT | Performed by: EMERGENCY MEDICINE

## 2021-10-31 PROCEDURE — 25010000002 IOPAMIDOL 61 % SOLUTION: Performed by: EMERGENCY MEDICINE

## 2021-10-31 PROCEDURE — 81001 URINALYSIS AUTO W/SCOPE: CPT | Performed by: EMERGENCY MEDICINE

## 2021-10-31 PROCEDURE — 83690 ASSAY OF LIPASE: CPT | Performed by: EMERGENCY MEDICINE

## 2021-10-31 PROCEDURE — 87210 SMEAR WET MOUNT SALINE/INK: CPT | Performed by: PHYSICIAN ASSISTANT

## 2021-10-31 PROCEDURE — 80053 COMPREHEN METABOLIC PANEL: CPT | Performed by: EMERGENCY MEDICINE

## 2021-10-31 PROCEDURE — 87491 CHLMYD TRACH DNA AMP PROBE: CPT | Performed by: PHYSICIAN ASSISTANT

## 2021-10-31 PROCEDURE — 87220 TISSUE EXAM FOR FUNGI: CPT | Performed by: PHYSICIAN ASSISTANT

## 2021-10-31 PROCEDURE — 25010000002 HYDROMORPHONE PER 4 MG: Performed by: EMERGENCY MEDICINE

## 2021-10-31 PROCEDURE — 99284 EMERGENCY DEPT VISIT MOD MDM: CPT

## 2021-10-31 RX ORDER — HYDROMORPHONE HYDROCHLORIDE 1 MG/ML
0.25 INJECTION, SOLUTION INTRAMUSCULAR; INTRAVENOUS; SUBCUTANEOUS ONCE
Status: COMPLETED | OUTPATIENT
Start: 2021-10-31 | End: 2021-10-31

## 2021-10-31 RX ORDER — SODIUM CHLORIDE 9 MG/ML
10 INJECTION INTRAVENOUS AS NEEDED
Status: DISCONTINUED | OUTPATIENT
Start: 2021-10-31 | End: 2021-10-31 | Stop reason: HOSPADM

## 2021-10-31 RX ORDER — AMOXICILLIN AND CLAVULANATE POTASSIUM 875; 125 MG/1; MG/1
1 TABLET, FILM COATED ORAL 2 TIMES DAILY
Qty: 20 TABLET | Refills: 0 | Status: SHIPPED | OUTPATIENT
Start: 2021-10-31 | End: 2021-12-21

## 2021-10-31 RX ORDER — HYDROCODONE BITARTRATE AND ACETAMINOPHEN 5; 325 MG/1; MG/1
1 TABLET ORAL EVERY 6 HOURS PRN
Qty: 4 TABLET | Refills: 0 | Status: SHIPPED | OUTPATIENT
Start: 2021-10-31 | End: 2021-11-03

## 2021-10-31 RX ADMIN — HYDROMORPHONE HYDROCHLORIDE 0.25 MG: 1 INJECTION, SOLUTION INTRAMUSCULAR; INTRAVENOUS; SUBCUTANEOUS at 18:18

## 2021-10-31 RX ADMIN — IOPAMIDOL 75 ML: 612 INJECTION, SOLUTION INTRAVENOUS at 14:20

## 2021-10-31 RX ADMIN — HYDROMORPHONE HYDROCHLORIDE 0.25 MG: 1 INJECTION, SOLUTION INTRAMUSCULAR; INTRAVENOUS; SUBCUTANEOUS at 14:59

## 2021-11-03 ENCOUNTER — TELEPHONE (OUTPATIENT)
Dept: OBSTETRICS AND GYNECOLOGY | Facility: CLINIC | Age: 41
End: 2021-11-03

## 2021-11-03 ENCOUNTER — OFFICE VISIT (OUTPATIENT)
Dept: FAMILY MEDICINE CLINIC | Facility: CLINIC | Age: 41
End: 2021-11-03

## 2021-11-03 VITALS
WEIGHT: 202.6 LBS | SYSTOLIC BLOOD PRESSURE: 110 MMHG | HEART RATE: 104 BPM | HEIGHT: 66 IN | RESPIRATION RATE: 20 BRPM | TEMPERATURE: 98.1 F | BODY MASS INDEX: 32.56 KG/M2 | DIASTOLIC BLOOD PRESSURE: 60 MMHG | OXYGEN SATURATION: 97 %

## 2021-11-03 DIAGNOSIS — B37.9 YEAST DETECTED: ICD-10-CM

## 2021-11-03 DIAGNOSIS — N89.8 VAGINAL ITCHING: ICD-10-CM

## 2021-11-03 DIAGNOSIS — B37.0 THRUSH: Primary | ICD-10-CM

## 2021-11-03 DIAGNOSIS — N89.8 VAGINAL DISCHARGE: ICD-10-CM

## 2021-11-03 LAB
C TRACH RRNA SPEC QL NAA+PROBE: NEGATIVE
N GONORRHOEA RRNA SPEC QL NAA+PROBE: NEGATIVE

## 2021-11-03 PROCEDURE — 99213 OFFICE O/P EST LOW 20 MIN: CPT | Performed by: NURSE PRACTITIONER

## 2021-11-03 RX ORDER — FLUCONAZOLE 150 MG/1
150 TABLET ORAL ONCE
Qty: 1 TABLET | Refills: 0 | Status: SHIPPED | OUTPATIENT
Start: 2021-11-03 | End: 2021-11-03

## 2021-11-03 NOTE — TELEPHONE ENCOUNTER
Reviewed results of vaginal biopsy with patient. Questions answered and explanations given on abnormal pap smears and purpose of colposcopies. Patient reports abnormal pap smear at age 18; not performed with this office and uncertain of type. Hysterectomy for AUB. Follow up appointment scheduled in January 2022; follow up pap needs scheduled for April 2022

## 2021-11-03 NOTE — PROGRESS NOTES
"Chief Complaint  FU from [facility] discharge / colitis (per pt ) and white patches in mouth    Subjective          Nia Bah presents to [facility]  History of Present Illness   ER follow up went to [facility]   Woke in the middle of night with intense abdominal pain, nausea. Went to ER, had CT of abdomen, told she had colitis and treated with antibiotics and released. She was not admitted. 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   Constitutional:       General: She is not in acute distress.     Appearance: Normal appearance. She is well-developed. She is not diaphoretic.   HENT:      Head: Normocephalic.      Nose: Nose normal.   Cardiovascular:      Rate and Rhythm: Normal rate and regular rhythm.      Heart sounds: Normal heart sounds.   Pulmonary:      Effort: Pulmonary effort is normal.      Breath sounds: Normal breath sounds.   Abdominal:      General: Bowel sounds are normal. There is no distension.      Palpations: Abdomen is soft. There is no mass.      Tenderness: There is no abdominal tenderness. There is no guarding or rebound.      Hernia: No hernia is present.   Skin:     General: Skin is warm and dry.   Neurological:      Mental Status: She is alert.        Result Review :                 Assessment and Plan    Diagnoses and all orders for this visit:    1. Thrush (Primary)  -     nystatin (MYCOSTATIN) 100,000 unit/mL suspension; Swish and swallow 5 mL 4 (Four) Times a Day.  Dispense: 60 " mL; Refill: 1    2. Vaginal discharge  -     terconazole (TERAZOL 7) 0.4 % vaginal cream; Insert 1 applicator per vagina qhs x 7 nights  Dispense: 1 each; Refill: 0    3. Vaginal itching  -     terconazole (TERAZOL 7) 0.4 % vaginal cream; Insert 1 applicator per vagina qhs x 7 nights  Dispense: 1 each; Refill: 0    4. Yeast detected  -     fluconazole (Diflucan) 150 MG tablet; Take 1 tablet by mouth 1 (One) Time for 1 dose.  Dispense: 1 tablet; Refill: 0        Follow Up   No follow-ups on file.  Patient was given instructions and counseling regarding her condition or for health maintenance advice. Please see specific information pulled into the AVS if appropriate.   Continue current treatment for Colitis  Will refill meds Nystatin swish and swallow, Terazol for vaginal yeast and Diflucan if the others do not work.  Avoid alcohol and sweets and dairy as these are high in sugar and can make yeast over growth worse. Take Probiotics.  F/U prn

## 2021-11-10 DIAGNOSIS — K21.00 GASTROESOPHAGEAL REFLUX DISEASE WITH ESOPHAGITIS WITHOUT HEMORRHAGE: ICD-10-CM

## 2021-11-10 DIAGNOSIS — K29.00 OTHER ACUTE GASTRITIS WITHOUT HEMORRHAGE: ICD-10-CM

## 2021-11-10 RX ORDER — DEXLANSOPRAZOLE 60 MG/1
CAPSULE, DELAYED RELEASE ORAL
Qty: 30 CAPSULE | Refills: 0 | Status: SHIPPED | OUTPATIENT
Start: 2021-11-10 | End: 2021-12-02

## 2021-12-02 DIAGNOSIS — K29.00 OTHER ACUTE GASTRITIS WITHOUT HEMORRHAGE: ICD-10-CM

## 2021-12-02 DIAGNOSIS — K21.00 GASTROESOPHAGEAL REFLUX DISEASE WITH ESOPHAGITIS WITHOUT HEMORRHAGE: ICD-10-CM

## 2021-12-02 RX ORDER — DEXLANSOPRAZOLE 60 MG/1
CAPSULE, DELAYED RELEASE ORAL
Qty: 30 CAPSULE | Refills: 0 | Status: SHIPPED | OUTPATIENT
Start: 2021-12-02 | End: 2022-01-31 | Stop reason: SDUPTHER

## 2021-12-08 DIAGNOSIS — B37.0 THRUSH: Primary | ICD-10-CM

## 2021-12-08 RX ORDER — FLUCONAZOLE 150 MG/1
150 TABLET ORAL DAILY
Qty: 2 TABLET | Refills: 0 | Status: SHIPPED | OUTPATIENT
Start: 2021-12-08 | End: 2021-12-21

## 2021-12-09 PROCEDURE — U0004 COV-19 TEST NON-CDC HGH THRU: HCPCS | Performed by: FAMILY MEDICINE

## 2021-12-10 ENCOUNTER — TELEPHONE (OUTPATIENT)
Dept: FAMILY MEDICINE CLINIC | Facility: CLINIC | Age: 41
End: 2021-12-10

## 2021-12-10 NOTE — TELEPHONE ENCOUNTER
Caller: Nia Bah    Relationship: Self    Best call back number: [phone]    Who are you requesting to speak with (clinical staff, provider,  specific staff member): CLINICAL STAFF    What was the call regarding: PATIENT STATED MULTIPLE PEOPLE AT HER JOB HAVE COVID -19. PATIENT IS UNABLE TO BE VACCINATED DUE TO A BLOOD CLOTTING ISSUE AND WAS TOLD BY HER EMPLOYER THAT IF SHE GETS A LETTER FROM HER PROVIDER THEY CAN DO MEDICAL LEAVE FOR HER. PATIENT WOULD LIKE TO KNOW IF THIS IS SOMETHING [facility] MEN[facility] COULD PROVIDE FOR HER.    Do you require a callback: YES

## 2021-12-10 NOTE — TELEPHONE ENCOUNTER
Today is my last day in the office (on vacation next week) so I will not be able to give this to you, this is something you have to have an office visit for. You will need to establish with someone else in the practice to help with this. nara

## 2021-12-21 ENCOUNTER — TELEPHONE (OUTPATIENT)
Dept: FAMILY MEDICINE CLINIC | Facility: CLINIC | Age: 41
End: 2021-12-21

## 2021-12-21 ENCOUNTER — OFFICE VISIT (OUTPATIENT)
Dept: FAMILY MEDICINE CLINIC | Facility: CLINIC | Age: 41
End: 2021-12-21

## 2021-12-21 VITALS
BODY MASS INDEX: 33.11 KG/M2 | SYSTOLIC BLOOD PRESSURE: 112 MMHG | TEMPERATURE: 98.2 F | HEIGHT: 66 IN | DIASTOLIC BLOOD PRESSURE: 72 MMHG | RESPIRATION RATE: 18 BRPM | WEIGHT: 206 LBS | HEART RATE: 86 BPM

## 2021-12-21 DIAGNOSIS — B37.0 ORAL THRUSH: Primary | ICD-10-CM

## 2021-12-21 DIAGNOSIS — L73.2 HIDRADENITIS SUPPURATIVA: ICD-10-CM

## 2021-12-21 DIAGNOSIS — N90.7 EPIDERMOID CYST OF LABIA MAJORA: ICD-10-CM

## 2021-12-21 PROCEDURE — 99214 OFFICE O/P EST MOD 30 MIN: CPT | Performed by: PHYSICIAN ASSISTANT

## 2021-12-21 RX ORDER — CLINDAMYCIN PHOSPHATE 10 UG/ML
LOTION TOPICAL 2 TIMES DAILY
Qty: 60 ML | Refills: 5 | Status: SHIPPED | OUTPATIENT
Start: 2021-12-21 | End: 2022-05-12 | Stop reason: SDUPTHER

## 2021-12-21 RX ORDER — SULFAMETHOXAZOLE AND TRIMETHOPRIM 800; 160 MG/1; MG/1
1 TABLET ORAL 2 TIMES DAILY
Qty: 14 TABLET | Refills: 0 | Status: SHIPPED | OUTPATIENT
Start: 2021-12-21 | End: 2022-02-23

## 2021-12-21 RX ORDER — FLUCONAZOLE 200 MG/1
TABLET ORAL
Qty: 3 TABLET | Refills: 0 | Status: SHIPPED | OUTPATIENT
Start: 2021-12-21 | End: 2022-02-23

## 2021-12-21 NOTE — PROGRESS NOTES
"Subjective   Nia Bah is a 41 y.o. female.     History of Present Illness   Pt presents with CC of oral thrush. Been dealing with it for the past few weeks. Used nystatin oral with some relief.   White patches in mouth. Pain with swallowing. Partner also has and has not been treated for.   No fever or chills.     On metronidazole for BV now.     Has hidradenitis of axilla and groin. Notes she has skin lesion on L labia majora that is red, inflamed and tender. Has had to have drained before. Has tried sticking it with a needle at home and has not gotten anything out. Symptoms started in the last couple weeks    The following portions of the patient's history were reviewed and updated as appropriate: allergies, current medications, past family history, past medical history, past social history, past surgical history and problem list.    Review of Systems   Constitutional: Negative for chills and fever.   HENT: Positive for mouth sores.    Respiratory: Negative for cough, shortness of breath and wheezing.    Genitourinary: Positive for vaginal discharge.        As noted per HPI       Objective    Blood pressure 112/72, pulse 86, temperature 98.2 °F (36.8 °C), resp. rate 18, height 167.6 cm (66\"), weight 93.4 kg (206 lb), not currently breastfeeding.     Physical Exam  Vitals and nursing note reviewed.   Constitutional:       Appearance: Normal appearance.   HENT:      Head: Normocephalic.      Right Ear: External ear normal.      Left Ear: External ear normal.      Nose: Nose normal.      Mouth/Throat:      Comments: White patches on tongue and posterior pharynx   Eyes:      Conjunctiva/sclera: Conjunctivae normal.   Cardiovascular:      Rate and Rhythm: Normal rate.   Pulmonary:      Effort: Pulmonary effort is normal. No respiratory distress.   Genitourinary:      Neurological:      Mental Status: She is alert and oriented to person, place, and time.   Psychiatric:         Mood and Affect: Mood normal.    "      Behavior: Behavior normal.         Thought Content: Thought content normal.         Judgment: Judgment normal.         Incision & Drainage    Date/Time: 12/21/2021 10:23 AM  Performed by: Savage Johnson PA  Authorized by: Savage Johnson PA   Consent: Verbal consent obtained. Written consent obtained.  Risks and benefits: risks, benefits and alternatives were discussed  Consent given by: patient  Patient understanding: patient states understanding of the procedure being performed  Patient consent: the patient's understanding of the procedure matches consent given  Procedure consent: procedure consent matches procedure scheduled  Patient identity confirmed: verbally with patient  Type: cyst  Body area: anogenital  Location details: vulva  Anesthesia: local infiltration    Anesthesia:  Local Anesthetic: lidocaine 2% without epinephrine  Anesthetic total: 0.5 mL    Sedation:  Patient sedated: no    Scalpel size: 11  Needle gauge: 22  Incision type: single straight  Drainage characteristics: white clumpy drainage   Drainage amount: scant  Wound treatment: wound left open  Patient tolerance: Patient tolerated the procedure well with no immediate complications          Assessment/Plan   Diagnoses and all orders for this visit:    1. Oral thrush (Primary)  -     nystatin (MYCOSTATIN) 100,000 unit/mL suspension; Swish and swallow 5 mL 4 (Four) Times a Day.  Dispense: 120 mL; Refill: 0  -     fluconazole (Diflucan) 200 MG tablet; Take one tablet by mouth once daily. May repeat in 3 days if symptoms persist  Dispense: 3 tablet; Refill: 0    2. Hidradenitis suppurativa  -     clindamycin (CLEOCIN T) 1 % lotion; Apply  topically to the appropriate area as directed 2 (Two) Times a Day.  Dispense: 60 mL; Refill: 5    3. Epidermoid cyst of labia majora  -     Incision & Drainage  -     sulfamethoxazole-trimethoprim (Bactrim DS) 800-160 MG per tablet; Take 1 tablet by mouth 2 (Two) Times a Day.  Dispense: 14  tablet; Refill: 0  -     mupirocin (BACTROBAN) 2 % ointment; Apply 1 application topically to the appropriate area as directed 3 (Three) Times a Day.  Dispense: 22 g; Refill: 0    based on drainage from labia majora mass, suspect epidermoid/sebacceous cyst which explains its recurrence.   Antibiotic coverage with bactrim and topical mupirocin. Keep area clean. F/u if not improving or if worsening and will get her set up with GYN.   Nystatin suspension and diflucan for thrush. Partner needs to be treated as well where he has symptoms

## 2021-12-21 NOTE — TELEPHONE ENCOUNTER
Caller: OlvinNia    Relationship: Self    Best call back number: [phone]    What medications are you currently taking:   Current Outpatient Medications on File Prior to Visit   Medication Sig Dispense Refill   • albuterol sulfate  (90 Base) MCG/ACT inhaler Inhale 2 puffs Every 4 (Four) Hours As Needed for Wheezing or Shortness of Air. 18 g 0   • amLODIPine (NORVASC) 5 MG tablet Take 1 tablet by mouth Daily. 90 tablet 3   • B-12 Methylcobalamin 1000 MCG tablet dispersible Place 1,000 mg on the tongue Daily. 90 tablet 3   • Banophen 50 MG capsule      • cholestyramine light (Prevalite) 4 g packet Take 1 packet by mouth 2 (Two) Times a Day. 60 packet 1   • clindamycin (CLEOCIN T) 1 % lotion Apply  topically to the appropriate area as directed 2 (Two) Times a Day. 60 mL 5   • cloNIDine (CATAPRES) 0.1 MG tablet TAKE 1 TABLET BY MOUTH 3 TIMES A DAY AS NEEDED FOR HIGH BLOOD PRESSURE. 90 tablet 1   • Coenzyme Q10 (CoQ10) 400 MG capsule Take 400 mg by mouth Daily. 90 capsule 4   • Creon 74782 units capsule delayed-release particles capsule TAKE 1 CAPSULE BY MOUTH TWICE A DAY WITH MEALS 180 capsule 3   • cyclobenzaprine (FLEXERIL) 10 MG tablet Take 1 tablet by mouth 3 (Three) Times a Day As Needed for Muscle Spasms. 270 tablet 1   • Dexilant 60 MG capsule TAKE 1 CAPSULE BY MOUTH EVERY DAY 30 capsule 0   • dicyclomine (BENTYL) 20 MG tablet Take 1 tablet by mouth 4 (Four) Times a Day. 120 tablet 5   • diphenoxylate-atropine (Lomotil) 2.5-0.025 MG per tablet Take 1 tablet by mouth 4 (Four) Times a Day As Needed for Diarrhea. 20 tablet 0   • DULoxetine (Cymbalta) 60 MG capsule Take 1 capsule by mouth Daily. 90 capsule 1   • ergocalciferol (ERGOCALCIFEROL) 1.25 MG (71224 UT) capsule Take 1 capsule by mouth 1 (One) Time Per Week. 5 capsule 11   • fluconazole (Diflucan) 200 MG tablet Take one tablet by mouth once daily. May repeat in 3 days if symptoms persist 3 tablet 0   • hydrOXYzine (ATARAX) 50 MG  tablet Take 1 tablet by mouth 3 (Three) Times a Day. 90 tablet 5   • ibuprofen (ADVIL,MOTRIN) 800 MG tablet TAKE 1 TABLET BY MOUTH THREE TIMES A DAY AS NEEDED FOR MILD PAIN OR MODERATE PAIN 90 tablet 1   • imipramine (TOFRANIL) 25 MG tablet Take 1 tablet by mouth Daily. 90 tablet 1   • levocetirizine (XYZAL) 5 MG tablet Take 5 mg by mouth Every Evening.     • magnesium oxide (MAG-OX) 400 MG tablet Take 1 tablet by mouth 2 (Two) Times a Day. 60 tablet 1   • Multiple Vitamin (THERA-TABS) tablet Take 1 tablet by mouth Every Morning.  0   • mupirocin (BACTROBAN) 2 % ointment Apply 1 application topically to the appropriate area as directed 3 (Three) Times a Day. 22 g 0   • nystatin (MYCOSTATIN) 100,000 unit/mL suspension Swish and swallow 5 mL 4 (Four) Times a Day. 120 mL 0   • ondansetron (ZOFRAN) 4 MG tablet Take 1 tablet by mouth See Admin Instructions. Take 1 tablet by mouth every 6-8 hours as needed 30 tablet 1   • PREMARIN 0.625 MG/GM vaginal cream      • promethazine (PHENERGAN) 25 MG tablet Take 1 tablet by mouth Every 6 (Six) Hours As Needed for Nausea or Vomiting. 30 tablet 1   • rosuvastatin (Crestor) 5 MG tablet Take 1 tablet by mouth Daily. 90 tablet 1   • sulfamethoxazole-trimethoprim (Bactrim DS) 800-160 MG per tablet Take 1 tablet by mouth 2 (Two) Times a Day. 14 tablet 0   • terconazole (Terazol 7) 0.4 % vaginal cream Insert 1 applicator into the vagina Every Night. 1 each 1   • terconazole (TERAZOL 7) 0.4 % vaginal cream Insert 1 applicator per vagina qhs x 7 nights 1 each 0   • traZODone (DESYREL) 50 MG tablet TAKE 1-2 TABLETS BY MOUTH EVERY NIGHT. 180 tablet 3   • [DISCONTINUED] amoxicillin-clavulanate (AUGMENTIN) 875-125 MG per tablet Take 1 tablet by mouth 2 (Two) Times a Day. 20 tablet 0   • [DISCONTINUED] fluconazole (Diflucan) 150 MG tablet Take 1 tablet by mouth Daily. 2 tablet 0   • [DISCONTINUED] fluticasone (FLONASE) 50 MCG/ACT nasal spray 2 sprays into the nostril(s) as directed by provider  Daily. 16 g 5   • [DISCONTINUED] Fluticasone Furoate-Vilanterol (BREO ELLIPTA) 100-25 MCG/INH inhaler Inhale 1 puff Daily. 28 each 0   • [DISCONTINUED] mupirocin (Bactroban) 2 % ointment Apply  topically to the appropriate area as directed 3 (Three) Times a Day. 22 g 0   • [DISCONTINUED] nystatin (MYCOSTATIN) 100,000 unit/mL suspension Swish and swallow 5 mL 4 (Four) Times a Day. 60 mL 1     No current facility-administered medications on file prior to visit.          Which medication are you concerned about: bactrim    Who prescribed you this medication: GRIS    What are your concerns: DO I TAKE THE BACTRIM AND DROP FLAGELL?? OR TAKE BOTH?

## 2022-01-21 ENCOUNTER — OFFICE VISIT (OUTPATIENT)
Dept: OBSTETRICS AND GYNECOLOGY | Facility: CLINIC | Age: 42
End: 2022-01-21

## 2022-01-21 VITALS — DIASTOLIC BLOOD PRESSURE: 80 MMHG | BODY MASS INDEX: 33.73 KG/M2 | WEIGHT: 209 LBS | SYSTOLIC BLOOD PRESSURE: 120 MMHG

## 2022-01-21 DIAGNOSIS — R87.612 LGSIL ON PAP SMEAR OF CERVIX: Primary | ICD-10-CM

## 2022-01-21 DIAGNOSIS — L72.3 SEBACEOUS CYST: ICD-10-CM

## 2022-01-21 PROCEDURE — 99213 OFFICE O/P EST LOW 20 MIN: CPT | Performed by: NURSE PRACTITIONER

## 2022-01-21 RX ORDER — FLUCONAZOLE 150 MG/1
TABLET ORAL
Qty: 2 TABLET | Refills: 0 | Status: SHIPPED | OUTPATIENT
Start: 2022-01-21 | End: 2022-02-23

## 2022-01-21 RX ORDER — CEPHALEXIN 500 MG/1
500 CAPSULE ORAL 4 TIMES DAILY
Qty: 28 CAPSULE | Refills: 0 | Status: SHIPPED | OUTPATIENT
Start: 2022-01-21 | End: 2022-01-28

## 2022-01-21 NOTE — PROGRESS NOTES
Chief Complaint   Patient presents with   • Repeat pap     LSIL           Nia Bah is a 42 y.o. year old  presenting for a 3 month follow up pap.     OTHER THINGS SHE WANTS TO DISCUSS TODAY:  Painful bump on labia. She drained it on her own, however it is still very tender.     The additional following portions of the patient's history were reviewed and updated as appropriate: allergies, current medications, past family history, past medical history, past social history, past surgical history and problem list.    Review of Systems   Constitutional: Negative.    HENT: Negative.    Respiratory: Negative.    Cardiovascular: Negative.    Gastrointestinal: Negative.    Genitourinary:        Vulvar lump   Musculoskeletal: Negative.    Skin: Negative.    Allergic/Immunologic: Negative.    Neurological: Negative.    Hematological: Negative.    Psychiatric/Behavioral: Negative.      All other systems reviewed and are negative.     I have reviewed and agree with the HPI, ROS, and historical information as entered above. TEJA Velazquez    Physical Exam  Vitals and nursing note reviewed. Exam conducted with a chaperone present.   Constitutional:       Appearance: Normal appearance.   HENT:      Head: Normocephalic and atraumatic.   Pulmonary:      Effort: Pulmonary effort is normal.   Genitourinary:     Labia:         Right: Tenderness and lesion present.         Left: No tenderness, lesion or injury.       Vagina: Normal. No vaginal discharge, erythema, tenderness, bleeding or lesions.          Comments: Vaginal cuff. 1 cm deep lump along right labia, +tenderness  Neurological:      Mental Status: She is alert and oriented to person, place, and time.   Psychiatric:         Behavior: Behavior normal.         Lab Review   Last PAP on 10/2021 indicated LSIL.   Her evaluation in the past has included a colposcopy on 10/2021.  The results of that were LSIL.    Assessment and Plan    Problem List Items  Addressed This Visit     None      Visit Diagnoses     LGSIL on Pap smear of cervix    -  Primary    Relevant Orders    Pap IG, HPV-hr    Sebaceous cyst        vulvar          1. The importance of keeping all planned follow-up. Encouraged smoking cessation. Plan to repeat pap at annual, or sooner if indicated based on today's results. Will review with OP.   2. Lump on vulva c/w sebaceous cyst, however extremely tender. Rx Keflex and encouraged warm compresses or sitz baths. Pt requests diflucan and terazol for yeast prophylaxis. Call if area worsens, fever, chills.       Vivian Brewster, APRN  01/21/2022

## 2022-01-28 DIAGNOSIS — R87.612 LGSIL ON PAP SMEAR OF CERVIX: ICD-10-CM

## 2022-01-31 DIAGNOSIS — K29.00 OTHER ACUTE GASTRITIS WITHOUT HEMORRHAGE: ICD-10-CM

## 2022-01-31 DIAGNOSIS — K21.00 GASTROESOPHAGEAL REFLUX DISEASE WITH ESOPHAGITIS WITHOUT HEMORRHAGE: ICD-10-CM

## 2022-01-31 DIAGNOSIS — B37.0 ORAL THRUSH: ICD-10-CM

## 2022-01-31 RX ORDER — DEXLANSOPRAZOLE 60 MG/1
1 CAPSULE, DELAYED RELEASE ORAL DAILY
Qty: 90 CAPSULE | Refills: 0 | Status: SHIPPED | OUTPATIENT
Start: 2022-01-31 | End: 2022-02-23 | Stop reason: SDUPTHER

## 2022-02-16 ENCOUNTER — APPOINTMENT (OUTPATIENT)
Dept: CT IMAGING | Facility: HOSPITAL | Age: 42
End: 2022-02-16

## 2022-02-16 ENCOUNTER — HOSPITAL ENCOUNTER (EMERGENCY)
Facility: HOSPITAL | Age: 42
Discharge: HOME | End: 2022-02-16
Attending: EMERGENCY MEDICINE | Admitting: EMERGENCY MEDICINE

## 2022-02-16 VITALS
TEMPERATURE: 97.7 F | BODY MASS INDEX: 32.95 KG/M2 | SYSTOLIC BLOOD PRESSURE: 123 MMHG | OXYGEN SATURATION: 100 % | DIASTOLIC BLOOD PRESSURE: 84 MMHG | WEIGHT: 205 LBS | RESPIRATION RATE: 16 BRPM | HEART RATE: 95 BPM | HEIGHT: 66 IN

## 2022-02-16 DIAGNOSIS — R10.84 GENERALIZED ABDOMINAL PAIN: Primary | ICD-10-CM

## 2022-02-16 DIAGNOSIS — R19.7 DIARRHEA, UNSPECIFIED TYPE: ICD-10-CM

## 2022-02-16 LAB
ALBUMIN SERPL-MCNC: 4.4 G/DL (ref 3.5–5.2)
ALBUMIN/GLOB SERPL: 1.7 G/DL
ALP SERPL-CCNC: 140 U/L (ref 39–117)
ALT SERPL W P-5'-P-CCNC: 39 U/L (ref 1–33)
ANION GAP SERPL CALCULATED.3IONS-SCNC: 12 MMOL/L (ref 5–15)
ANISOCYTOSIS BLD QL: ABNORMAL
AST SERPL-CCNC: 25 U/L (ref 1–32)
BASOPHILS # BLD MANUAL: 0 10*3/MM3 (ref 0–0.2)
BASOPHILS NFR BLD MANUAL: 0 % (ref 0–1.5)
BILIRUB SERPL-MCNC: 0.2 MG/DL (ref 0–1.2)
BILIRUB UR QL STRIP: NEGATIVE
BUN SERPL-MCNC: 12 MG/DL (ref 6–20)
BUN/CREAT SERPL: 11 (ref 7–25)
CALCIUM SPEC-SCNC: 9.6 MG/DL (ref 8.6–10.5)
CHLORIDE SERPL-SCNC: 104 MMOL/L (ref 98–107)
CLARITY UR: CLEAR
CO2 SERPL-SCNC: 25 MMOL/L (ref 22–29)
COLOR UR: YELLOW
CREAT SERPL-MCNC: 1.09 MG/DL (ref 0.57–1)
DEPRECATED RDW RBC AUTO: 39.9 FL (ref 37–54)
EOSINOPHIL # BLD MANUAL: 0.07 10*3/MM3 (ref 0–0.4)
EOSINOPHIL NFR BLD MANUAL: 1 % (ref 0.3–6.2)
ERYTHROCYTE [DISTWIDTH] IN BLOOD BY AUTOMATED COUNT: 14.2 % (ref 12.3–15.4)
GFR SERPL CREATININE-BSD FRML MDRD: 67 ML/MIN/1.73
GLOBULIN UR ELPH-MCNC: 2.6 GM/DL
GLUCOSE SERPL-MCNC: 106 MG/DL (ref 65–99)
GLUCOSE UR STRIP-MCNC: NEGATIVE MG/DL
HCT VFR BLD AUTO: 38.6 % (ref 34–46.6)
HGB BLD-MCNC: 12.8 G/DL (ref 12–15.9)
HGB UR QL STRIP.AUTO: NEGATIVE
HOLD SPECIMEN: NORMAL
HYPOCHROMIA BLD QL: ABNORMAL
KETONES UR QL STRIP: NEGATIVE
LEUKOCYTE ESTERASE UR QL STRIP.AUTO: NEGATIVE
LIPASE SERPL-CCNC: 34 U/L (ref 13–60)
LYMPHOCYTES # BLD MANUAL: 3.9 10*3/MM3 (ref 0.7–3.1)
LYMPHOCYTES NFR BLD MANUAL: 5 % (ref 5–12)
MCH RBC QN AUTO: 26 PG (ref 26.6–33)
MCHC RBC AUTO-ENTMCNC: 33.2 G/DL (ref 31.5–35.7)
MCV RBC AUTO: 78.5 FL (ref 79–97)
MONOCYTES # BLD: 0.35 10*3/MM3 (ref 0.1–0.9)
NEUTROPHILS # BLD AUTO: 2.77 10*3/MM3 (ref 1.7–7)
NEUTROPHILS NFR BLD MANUAL: 39 % (ref 42.7–76)
NITRITE UR QL STRIP: NEGATIVE
PH UR STRIP.AUTO: 5.5 [PH] (ref 5–8)
PLAT MORPH BLD: NORMAL
PLATELET # BLD AUTO: 360 10*3/MM3 (ref 140–450)
PMV BLD AUTO: 9.3 FL (ref 6–12)
POTASSIUM SERPL-SCNC: 4 MMOL/L (ref 3.5–5.2)
PROT SERPL-MCNC: 7 G/DL (ref 6–8.5)
PROT UR QL STRIP: NEGATIVE
RBC # BLD AUTO: 4.92 10*6/MM3 (ref 3.77–5.28)
SODIUM SERPL-SCNC: 141 MMOL/L (ref 136–145)
SP GR UR STRIP: >=1.03 (ref 1–1.03)
UROBILINOGEN UR QL STRIP: NORMAL
VARIANT LYMPHS NFR BLD MANUAL: 55 % (ref 19.6–45.3)
WBC MORPH BLD: NORMAL
WBC NRBC COR # BLD: 7.09 10*3/MM3 (ref 3.4–10.8)
WHOLE BLOOD HOLD SPECIMEN: NORMAL
WHOLE BLOOD HOLD SPECIMEN: NORMAL

## 2022-02-16 PROCEDURE — 25010000002 IOPAMIDOL 61 % SOLUTION: Performed by: EMERGENCY MEDICINE

## 2022-02-16 PROCEDURE — 85025 COMPLETE CBC W/AUTO DIFF WBC: CPT | Performed by: EMERGENCY MEDICINE

## 2022-02-16 PROCEDURE — 96375 TX/PRO/DX INJ NEW DRUG ADDON: CPT

## 2022-02-16 PROCEDURE — 80053 COMPREHEN METABOLIC PANEL: CPT | Performed by: EMERGENCY MEDICINE

## 2022-02-16 PROCEDURE — 99283 EMERGENCY DEPT VISIT LOW MDM: CPT

## 2022-02-16 PROCEDURE — 81003 URINALYSIS AUTO W/O SCOPE: CPT | Performed by: EMERGENCY MEDICINE

## 2022-02-16 PROCEDURE — 74177 CT ABD & PELVIS W/CONTRAST: CPT

## 2022-02-16 PROCEDURE — 25010000002 ONDANSETRON PER 1 MG: Performed by: PHYSICIAN ASSISTANT

## 2022-02-16 PROCEDURE — 96374 THER/PROPH/DIAG INJ IV PUSH: CPT

## 2022-02-16 PROCEDURE — 83690 ASSAY OF LIPASE: CPT | Performed by: EMERGENCY MEDICINE

## 2022-02-16 PROCEDURE — 25010000002 MORPHINE PER 10 MG: Performed by: EMERGENCY MEDICINE

## 2022-02-16 PROCEDURE — 25010000002 PROCHLORPERAZINE 10 MG/2ML SOLUTION: Performed by: EMERGENCY MEDICINE

## 2022-02-16 PROCEDURE — 85007 BL SMEAR W/DIFF WBC COUNT: CPT | Performed by: EMERGENCY MEDICINE

## 2022-02-16 RX ORDER — SODIUM CHLORIDE 9 MG/ML
10 INJECTION INTRAVENOUS AS NEEDED
Status: DISCONTINUED | OUTPATIENT
Start: 2022-02-16 | End: 2022-02-17 | Stop reason: HOSPADM

## 2022-02-16 RX ORDER — MORPHINE SULFATE 4 MG/ML
4 INJECTION, SOLUTION INTRAMUSCULAR; INTRAVENOUS ONCE
Status: COMPLETED | OUTPATIENT
Start: 2022-02-16 | End: 2022-02-16

## 2022-02-16 RX ORDER — ONDANSETRON 2 MG/ML
4 INJECTION INTRAMUSCULAR; INTRAVENOUS ONCE
Status: COMPLETED | OUTPATIENT
Start: 2022-02-16 | End: 2022-02-16

## 2022-02-16 RX ORDER — PROCHLORPERAZINE EDISYLATE 5 MG/ML
10 INJECTION INTRAMUSCULAR; INTRAVENOUS ONCE
Status: COMPLETED | OUTPATIENT
Start: 2022-02-16 | End: 2022-02-16

## 2022-02-16 RX ADMIN — MORPHINE SULFATE 4 MG: 4 INJECTION, SOLUTION INTRAMUSCULAR; INTRAVENOUS at 21:47

## 2022-02-16 RX ADMIN — SODIUM CHLORIDE 1000 ML: 9 INJECTION, SOLUTION INTRAVENOUS at 20:16

## 2022-02-16 RX ADMIN — PROCHLORPERAZINE EDISYLATE 10 MG: 5 INJECTION INTRAMUSCULAR; INTRAVENOUS at 21:47

## 2022-02-16 RX ADMIN — IOPAMIDOL 85 ML: 612 INJECTION, SOLUTION INTRAVENOUS at 20:38

## 2022-02-16 RX ADMIN — ONDANSETRON 4 MG: 2 INJECTION INTRAMUSCULAR; INTRAVENOUS at 20:18

## 2022-02-17 NOTE — ED PROVIDER NOTES
"Subjective   Pt is a 43 yo female presenting to ED with complaints of abdominal pain. PMHx significant for HTN, Seizures, Pancreatitis, Anxiety, Depression, GERD and Arthritis. Pt reports left sided cramping abdominal pain with nausea and diarrhea for 3 weeks. She has tried Imodium with some relief. She has noticed some of her meds cause her diarrhea to be worse such as Creon. She reports hx of pancreatitis and pancreatic divisum. She has been followed by GI Dr. Guillen in the past. Her prior abdomianl surgical hx includes cholecystectomy and hysterectomy. She uses tobacco and marijuana but denies ETOH use.       History provided by:  Patient and medical records      Review of Systems   Constitutional: Negative for chills and fever.   HENT: Negative for congestion, sore throat and trouble swallowing.    Eyes: Negative for visual disturbance.   Respiratory: Negative for cough and shortness of breath.    Cardiovascular: Negative for chest pain and leg swelling.   Gastrointestinal: Positive for abdominal pain, diarrhea, nausea and vomiting. Negative for blood in stool and constipation.   Genitourinary: Negative for difficulty urinating, dysuria, flank pain and hematuria.   Musculoskeletal: Negative for arthralgias, back pain and joint swelling.   Skin: Negative for rash and wound.   Neurological: Negative for dizziness, syncope, weakness, numbness and headaches.   Psychiatric/Behavioral: Negative for confusion.   All other systems reviewed and are negative.      Past Medical History:   Diagnosis Date   • Acute nonintractable headache     nonspcecified headache type    • Acute otitis externa of left ear    • Acute otitis media    • Allergic    • Anxiety    • Arthritis    • Back pain    • Blurry vision    • Contact dermatitis due to poison ivy    • Depression    • Diarrhea    • Dizziness    • Edema    • Factor V Leiden mutation (HCC)     \"snf\"   • Fatigue    • Flatulence    • Fractures, compound     h/o    • " Gastritis    • Gastroenteritis    • GERD (gastroesophageal reflux disease)    • H/O blood clots    • Hair loss    • Hidradenitis    • History of cardiac disorder    • History of kidney infection    • History of seizure disorder    • History of seizures    • Hypertension    • Joint pain, hip    • Low serum vitamin D    • Miscarriage    • Nausea    • Otalgia of left ear    • Pancreatitis    • Rash    • Renal calculi     personal h/o    • Screening breast examination     admits   • Speech complaints    • Vitamin D deficiency        No Known Allergies    Past Surgical History:   Procedure Laterality Date   • CHOLECYSTECTOMY     • HIP SURGERY      multiple since 5th grade x 12 replacements as well    • TOTAL ABDOMINAL HYSTERECTOMY WITH SALPINGO OOPHORECTOMY Bilateral 2005    menometorrhagia   • TUBAL ABDOMINAL LIGATION         Family History   Problem Relation Age of Onset   • Arthritis Mother    • Depression Mother    • Diabetes Mother    • Heart disease Mother    • Hyperlipidemia Mother    • Hypertension Mother    • Stroke Mother    • Arthritis Father    • Depression Father    • Hypertension Father    • Asthma Daughter    • Depression Daughter    • Asthma Son    • Depression Son    • Arthritis Maternal Grandmother    • Cancer Maternal Grandmother    • Depression Maternal Grandmother    • Diabetes Maternal Grandmother    • Depression Maternal Grandfather    • Arthritis Paternal Grandmother    • Depression Paternal Grandmother    • Depression Paternal Grandfather    • Arthritis Other    • Mental illness Other    • Breast cancer Neg Hx    • Ovarian cancer Neg Hx    • Uterine cancer Neg Hx    • Colon cancer Neg Hx        Social History     Socioeconomic History   • Marital status: Single   Tobacco Use   • Smoking status: Current Every Day Smoker     Packs/day: 1.00     Types: Cigarettes   • Smokeless tobacco: Never Used   Vaping Use   • Vaping Use: Never used   Substance and Sexual Activity   • Alcohol use: Not Currently    • Drug use: Yes     Types: Marijuana   • Sexual activity: Not Currently     Birth control/protection: Tubal ligation           Objective   Physical Exam  Vitals and nursing note reviewed.   Constitutional:       Appearance: She is well-developed. She is obese.   HENT:      Head: Atraumatic.      Nose: Nose normal.   Eyes:      General: Lids are normal.      Conjunctiva/sclera: Conjunctivae normal.      Pupils: Pupils are equal, round, and reactive to light.   Cardiovascular:      Rate and Rhythm: Regular rhythm. Tachycardia present.      Heart sounds: Normal heart sounds.   Pulmonary:      Effort: Pulmonary effort is normal.      Breath sounds: Normal breath sounds. No wheezing.   Abdominal:      General: There is no distension.      Palpations: Abdomen is soft.      Tenderness: There is abdominal tenderness in the periumbilical area, left upper quadrant and left lower quadrant. There is no right CVA tenderness, left CVA tenderness, guarding or rebound.   Musculoskeletal:         General: No tenderness. Normal range of motion.      Cervical back: Normal range of motion and neck supple.   Skin:     General: Skin is warm and dry.      Findings: No erythema or rash.   Neurological:      Mental Status: She is alert and oriented to person, place, and time.      Sensory: No sensory deficit.   Psychiatric:         Speech: Speech normal.         Behavior: Behavior normal.         Procedures           ED Course      Re-examined patient and she is feeling better after meds / fluids. Discussed results and tx plan for discharge with close f/u with PCP / GI. Discussed diet / foods to eat / avoid with her Creon. She will return to ED if new / worse sx.     Reviewed old records.       Recent Results (from the past 24 hour(s))   Comprehensive Metabolic Panel    Collection Time: 02/16/22  7:03 PM    Specimen: Blood   Result Value Ref Range    Glucose 106 (H) 65 - 99 mg/dL    BUN 12 6 - 20 mg/dL    Creatinine 1.09 (H) 0.57 - 1.00  mg/dL    Sodium 141 136 - 145 mmol/L    Potassium 4.0 3.5 - 5.2 mmol/L    Chloride 104 98 - 107 mmol/L    CO2 25.0 22.0 - 29.0 mmol/L    Calcium 9.6 8.6 - 10.5 mg/dL    Total Protein 7.0 6.0 - 8.5 g/dL    Albumin 4.40 3.50 - 5.20 g/dL    ALT (SGPT) 39 (H) 1 - 33 U/L    AST (SGOT) 25 1 - 32 U/L    Alkaline Phosphatase 140 (H) 39 - 117 U/L    Total Bilirubin 0.2 0.0 - 1.2 mg/dL    eGFR  African Amer 67 >60 mL/min/1.73    Globulin 2.6 gm/dL    A/G Ratio 1.7 g/dL    BUN/Creatinine Ratio 11.0 7.0 - 25.0    Anion Gap 12.0 5.0 - 15.0 mmol/L   Lipase    Collection Time: 02/16/22  7:03 PM    Specimen: Blood   Result Value Ref Range    Lipase 34 13 - 60 U/L   Green Top (Gel)    Collection Time: 02/16/22  7:03 PM   Result Value Ref Range    Extra Tube Hold for add-ons.    Lavender Top    Collection Time: 02/16/22  7:03 PM   Result Value Ref Range    Extra Tube hold for add-on    Gold Top - SST    Collection Time: 02/16/22  7:03 PM   Result Value Ref Range    Extra Tube Hold for add-ons.    Light Blue Top    Collection Time: 02/16/22  7:03 PM   Result Value Ref Range    Extra Tube hold for add-on    CBC Auto Differential    Collection Time: 02/16/22  7:03 PM    Specimen: Blood   Result Value Ref Range    WBC 7.09 3.40 - 10.80 10*3/mm3    RBC 4.92 3.77 - 5.28 10*6/mm3    Hemoglobin 12.8 12.0 - 15.9 g/dL    Hematocrit 38.6 34.0 - 46.6 %    MCV 78.5 (L) 79.0 - 97.0 fL    MCH 26.0 (L) 26.6 - 33.0 pg    MCHC 33.2 31.5 - 35.7 g/dL    RDW 14.2 12.3 - 15.4 %    RDW-SD 39.9 37.0 - 54.0 fl    MPV 9.3 6.0 - 12.0 fL    Platelets 360 140 - 450 10*3/mm3   Manual Differential    Collection Time: 02/16/22  7:03 PM    Specimen: Blood   Result Value Ref Range    Neutrophil % 39.0 (L) 42.7 - 76.0 %    Lymphocyte % 55.0 (H) 19.6 - 45.3 %    Monocyte % 5.0 5.0 - 12.0 %    Eosinophil % 1.0 0.3 - 6.2 %    Basophil % 0.0 0.0 - 1.5 %    Neutrophils Absolute 2.77 1.70 - 7.00 10*3/mm3    Lymphocytes Absolute 3.90 (H) 0.70 - 3.10 10*3/mm3    Monocytes  "Absolute 0.35 0.10 - 0.90 10*3/mm3    Eosinophils Absolute 0.07 0.00 - 0.40 10*3/mm3    Basophils Absolute 0.00 0.00 - 0.20 10*3/mm3    Anisocytosis Slight/1+ None Seen    Hypochromia Slight/1+ None Seen    WBC Morphology Normal Normal    Platelet Morphology Normal Normal   Urinalysis With Microscopic If Indicated (No Culture) - Urine, Clean Catch    Collection Time: 02/16/22  8:57 PM    Specimen: Urine, Clean Catch   Result Value Ref Range    Color, UA Yellow Yellow, Straw    Appearance, UA Clear Clear    pH, UA 5.5 5.0 - 8.0    Specific Gravity, UA >=1.030 1.001 - 1.030    Glucose, UA Negative Negative    Ketones, UA Negative Negative    Bilirubin, UA Negative Negative    Blood, UA Negative Negative    Protein, UA Negative Negative    Leuk Esterase, UA Negative Negative    Nitrite, UA Negative Negative    Urobilinogen, UA 1.0 E.U./dL 0.2 - 1.0 E.U./dL     Note: In addition to lab results from this visit, the labs listed above may include labs taken at another facility or during a different encounter within the last 24 hours. Please correlate lab times with ED admission and discharge times for further clarification of the services performed during this visit.    CT Abdomen Pelvis With Contrast   Final Result       1.  No acute process seen within the abdomen or pelvis.   2.  Calcifications within the head of the pancreas suggesting changes of   prior pancreatitis.  No evidence of acute pancreatitis or biliary tract   obstruction.       This report was finalized on 2/16/2022 9:10 PM by Ariel Arzate MD.            Vitals:    02/16/22 1828 02/16/22 2022   BP: 132/100 124/90   BP Location: Right arm    Patient Position: Sitting    Pulse: 103 100   Resp: 16 18   Temp: 97.7 °F (36.5 °C)    TempSrc: Oral    SpO2: 100% 100%   Weight: 93 kg (205 lb)    Height: 167.6 cm (66\")      Medications   Sodium Chloride (PF) 0.9 % 10 mL (has no administration in time range)   Morphine sulfate (PF) injection 4 mg (has no " administration in time range)   prochlorperazine (COMPAZINE) injection 10 mg (has no administration in time range)   sodium chloride 0.9 % bolus 1,000 mL (1,000 mL Intravenous New Bag 2/16/22 2016)   ondansetron (ZOFRAN) injection 4 mg (4 mg Intravenous Given 2/16/22 2018)   iopamidol (ISOVUE-300) 61 % injection 85 mL (85 mL Intravenous Given 2/16/22 2038)     ECG/EMG Results (last 24 hours)     ** No results found for the last 24 hours. **        No orders to display     DISCHARGE    Patient discharged in stable condition.    Reviewed implications of results, diagnosis, meds, responsibility to follow up, warning signs and symptoms of possible worsening, potential complications and reasons to return to ER.    Patient/Family voiced understanding of above instructions.    Discussed plan for discharge, as there is no emergent indication for admission.  Pt/family is agreeable and understands need for follow up and possible repeat testing.  Pt/family is aware that discharge does not mean that nothing is wrong but that it indicates no emergency is currently present that requires admission and they must continue care with follow-up as given below or with a physician of their choice.     FOLLOW-UP  Savage Johnson, PA  [address] [facility] [address] [phone]    Schedule an appointment as soon as possible for a visit       Taiwo Guillen MD  [address] [facility] [address]  [place] [id number] [phone]    Schedule an appointment as soon as possible for a visit       [facility] Emergency Department  [address] [facility] [address] [phone]    If symptoms worsen         Medication List      No changes were made to your prescriptions during this visit.                                                 MDM    Final diagnoses:   Generalized abdominal pain   Diarrhea, unspecified type       ED Disposition  ED Disposition     ED Disposition Condition Comment     Discharge Stable           Savage Johnson PA  [address]  [place]  [facility] [address]  [phone]    Schedule an appointment as soon as possible for a visit       Taiwo Guillen MD  [address] [facility] [address]  [facility] [address]  [phone]    Schedule an appointment as soon as possible for a visit        Emergency Department  [address] [facility] [address] [phone]    If symptoms worsen         Medication List      No changes were made to your prescriptions during this visit.          Yvette Perez PA  02/16/22 8004

## 2022-02-23 ENCOUNTER — OFFICE VISIT (OUTPATIENT)
Dept: FAMILY MEDICINE CLINIC | Facility: CLINIC | Age: 42
End: 2022-02-23

## 2022-02-23 VITALS
WEIGHT: 208.5 LBS | OXYGEN SATURATION: 99 % | TEMPERATURE: 96.6 F | HEIGHT: 66 IN | RESPIRATION RATE: 16 BRPM | SYSTOLIC BLOOD PRESSURE: 112 MMHG | HEART RATE: 92 BPM | DIASTOLIC BLOOD PRESSURE: 76 MMHG | BODY MASS INDEX: 33.51 KG/M2

## 2022-02-23 DIAGNOSIS — M35.3 POLYMYALGIA: ICD-10-CM

## 2022-02-23 DIAGNOSIS — R39.15 URINARY URGENCY: ICD-10-CM

## 2022-02-23 DIAGNOSIS — E78.2 MIXED HYPERLIPIDEMIA: ICD-10-CM

## 2022-02-23 DIAGNOSIS — M79.7 FIBROMYALGIA: ICD-10-CM

## 2022-02-23 DIAGNOSIS — R19.7 DIARRHEA, UNSPECIFIED TYPE: ICD-10-CM

## 2022-02-23 DIAGNOSIS — Z51.81 MEDICATION MONITORING ENCOUNTER: ICD-10-CM

## 2022-02-23 DIAGNOSIS — K29.00 OTHER ACUTE GASTRITIS WITHOUT HEMORRHAGE: ICD-10-CM

## 2022-02-23 DIAGNOSIS — Z13.1 SCREENING FOR DIABETES MELLITUS: ICD-10-CM

## 2022-02-23 DIAGNOSIS — K21.00 GASTROESOPHAGEAL REFLUX DISEASE WITH ESOPHAGITIS WITHOUT HEMORRHAGE: ICD-10-CM

## 2022-02-23 DIAGNOSIS — B37.0 ORAL THRUSH: ICD-10-CM

## 2022-02-23 DIAGNOSIS — E55.9 VITAMIN D DEFICIENCY: ICD-10-CM

## 2022-02-23 DIAGNOSIS — F41.9 ANXIETY: ICD-10-CM

## 2022-02-23 DIAGNOSIS — R10.13 EPIGASTRIC PAIN: ICD-10-CM

## 2022-02-23 DIAGNOSIS — R11.0 NAUSEA: ICD-10-CM

## 2022-02-23 DIAGNOSIS — R19.7 DIARRHEA OF PRESUMED INFECTIOUS ORIGIN: Primary | ICD-10-CM

## 2022-02-23 DIAGNOSIS — K58.0 IRRITABLE BOWEL SYNDROME WITH DIARRHEA: ICD-10-CM

## 2022-02-23 PROCEDURE — 99214 OFFICE O/P EST MOD 30 MIN: CPT | Performed by: PHYSICIAN ASSISTANT

## 2022-02-23 RX ORDER — CLONIDINE HYDROCHLORIDE 0.1 MG/1
0.1 TABLET ORAL 3 TIMES DAILY PRN
Qty: 90 TABLET | Refills: 3 | Status: SHIPPED | OUTPATIENT
Start: 2022-02-23

## 2022-02-23 RX ORDER — DULOXETIN HYDROCHLORIDE 60 MG/1
60 CAPSULE, DELAYED RELEASE ORAL DAILY
Qty: 90 CAPSULE | Refills: 3 | Status: SHIPPED | OUTPATIENT
Start: 2022-02-23 | End: 2022-05-12

## 2022-02-23 RX ORDER — CYCLOBENZAPRINE HCL 10 MG
10 TABLET ORAL 3 TIMES DAILY PRN
Qty: 270 TABLET | Refills: 3 | Status: SHIPPED | OUTPATIENT
Start: 2022-02-23 | End: 2023-03-24

## 2022-02-23 RX ORDER — DICYCLOMINE HCL 20 MG
20 TABLET ORAL 4 TIMES DAILY
Qty: 120 TABLET | Refills: 5 | Status: SHIPPED | OUTPATIENT
Start: 2022-02-23 | End: 2022-08-22

## 2022-02-23 RX ORDER — FLUCONAZOLE 200 MG/1
200 TABLET ORAL DAILY
Qty: 2 TABLET | Refills: 0 | Status: SHIPPED | OUTPATIENT
Start: 2022-02-23 | End: 2022-03-04 | Stop reason: SDUPTHER

## 2022-02-23 RX ORDER — ONDANSETRON 4 MG/1
4 TABLET, FILM COATED ORAL SEE ADMIN INSTRUCTIONS
Qty: 30 TABLET | Refills: 5 | Status: SHIPPED | OUTPATIENT
Start: 2022-02-23

## 2022-02-23 RX ORDER — IMIPRAMINE HCL 25 MG
25 TABLET ORAL DAILY
Qty: 90 TABLET | Refills: 3 | Status: SHIPPED | OUTPATIENT
Start: 2022-02-23 | End: 2022-07-18

## 2022-02-23 RX ORDER — HYDROXYZINE 50 MG/1
50 TABLET, FILM COATED ORAL 3 TIMES DAILY
Qty: 90 TABLET | Refills: 5 | Status: SHIPPED | OUTPATIENT
Start: 2022-02-23 | End: 2022-05-10

## 2022-02-23 RX ORDER — DEXLANSOPRAZOLE 60 MG/1
1 CAPSULE, DELAYED RELEASE ORAL DAILY
Qty: 90 CAPSULE | Refills: 3 | Status: SHIPPED | OUTPATIENT
Start: 2022-02-23 | End: 2023-03-24

## 2022-02-23 RX ORDER — PROMETHAZINE HYDROCHLORIDE 25 MG/1
25 TABLET ORAL EVERY 6 HOURS PRN
Qty: 30 TABLET | Refills: 5 | Status: SHIPPED | OUTPATIENT
Start: 2022-02-23 | End: 2022-04-27 | Stop reason: SDUPTHER

## 2022-02-23 RX ORDER — ROSUVASTATIN CALCIUM 5 MG/1
5 TABLET, COATED ORAL DAILY
Qty: 90 TABLET | Refills: 3 | Status: SHIPPED | OUTPATIENT
Start: 2022-02-23 | End: 2022-05-12

## 2022-02-23 NOTE — PROGRESS NOTES
"Subjective   Nia Bah is a 42 y.o. female.     History of Present Illness   Transferring care from TEJA Rabago     Presenting requesting medication refills    Notes a lot of recurring stomach issues with pain, diarrhea and reflux. Currently on dexilant therapy   Recent ER visit on 2/16 at [facility] for generalized abdominal pain. CT revealed previous pancreatitis but nothing acute   No recent GI follow up   Frequently feels nauseated     Has been dealing with oral thrush that has been tender and makes it hard to swallow   Some urinary urgency. States urine was not checked at the hospital, but UA studies reviewed and normal at that time     Has diagnosis of fibromyalgia. Taking Cymbalta and muscle relaxer  Taking statin for high cholesterol   Has also been diagnosed with anxiety     The following portions of the patient's history were reviewed and updated as appropriate: allergies, current medications, past family history, past medical history, past social history, past surgical history and problem list.    Review of Systems   Constitutional: Positive for fatigue. Negative for chills and fever.   Respiratory: Negative for cough, shortness of breath and wheezing.    Cardiovascular: Negative for chest pain.   Gastrointestinal: Positive for abdominal pain, diarrhea and nausea.   Skin: Negative for rash.       Objective    Blood pressure 112/76, pulse 92, temperature 96.6 °F (35.9 °C), resp. rate 16, height 167.6 cm (66\"), weight 94.6 kg (208 lb 8 oz), SpO2 99 %, not currently breastfeeding.     Physical Exam  Vitals and nursing note reviewed.   Constitutional:       Appearance: Normal appearance.   HENT:      Head: Normocephalic.      Right Ear: External ear normal.      Left Ear: External ear normal.      Nose: Nose normal.   Eyes:      Conjunctiva/sclera: Conjunctivae normal.   Cardiovascular:      Rate and Rhythm: Normal rate and regular rhythm.   Pulmonary:      Effort: Pulmonary effort is normal. " No respiratory distress.      Breath sounds: Normal breath sounds.   Abdominal:      Tenderness: There is abdominal tenderness in the epigastric area.   Neurological:      General: No focal deficit present.      Mental Status: She is alert and oriented to person, place, and time.   Psychiatric:         Behavior: Behavior normal.         Thought Content: Thought content normal.         Judgment: Judgment normal.         Assessment/Plan   Diagnoses and all orders for this visit:    1. Diarrhea of presumed infectious origin (Primary)  -     Gastrointestinal Panel, PCR - Stool, Per Rectum  -     CBC w AUTO Differential  -     Comprehensive Metabolic Panel    2. Polymyalgia (HCC)  -     DULoxetine (Cymbalta) 60 MG capsule; Take 1 capsule by mouth Daily.  Dispense: 90 capsule; Refill: 3    3. Mixed hyperlipidemia  -     rosuvastatin (Crestor) 5 MG tablet; Take 1 tablet by mouth Daily.  Dispense: 90 tablet; Refill: 3    4. Nausea  -     promethazine (PHENERGAN) 25 MG tablet; Take 1 tablet by mouth Every 6 (Six) Hours As Needed for Nausea or Vomiting.  Dispense: 30 tablet; Refill: 5  -     CBC w AUTO Differential  -     Comprehensive Metabolic Panel  -     H. Pylori Breath Test - Breath, Lung    5. Anxiety  -     hydrOXYzine (ATARAX) 50 MG tablet; Take 1 tablet by mouth 3 (Three) Times a Day.  Dispense: 90 tablet; Refill: 5  -     imipramine (TOFRANIL) 25 MG tablet; Take 1 tablet by mouth Daily.  Dispense: 90 tablet; Refill: 3  -     TSH  -     T4, free  -     Iron Profile  -     Vitamin B12    6. Irritable bowel syndrome with diarrhea  -     dicyclomine (BENTYL) 20 MG tablet; Take 1 tablet by mouth 4 (Four) Times a Day.  Dispense: 120 tablet; Refill: 5  -     CBC w AUTO Differential  -     Comprehensive Metabolic Panel    7. Other acute gastritis without hemorrhage  -     dexlansoprazole (Dexilant) 60 MG capsule; Take 1 capsule by mouth Daily.  Dispense: 90 capsule; Refill: 3    8. Gastroesophageal reflux disease with  esophagitis without hemorrhage  -     dexlansoprazole (Dexilant) 60 MG capsule; Take 1 capsule by mouth Daily.  Dispense: 90 capsule; Refill: 3  -     CBC w AUTO Differential    9. Fibromyalgia  -     cyclobenzaprine (FLEXERIL) 10 MG tablet; Take 1 tablet by mouth 3 (Three) Times a Day As Needed for Muscle Spasms.  Dispense: 270 tablet; Refill: 3  -     CBC w AUTO Differential  -     Comprehensive Metabolic Panel    10. Vitamin D deficiency  -     Vitamin D 25 hydroxy    11. Epigastric pain  -     Lipase  -     H. Pylori Breath Test - Breath, Lung    12. Screening for diabetes mellitus  -     Hemoglobin A1c    13. Medication monitoring encounter  -     CBC w AUTO Differential  -     Comprehensive Metabolic Panel  -     Magnesium    14. Urinary urgency  -     POCT urinalysis dipstick, automated    15. Oral thrush  -     fluconazole (Diflucan) 200 MG tablet; Take 1 tablet by mouth Daily. Repeat in 3 days.  Dispense: 2 tablet; Refill: 0    Other orders  -     ondansetron (ZOFRAN) 4 MG tablet; Take 1 tablet by mouth See Admin Instructions. Take 1 tablet by mouth every 6-8 hours as needed  Dispense: 30 tablet; Refill: 5  -     cloNIDine (CATAPRES) 0.1 MG tablet; Take 1 tablet by mouth 3 (Three) Times a Day As Needed for High Blood Pressure.  Dispense: 90 tablet; Refill: 3      Labs and refills as outlined in plan   Will send urine for culture to rule out infection   Diflucan for oral thrush since mouth was was not effective.   Report to ER if new or worsening symptoms develop

## 2022-02-24 DIAGNOSIS — R30.0 DYSURIA: Primary | ICD-10-CM

## 2022-02-24 LAB
25(OH)D3+25(OH)D2 SERPL-MCNC: 31.6 NG/ML (ref 30–100)
ALBUMIN SERPL-MCNC: 4.5 G/DL (ref 3.8–4.8)
ALBUMIN/GLOB SERPL: 1.7 {RATIO} (ref 1.2–2.2)
ALP SERPL-CCNC: 134 IU/L (ref 44–121)
ALT SERPL-CCNC: 34 IU/L (ref 0–32)
AST SERPL-CCNC: 18 IU/L (ref 0–40)
BASOPHILS # BLD AUTO: 0 X10E3/UL (ref 0–0.2)
BASOPHILS NFR BLD AUTO: 0 %
BILIRUB SERPL-MCNC: <0.2 MG/DL (ref 0–1.2)
BUN SERPL-MCNC: 13 MG/DL (ref 6–24)
BUN/CREAT SERPL: 12 (ref 9–23)
CALCIUM SERPL-MCNC: 9.8 MG/DL (ref 8.7–10.2)
CHLORIDE SERPL-SCNC: 101 MMOL/L (ref 96–106)
CO2 SERPL-SCNC: 24 MMOL/L (ref 20–29)
CREAT SERPL-MCNC: 1.08 MG/DL (ref 0.57–1)
EOSINOPHIL # BLD AUTO: 0.2 X10E3/UL (ref 0–0.4)
EOSINOPHIL NFR BLD AUTO: 2 %
ERYTHROCYTE [DISTWIDTH] IN BLOOD BY AUTOMATED COUNT: 13.6 % (ref 11.7–15.4)
GLOBULIN SER CALC-MCNC: 2.6 G/DL (ref 1.5–4.5)
GLUCOSE SERPL-MCNC: 89 MG/DL (ref 65–99)
HBA1C MFR BLD: 6 % (ref 4.8–5.6)
HCT VFR BLD AUTO: 40 % (ref 34–46.6)
HGB BLD-MCNC: 13.2 G/DL (ref 11.1–15.9)
IMM GRANULOCYTES # BLD AUTO: 0 X10E3/UL (ref 0–0.1)
IMM GRANULOCYTES NFR BLD AUTO: 0 %
IRON SATN MFR SERPL: 22 % (ref 15–55)
IRON SERPL-MCNC: 72 UG/DL (ref 27–159)
LIPASE SERPL-CCNC: 24 U/L (ref 14–72)
LYMPHOCYTES # BLD AUTO: 3.4 X10E3/UL (ref 0.7–3.1)
LYMPHOCYTES NFR BLD AUTO: 49 %
MAGNESIUM SERPL-MCNC: 2.1 MG/DL (ref 1.6–2.3)
MCH RBC QN AUTO: 25.8 PG (ref 26.6–33)
MCHC RBC AUTO-ENTMCNC: 33 G/DL (ref 31.5–35.7)
MCV RBC AUTO: 78 FL (ref 79–97)
MONOCYTES # BLD AUTO: 0.6 X10E3/UL (ref 0.1–0.9)
MONOCYTES NFR BLD AUTO: 9 %
NEUTROPHILS # BLD AUTO: 2.8 X10E3/UL (ref 1.4–7)
NEUTROPHILS NFR BLD AUTO: 40 %
PLATELET # BLD AUTO: 382 X10E3/UL (ref 150–450)
POTASSIUM SERPL-SCNC: 4.7 MMOL/L (ref 3.5–5.2)
PROT SERPL-MCNC: 7.1 G/DL (ref 6–8.5)
RBC # BLD AUTO: 5.12 X10E6/UL (ref 3.77–5.28)
SODIUM SERPL-SCNC: 140 MMOL/L (ref 134–144)
T4 FREE SERPL-MCNC: 0.99 NG/DL (ref 0.82–1.77)
TIBC SERPL-MCNC: 328 UG/DL (ref 250–450)
TSH SERPL DL<=0.005 MIU/L-ACNC: 2.72 UIU/ML (ref 0.45–4.5)
UIBC SERPL-MCNC: 256 UG/DL (ref 131–425)
UREA BREATH TEST QL: NEGATIVE
VIT B12 SERPL-MCNC: 764 PG/ML (ref 232–1245)
WBC # BLD AUTO: 7 X10E3/UL (ref 3.4–10.8)

## 2022-02-24 RX ORDER — DIPHENOXYLATE HYDROCHLORIDE AND ATROPINE SULFATE 2.5; .025 MG/1; MG/1
1 TABLET ORAL 4 TIMES DAILY PRN
Qty: 20 TABLET | Refills: 0 | Status: SHIPPED | OUTPATIENT
Start: 2022-02-24 | End: 2022-06-24

## 2022-02-27 LAB
BACTERIA UR CULT: ABNORMAL
BACTERIA UR CULT: ABNORMAL
OTHER ANTIBIOTIC SUSC ISLT: ABNORMAL

## 2022-02-28 DIAGNOSIS — N39.0 ACUTE UTI: Primary | ICD-10-CM

## 2022-02-28 RX ORDER — NITROFURANTOIN 25; 75 MG/1; MG/1
100 CAPSULE ORAL 2 TIMES DAILY
Qty: 14 CAPSULE | Refills: 0 | Status: SHIPPED | OUTPATIENT
Start: 2022-02-28 | End: 2022-03-04 | Stop reason: ALTCHOICE

## 2022-03-04 ENCOUNTER — OFFICE VISIT (OUTPATIENT)
Dept: FAMILY MEDICINE CLINIC | Facility: CLINIC | Age: 42
End: 2022-03-04

## 2022-03-04 VITALS
BODY MASS INDEX: 33.56 KG/M2 | HEART RATE: 103 BPM | SYSTOLIC BLOOD PRESSURE: 112 MMHG | HEIGHT: 66 IN | WEIGHT: 208.8 LBS | RESPIRATION RATE: 18 BRPM | DIASTOLIC BLOOD PRESSURE: 80 MMHG | TEMPERATURE: 97.1 F | OXYGEN SATURATION: 100 %

## 2022-03-04 DIAGNOSIS — B37.0 ORAL THRUSH: ICD-10-CM

## 2022-03-04 DIAGNOSIS — B37.0 THRUSH: ICD-10-CM

## 2022-03-04 DIAGNOSIS — N39.0 E. COLI UTI: ICD-10-CM

## 2022-03-04 DIAGNOSIS — K86.1 IDIOPATHIC CHRONIC PANCREATITIS: ICD-10-CM

## 2022-03-04 DIAGNOSIS — B96.20 E. COLI UTI: ICD-10-CM

## 2022-03-04 DIAGNOSIS — M79.7 FIBROMYALGIA: ICD-10-CM

## 2022-03-04 DIAGNOSIS — H66.002 NON-RECURRENT ACUTE SUPPURATIVE OTITIS MEDIA OF LEFT EAR WITHOUT SPONTANEOUS RUPTURE OF TYMPANIC MEMBRANE: Primary | ICD-10-CM

## 2022-03-04 PROCEDURE — 99214 OFFICE O/P EST MOD 30 MIN: CPT | Performed by: FAMILY MEDICINE

## 2022-03-04 RX ORDER — FLUCONAZOLE 200 MG/1
200 TABLET ORAL DAILY
Qty: 2 TABLET | Refills: 0 | Status: SHIPPED | OUTPATIENT
Start: 2022-03-04 | End: 2022-05-12

## 2022-03-04 RX ORDER — AMOXICILLIN AND CLAVULANATE POTASSIUM 875; 125 MG/1; MG/1
1 TABLET, FILM COATED ORAL 2 TIMES DAILY
Qty: 20 TABLET | Refills: 0 | Status: SHIPPED | OUTPATIENT
Start: 2022-03-04 | End: 2022-03-14

## 2022-03-04 RX ORDER — IBUPROFEN 800 MG/1
800 TABLET ORAL EVERY 8 HOURS PRN
Qty: 90 TABLET | Refills: 0 | Status: SHIPPED | OUTPATIENT
Start: 2022-03-04 | End: 2022-04-27 | Stop reason: SDUPTHER

## 2022-03-04 NOTE — PROGRESS NOTES
"Chief Complaint  Diarrhea (started yesterday), Generalized Body Aches, Vomiting, and Nausea    Subjective          Nia Bah presents to [facility]  GI Problem  The primary symptoms include nausea, vomiting and diarrhea. Primary symptoms do not include fever. Primary symptoms comment: muscle spasms. The illness began yesterday. The onset was sudden.   Nausea began yesterday. The nausea is exacerbated by food.   The diarrhea began yesterday. The diarrhea is watery.   Other family members have been ill with similar symptoms  She has been treating with Zofran and phenergan. Worried about going to work with current symptoms and spreading illness.     Prior to onset of symptoms yesterday, she has been dealing with GI symptoms  She is scheduled with gastroenterology in the upcoming month    She has thrush  Treated with diflucan recently, but no improvement    Having left ear pain  Worried about infection    The following portions of the patient's history were reviewed and updated as appropriate: allergies, current medications, past family history, past medical history, past social history, past surgical history and problem list.    Objective   Vital Signs:   /80   Pulse 103   Temp 97.1 °F (36.2 °C)   Resp 18   Ht 167.6 cm (65.98\")   Wt 94.7 kg (208 lb 12.8 oz)   SpO2 100%   BMI 33.72 kg/m²     Physical Exam  Vitals and nursing note reviewed.   Constitutional:       Appearance: Normal appearance. She is well-developed.   HENT:      Head: Normocephalic and atraumatic.      Right Ear: External ear normal.      Left Ear: External ear normal. A middle ear effusion is present. Tympanic membrane is scarred and erythematous.   Eyes:      Conjunctiva/sclera: Conjunctivae normal.   Cardiovascular:      Rate and Rhythm: Normal rate and regular rhythm.      Heart sounds: Normal heart sounds. No murmur heard.      Pulmonary:      Effort: Pulmonary effort is normal.      Breath " sounds: Normal breath sounds. No wheezing.   Abdominal:      Palpations: Abdomen is soft.   Musculoskeletal:         General: No deformity.      Cervical back: Neck supple.   Skin:     General: Skin is warm.   Neurological:      Mental Status: She is alert and oriented to person, place, and time.   Psychiatric:         Behavior: Behavior normal.        Result Review :                 Assessment and Plan    Diagnoses and all orders for this visit:    1. Non-recurrent acute suppurative otitis media of left ear without spontaneous rupture of tympanic membrane (Primary)  Comments:  Augmentin to treat  Orders:  -     amoxicillin-clavulanate (Augmentin) 875-125 MG per tablet; Take 1 tablet by mouth 2 (Two) Times a Day for 10 days.  Dispense: 20 tablet; Refill: 0    2. E. coli UTI  Comments:  Urine culture reviewed. Will change to Augmentin to also cover OM left ear  Orders:  -     amoxicillin-clavulanate (Augmentin) 875-125 MG per tablet; Take 1 tablet by mouth 2 (Two) Times a Day for 10 days.  Dispense: 20 tablet; Refill: 0    3. Idiopathic chronic pancreatitis (HCC)  Comments:  Keep appt with GI  Orders:  -     ibuprofen (ADVIL,MOTRIN) 800 MG tablet; Take 1 tablet by mouth Every 8 (Eight) Hours As Needed for Mild Pain .  Dispense: 90 tablet; Refill: 0    4. Thrush  -     nystatin (MYCOSTATIN) 100,000 unit/mL suspension; Swish and swallow 5 mL 4 (Four) Times a Day.  Dispense: 473 mL; Refill: 0    5. Fibromyalgia  -     ibuprofen (ADVIL,MOTRIN) 800 MG tablet; Take 1 tablet by mouth Every 8 (Eight) Hours As Needed for Mild Pain .  Dispense: 90 tablet; Refill: 0    6. Oral thrush  -     fluconazole (Diflucan) 200 MG tablet; Take 1 tablet by mouth Daily. Repeat in 3 days.  Dispense: 2 tablet; Refill: 0        Follow Up   Return if symptoms worsen or fail to improve.  Patient was given instructions and counseling regarding her condition or for health maintenance advice. Please see specific information pulled into the AVS if  appropriate.

## 2022-03-16 ENCOUNTER — TELEPHONE (OUTPATIENT)
Dept: FAMILY MEDICINE CLINIC | Facility: CLINIC | Age: 42
End: 2022-03-16

## 2022-03-16 DIAGNOSIS — N39.0 ACUTE UTI: Primary | ICD-10-CM

## 2022-03-16 NOTE — TELEPHONE ENCOUNTER
Caller: Nia Bah     Relationship: SELF    Best call back number: [phone]    What is your medical concern? THINKS SHE STILL HAS A UTI    How long has this issue been going on? MAYBE 3 WEEKS    Is your provider already aware of this issue? YES    Have you been treated for this issue? YES, SHE HAS FINISHED HER ROUND OF ANTIBIOTICS BUT STILL FEELS LIKE SHE HAS UTI. PLEASE ADVISE

## 2022-03-17 ENCOUNTER — TELEPHONE (OUTPATIENT)
Dept: FAMILY MEDICINE CLINIC | Facility: CLINIC | Age: 42
End: 2022-03-17

## 2022-03-17 NOTE — TELEPHONE ENCOUNTER
Caller: Nia Bah    Relationship: Self    Best call back number: [phone]    Caller requesting test results: YES    What test was performed: URIN     When was the test performed: 03/17/22    Where was the test performed: IN OFFICE     Additional notes: PATIENT WOULD LIKE A CALL BACK TODAY

## 2022-03-19 LAB
BACTERIA UR CULT: NO GROWTH
BACTERIA UR CULT: NORMAL

## 2022-03-21 DIAGNOSIS — R39.198 DIFFICULTY URINATING: Primary | ICD-10-CM

## 2022-03-22 ENCOUNTER — OFFICE VISIT (OUTPATIENT)
Dept: UROLOGY | Facility: CLINIC | Age: 42
End: 2022-03-22

## 2022-03-22 VITALS — WEIGHT: 208 LBS | HEIGHT: 66 IN | BODY MASS INDEX: 33.43 KG/M2 | HEART RATE: 104 BPM | OXYGEN SATURATION: 97 %

## 2022-03-22 DIAGNOSIS — R39.9 LOWER URINARY TRACT SYMPTOMS (LUTS): Primary | ICD-10-CM

## 2022-03-22 LAB
BILIRUB BLD-MCNC: NEGATIVE MG/DL
CLARITY, POC: CLEAR
COLOR UR: YELLOW
EXPIRATION DATE: NORMAL
GLUCOSE UR STRIP-MCNC: NEGATIVE MG/DL
KETONES UR QL: NEGATIVE
LEUKOCYTE EST, POC: NEGATIVE
Lab: NORMAL
NITRITE UR-MCNC: NEGATIVE MG/ML
PH UR: 6 [PH] (ref 5–8)
PROT UR STRIP-MCNC: NEGATIVE MG/DL
RBC # UR STRIP: NEGATIVE /UL
SP GR UR: 1.02 (ref 1–1.03)
UROBILINOGEN UR QL: NORMAL

## 2022-03-22 PROCEDURE — 99204 OFFICE O/P NEW MOD 45 MIN: CPT | Performed by: UROLOGY

## 2022-03-22 RX ORDER — FLUCONAZOLE 150 MG/1
TABLET ORAL
COMMUNITY
Start: 2022-02-28 | End: 2022-05-12

## 2022-03-22 NOTE — PROGRESS NOTES
Office Note Kidney Stone      Patient Name: Nia Bah  : 1980   MRN: [id number]     Chief Complaint: History of Nephrolithiasis/Ureterolithiasis     Referring Provider: No ref. provider found    History of Present Illness: Nia Bah is a 42 y.o. female who presents today for history of ***    Patient has previously tried stone prevention medications: ***    Stone related history  Family history of stones:   {YES (DEF) /NO:}  Renal disease or anatomic abnormality: {YES (DEF) /NO:}  Malabsorptive disease or gastric bypass: {YES (DEF) /NO:}  Frequent UTI's    {YES (DEF) /NO:}  Parathyroid disease    {YES (DEF) /NO:}        Subjective      Review of System: Review of Systems   Constitutional: Negative for chills, fatigue, fever and unexpected weight change.   HENT: Negative for sore throat.    Eyes: Negative for visual disturbance.   Respiratory: Negative for cough, chest tightness and shortness of breath.    Cardiovascular: Negative for chest pain and leg swelling.   Gastrointestinal: Positive for abdominal pain, nausea and vomiting. Negative for blood in stool, constipation, diarrhea and rectal pain.   Genitourinary: Positive for dysuria, frequency and urgency. Negative for decreased urine volume, difficulty urinating, enuresis, flank pain, genital sores and hematuria.   Musculoskeletal: Positive for back pain. Negative for joint swelling.   Skin: Negative for rash and wound.   Neurological: Negative for seizures, speech difficulty, weakness and headaches.   Psychiatric/Behavioral: Negative for confusion, sleep disturbance and suicidal ideas. The patient is not nervous/anxious.         I have reviewed the ROS documented by my clinical staff, updated as appropriate and I agree. Sandy Montiel MA    Past Medical History:   Past Medical History:   Diagnosis Date   • Acute nonintractable headache     nonspcecified headache type    • Acute otitis externa of left  "ear    • Acute otitis media    • Allergic    • Anxiety    • Arthritis    • Back pain    • Blurry vision    • Contact dermatitis due to poison ivy    • Depression    • Diarrhea    • Dizziness    • Edema    • Factor V Leiden mutation (HCC)     \"MCC\"   • Fatigue    • Flatulence    • Fractures, compound     h/o    • Gastritis    • Gastroenteritis    • GERD (gastroesophageal reflux disease)    • H/O blood clots    • Hair loss    • Hidradenitis    • History of cardiac disorder    • History of kidney infection    • History of seizure disorder    • History of seizures    • Hypertension    • Joint pain, hip    • Low serum vitamin D    • Miscarriage    • Nausea    • Otalgia of left ear    • Pancreatitis    • Rash    • Renal calculi     personal h/o    • Screening breast examination     admits   • Speech complaints    • Vitamin D deficiency        Past Surgical History:   Past Surgical History:   Procedure Laterality Date   • CHOLECYSTECTOMY     • HIP SURGERY      multiple since 5th grade x 12 replacements as well    • TOTAL ABDOMINAL HYSTERECTOMY WITH SALPINGO OOPHORECTOMY Bilateral 2005    menometorrhagia   • TUBAL ABDOMINAL LIGATION         Family History:   Family History   Problem Relation Age of Onset   • Arthritis Mother    • Depression Mother    • Diabetes Mother    • Heart disease Mother    • Hyperlipidemia Mother    • Hypertension Mother    • Stroke Mother    • Arthritis Father    • Depression Father    • Hypertension Father    • Asthma Daughter    • Depression Daughter    • Asthma Son    • Depression Son    • Arthritis Maternal Grandmother    • Cancer Maternal Grandmother    • Depression Maternal Grandmother    • Diabetes Maternal Grandmother    • Depression Maternal Grandfather    • Arthritis Paternal Grandmother    • Depression Paternal Grandmother    • Depression Paternal Grandfather    • Arthritis Other    • Mental illness Other    • Breast cancer Neg Hx    • Ovarian cancer Neg Hx    • Uterine cancer Neg Hx  "   • Colon cancer Neg Hx        Social History:   Social History     Socioeconomic History   • Marital status: Single   Tobacco Use   • Smoking status: Current Every Day Smoker     Packs/day: 1.00     Types: Cigarettes   • Smokeless tobacco: Never Used   Vaping Use   • Vaping Use: Never used   Substance and Sexual Activity   • Alcohol use: Not Currently   • Drug use: Yes     Types: Marijuana   • Sexual activity: Not Currently     Birth control/protection: Tubal ligation       Medications:     Current Outpatient Medications:   •  albuterol sulfate  (90 Base) MCG/ACT inhaler, Inhale 2 puffs Every 4 (Four) Hours As Needed for Wheezing or Shortness of Air., Disp: 18 g, Rfl: 0  •  amLODIPine (NORVASC) 5 MG tablet, Take 1 tablet by mouth Daily., Disp: 90 tablet, Rfl: 3  •  B-12 Methylcobalamin 1000 MCG tablet dispersible, Place 1,000 mg on the tongue Daily., Disp: 90 tablet, Rfl: 3  •  Banophen 50 MG capsule, , Disp: , Rfl:   •  clindamycin (CLEOCIN T) 1 % lotion, Apply  topically to the appropriate area as directed 2 (Two) Times a Day., Disp: 60 mL, Rfl: 5  •  cloNIDine (CATAPRES) 0.1 MG tablet, Take 1 tablet by mouth 3 (Three) Times a Day As Needed for High Blood Pressure., Disp: 90 tablet, Rfl: 3  •  Coenzyme Q10 (CoQ10) 400 MG capsule, Take 400 mg by mouth Daily., Disp: 90 capsule, Rfl: 4  •  Creon 41460 units capsule delayed-release particles capsule, TAKE 1 CAPSULE BY MOUTH TWICE A DAY WITH MEALS, Disp: 180 capsule, Rfl: 3  •  cyclobenzaprine (FLEXERIL) 10 MG tablet, Take 1 tablet by mouth 3 (Three) Times a Day As Needed for Muscle Spasms., Disp: 270 tablet, Rfl: 3  •  dexlansoprazole (Dexilant) 60 MG capsule, Take 1 capsule by mouth Daily., Disp: 90 capsule, Rfl: 3  •  dicyclomine (BENTYL) 20 MG tablet, Take 1 tablet by mouth 4 (Four) Times a Day., Disp: 120 tablet, Rfl: 5  •  diphenoxylate-atropine (Lomotil) 2.5-0.025 MG per tablet, Take 1 tablet by mouth 4 (Four) Times a Day As Needed for Diarrhea., Disp:  20 tablet, Rfl: 0  •  DULoxetine (Cymbalta) 60 MG capsule, Take 1 capsule by mouth Daily., Disp: 90 capsule, Rfl: 3  •  ergocalciferol (ERGOCALCIFEROL) 1.25 MG (09847 UT) capsule, Take 1 capsule by mouth 1 (One) Time Per Week., Disp: 5 capsule, Rfl: 11  •  fluconazole (Diflucan) 200 MG tablet, Take 1 tablet by mouth Daily. Repeat in 3 days., Disp: 2 tablet, Rfl: 0  •  hydrOXYzine (ATARAX) 50 MG tablet, Take 1 tablet by mouth 3 (Three) Times a Day., Disp: 90 tablet, Rfl: 5  •  ibuprofen (ADVIL,MOTRIN) 800 MG tablet, Take 1 tablet by mouth Every 8 (Eight) Hours As Needed for Mild Pain ., Disp: 90 tablet, Rfl: 0  •  imipramine (TOFRANIL) 25 MG tablet, Take 1 tablet by mouth Daily., Disp: 90 tablet, Rfl: 3  •  levocetirizine (XYZAL) 5 MG tablet, Take 5 mg by mouth Every Evening., Disp: , Rfl:   •  Multiple Vitamin (THERA-TABS) tablet, Take 1 tablet by mouth Every Morning., Disp: , Rfl: 0  •  mupirocin (BACTROBAN) 2 % ointment, Apply 1 application topically to the appropriate area as directed 3 (Three) Times a Day., Disp: 22 g, Rfl: 0  •  nystatin (MYCOSTATIN) 100,000 unit/mL suspension, Swish and swallow 5 mL 4 (Four) Times a Day., Disp: 473 mL, Rfl: 0  •  ondansetron (ZOFRAN) 4 MG tablet, Take 1 tablet by mouth See Admin Instructions. Take 1 tablet by mouth every 6-8 hours as needed, Disp: 30 tablet, Rfl: 5  •  PREMARIN 0.625 MG/GM vaginal cream, , Disp: , Rfl:   •  promethazine (PHENERGAN) 25 MG tablet, Take 1 tablet by mouth Every 6 (Six) Hours As Needed for Nausea or Vomiting., Disp: 30 tablet, Rfl: 5  •  rosuvastatin (Crestor) 5 MG tablet, Take 1 tablet by mouth Daily., Disp: 90 tablet, Rfl: 3  •  terconazole (Terazol 7) 0.4 % vaginal cream, Insert 1 applicator into the vagina Every Night., Disp: 1 each, Rfl: 1    Allergies:   No Known Allergies    Objective     Physical Exam:   Vital Signs: There were no vitals filed for this visit.  There is no height or weight on file to calculate BMI.     Physical Exam    Labs:  "  Brief Urine Lab Results  (Last result in the past 365 days)      Color   Clarity   Blood   Leuk Est   Nitrite   Protein   CREAT   Urine HCG        02/16/22 2057 Yellow   Clear   Negative   Negative   Negative   Negative                 Urine Culture    Urine Culture 4/12/21 2/24/22 3/17/22   Urine Culture Final report Final report (A) Final report   (A) Abnormal value               Lab Results   Component Value Date    GLUCOSE 89 02/23/2022    CALCIUM 9.8 02/23/2022     02/23/2022    K 4.7 02/23/2022    CO2 24 02/23/2022     02/23/2022    BUN 13 02/23/2022    CREATININE 1.08 (H) 02/23/2022    EGFRIFAFRI 73 02/23/2022    EGFRIFNONA 63 02/23/2022    BCR 12 02/23/2022    ANIONGAP 12.0 02/16/2022       Lab Results   Component Value Date    WBC 7.0 02/23/2022    HGB 13.2 02/23/2022    HCT 40.0 02/23/2022    MCV 78 (L) 02/23/2022     02/23/2022         Images:   CT Abdomen Pelvis With Contrast    Result Date: 2/16/2022   1.  No acute process seen within the abdomen or pelvis. 2.  Calcifications within the head of the pancreas suggesting changes of prior pancreatitis.  No evidence of acute pancreatitis or biliary tract obstruction.  This report was finalized on 2/16/2022 9:10 PM by Ariel Arzate MD.        Measures:   Tobacco:   Nia Bah  reports that she has been smoking cigarettes. She has been smoking about 1.00 pack per day. She has never used smokeless tobacco.. I have educated her on the risk of diseases from using tobacco products such as {Tobacco Cessation Diseases:61011::\"cancer\",\"COPD\",\"heart disease\"}.     I advised her to quit and she is {Willing/Not Willing to Quit Tobacco Products:64882}.    I spent {Time Spent Tobacco :30473} minutes counseling the patient.           Urine Incontinence: ( NOUI)  ***     Assessment / Plan      Assessment/Plan:   Nia Bah is a 42 y.o. female who presented today with nephrolithiasis/ureterolithiasis ***     There are no diagnoses " linked to this encounter.         Patient Education:         Follow Up:   No follow-ups on file.    I spent approximately *** minutes providing clinical care for this patient; including review of patient's chart and provider documentation, face to face time spent with patient in examination room (obtaining history, performing physical exam, discussing diagnosis and management options), placing orders, and completing patient documentation.     Vignesh Slaughter MD  [facility]

## 2022-03-23 PROBLEM — R39.9 LOWER URINARY TRACT SYMPTOMS (LUTS): Status: ACTIVE | Noted: 2022-03-23

## 2022-03-23 NOTE — PROGRESS NOTES
LUTS Female Office Visit      Patient Name: Nia Bah  : 1980   MRN: [id number]     Chief Complaint:  Lower Urinary Tract Symptoms.     Referring Provider: Pam Wan PA     History of Present Illness: Mrs. Bah is a 42 y.o. female with history of lower urinary tract symptoms.  Past medical history includes factor V Leiden, GERD, irritable bowel syndrome, vaginal dysplasia, depression, anxiety, polyarthralgia, chronic pain syndrome, seizure disorder, headache, memory loss, hidradenitis, fatigue, tobacco abuse.. She presents today for evaluation of lower urinary tract symptoms.  The patient had a positive urine culture with greater than 100,000 E. coli in .  She had continued urinary symptoms following treatment with p.o. course of antibiotics, repeat urinary culture 3/17/2022 was no growth.  She had CT imaging at the time of positive culture demonstrating no evidence of nephrolithiasis, concerning intra-abdominal finding.  She does report a past history of stones.  Has never required surgical intervention.  Denies past urologic evaluation or instrumentation.  She denies hematuria.  She denies prior history of recurrent urinary tract infection.      Subjective      Review of System: Review of Systems   Constitutional: Negative for chills, fatigue, fever and unexpected weight change.   HENT: Negative for sore throat.    Eyes: Negative for visual disturbance.   Respiratory: Negative for cough, chest tightness and shortness of breath.    Cardiovascular: Negative for chest pain and leg swelling.   Gastrointestinal: Negative for blood in stool, constipation, diarrhea, nausea, rectal pain and vomiting.   Genitourinary: Negative for decreased urine volume, difficulty urinating, dysuria, enuresis, flank pain, frequency, genital sores, hematuria and urgency.   Musculoskeletal: Negative for back pain and joint swelling.   Skin: Negative for rash and wound.   Neurological: Negative for  "seizures, speech difficulty, weakness and headaches.   Psychiatric/Behavioral: Negative for confusion, sleep disturbance and suicidal ideas. The patient is not nervous/anxious.       I have reviewed the ROS documented by my clinical staff, updated appropriate and I agree. Vignesh Slaughter MD    Past Medical History:  Past Medical History:   Diagnosis Date   • Acute nonintractable headache     nonspcecified headache type    • Acute otitis externa of left ear    • Acute otitis media    • Allergic    • Anxiety    • Arthritis    • Back pain    • Blurry vision    • Contact dermatitis due to poison ivy    • Depression    • Diarrhea    • Dizziness    • Edema    • Factor V Leiden mutation (HCC)     \"penitentiary\"   • Fatigue    • Flatulence    • Fractures, compound     h/o    • Gastritis    • Gastroenteritis    • GERD (gastroesophageal reflux disease)    • H/O blood clots    • Hair loss    • Hidradenitis    • History of cardiac disorder    • History of kidney infection    • History of seizure disorder    • History of seizures    • Hypertension    • Joint pain, hip    • Low serum vitamin D    • Miscarriage    • Nausea    • Otalgia of left ear    • Pancreatitis    • Rash    • Renal calculi     personal h/o    • Screening breast examination     admits   • Speech complaints    • Vitamin D deficiency        Past Surgical History:  Past Surgical History:   Procedure Laterality Date   • CHOLECYSTECTOMY     • HIP SURGERY      multiple since 5th grade x 12 replacements as well    • TOTAL ABDOMINAL HYSTERECTOMY WITH SALPINGO OOPHORECTOMY Bilateral 2005    menometorrhagia   • TUBAL ABDOMINAL LIGATION         Medications:    Current Outpatient Medications:   •  albuterol sulfate  (90 Base) MCG/ACT inhaler, Inhale 2 puffs Every 4 (Four) Hours As Needed for Wheezing or Shortness of Air., Disp: 18 g, Rfl: 0  •  amLODIPine (NORVASC) 5 MG tablet, Take 1 tablet by mouth Daily., Disp: 90 tablet, Rfl: 3  •  B-12 Methylcobalamin 1000 MCG tablet " dispersible, Place 1,000 mg on the tongue Daily., Disp: 90 tablet, Rfl: 3  •  Banophen 50 MG capsule, , Disp: , Rfl:   •  clindamycin (CLEOCIN T) 1 % lotion, Apply  topically to the appropriate area as directed 2 (Two) Times a Day., Disp: 60 mL, Rfl: 5  •  cloNIDine (CATAPRES) 0.1 MG tablet, Take 1 tablet by mouth 3 (Three) Times a Day As Needed for High Blood Pressure., Disp: 90 tablet, Rfl: 3  •  Coenzyme Q10 (CoQ10) 400 MG capsule, Take 400 mg by mouth Daily., Disp: 90 capsule, Rfl: 4  •  Creon 60765 units capsule delayed-release particles capsule, TAKE 1 CAPSULE BY MOUTH TWICE A DAY WITH MEALS, Disp: 180 capsule, Rfl: 3  •  cyclobenzaprine (FLEXERIL) 10 MG tablet, Take 1 tablet by mouth 3 (Three) Times a Day As Needed for Muscle Spasms., Disp: 270 tablet, Rfl: 3  •  dexlansoprazole (Dexilant) 60 MG capsule, Take 1 capsule by mouth Daily., Disp: 90 capsule, Rfl: 3  •  dicyclomine (BENTYL) 20 MG tablet, Take 1 tablet by mouth 4 (Four) Times a Day., Disp: 120 tablet, Rfl: 5  •  diphenoxylate-atropine (Lomotil) 2.5-0.025 MG per tablet, Take 1 tablet by mouth 4 (Four) Times a Day As Needed for Diarrhea., Disp: 20 tablet, Rfl: 0  •  DULoxetine (Cymbalta) 60 MG capsule, Take 1 capsule by mouth Daily., Disp: 90 capsule, Rfl: 3  •  ergocalciferol (ERGOCALCIFEROL) 1.25 MG (14275 UT) capsule, Take 1 capsule by mouth 1 (One) Time Per Week., Disp: 5 capsule, Rfl: 11  •  fluconazole (DIFLUCAN) 150 MG tablet, , Disp: , Rfl:   •  fluconazole (Diflucan) 200 MG tablet, Take 1 tablet by mouth Daily. Repeat in 3 days., Disp: 2 tablet, Rfl: 0  •  hydrOXYzine (ATARAX) 50 MG tablet, Take 1 tablet by mouth 3 (Three) Times a Day., Disp: 90 tablet, Rfl: 5  •  ibuprofen (ADVIL,MOTRIN) 800 MG tablet, Take 1 tablet by mouth Every 8 (Eight) Hours As Needed for Mild Pain ., Disp: 90 tablet, Rfl: 0  •  imipramine (TOFRANIL) 25 MG tablet, Take 1 tablet by mouth Daily., Disp: 90 tablet, Rfl: 3  •  levocetirizine (XYZAL) 5 MG tablet, Take 5 mg by  mouth Every Evening., Disp: , Rfl:   •  Multiple Vitamin (THERA-TABS) tablet, Take 1 tablet by mouth Every Morning., Disp: , Rfl: 0  •  mupirocin (BACTROBAN) 2 % ointment, Apply 1 application topically to the appropriate area as directed 3 (Three) Times a Day., Disp: 22 g, Rfl: 0  •  nystatin (MYCOSTATIN) 100,000 unit/mL suspension, Swish and swallow 5 mL 4 (Four) Times a Day., Disp: 473 mL, Rfl: 0  •  ondansetron (ZOFRAN) 4 MG tablet, Take 1 tablet by mouth See Admin Instructions. Take 1 tablet by mouth every 6-8 hours as needed, Disp: 30 tablet, Rfl: 5  •  PREMARIN 0.625 MG/GM vaginal cream, , Disp: , Rfl:   •  promethazine (PHENERGAN) 25 MG tablet, Take 1 tablet by mouth Every 6 (Six) Hours As Needed for Nausea or Vomiting., Disp: 30 tablet, Rfl: 5  •  rosuvastatin (Crestor) 5 MG tablet, Take 1 tablet by mouth Daily., Disp: 90 tablet, Rfl: 3  •  terconazole (Terazol 7) 0.4 % vaginal cream, Insert 1 applicator into the vagina Every Night., Disp: 1 each, Rfl: 1    Allergies:  No Known Allergies    Social History:  Social History     Socioeconomic History   • Marital status: Single   Tobacco Use   • Smoking status: Current Every Day Smoker     Packs/day: 1.00     Types: Cigarettes   • Smokeless tobacco: Never Used   Vaping Use   • Vaping Use: Never used   Substance and Sexual Activity   • Alcohol use: Not Currently   • Drug use: Yes     Types: Marijuana   • Sexual activity: Not Currently     Birth control/protection: Tubal ligation       Family History:  Family History   Problem Relation Age of Onset   • Arthritis Mother    • Depression Mother    • Diabetes Mother    • Heart disease Mother    • Hyperlipidemia Mother    • Hypertension Mother    • Stroke Mother    • Arthritis Father    • Depression Father    • Hypertension Father    • Asthma Daughter    • Depression Daughter    • Asthma Son    • Depression Son    • Arthritis Maternal Grandmother    • Cancer Maternal Grandmother    • Depression Maternal Grandmother   "  • Diabetes Maternal Grandmother    • Depression Maternal Grandfather    • Arthritis Paternal Grandmother    • Depression Paternal Grandmother    • Depression Paternal Grandfather    • Arthritis Other    • Mental illness Other    • Breast cancer Neg Hx    • Ovarian cancer Neg Hx    • Uterine cancer Neg Hx    • Colon cancer Neg Hx            Objective     Physical Exam:   Vital Signs:   Vitals:    03/22/22 1517   Pulse: 104   SpO2: 97%   Weight: 94.3 kg (208 lb)   Height: 167.6 cm (65.98\")   PainSc:   8     Body mass index is 33.59 kg/m².     Physical Exam  Vitals and nursing note reviewed.   Constitutional:       Appearance: Normal appearance.   HENT:      Head: Normocephalic and atraumatic.   Cardiovascular:      Comments: Well perfused  Pulmonary:      Effort: Pulmonary effort is normal.   Abdominal:      General: Abdomen is flat.      Palpations: Abdomen is soft.   Musculoskeletal:         General: Normal range of motion.   Skin:     General: Skin is warm and dry.   Neurological:      General: No focal deficit present.      Mental Status: She is alert and oriented to person, place, and time. Mental status is at baseline.   Psychiatric:         Mood and Affect: Mood normal.         Behavior: Behavior normal.         Thought Content: Thought content normal.         Judgment: Judgment normal.         Labs:   Brief Urine Lab Results  (Last result in the past 365 days)      Color   Clarity   Blood   Leuk Est   Nitrite   Protein   CREAT   Urine HCG        03/22/22 1524 Yellow   Clear   Negative   Negative   Negative   Negative               UA negative for nitrite, blood, LE    Urine Culture    Urine Culture 4/12/21 2/24/22 3/17/22   Urine Culture Final report Final report (A) Final report   (A) Abnormal value               Lab Results   Component Value Date    GLUCOSE 89 02/23/2022    CALCIUM 9.8 02/23/2022     02/23/2022    K 4.7 02/23/2022    CO2 24 02/23/2022     02/23/2022    BUN 13 02/23/2022    " CREATININE 1.08 (H) 02/23/2022    EGFRIFAFRI 73 02/23/2022    EGFRIFNONA 63 02/23/2022    BCR 12 02/23/2022    ANIONGAP 12.0 02/16/2022       Lab Results   Component Value Date    WBC 7.0 02/23/2022    HGB 13.2 02/23/2022    HCT 40.0 02/23/2022    MCV 78 (L) 02/23/2022     02/23/2022       Images:   CT Abdomen Pelvis With Contrast    Result Date: 2/16/2022   1.  No acute process seen within the abdomen or pelvis. 2.  Calcifications within the head of the pancreas suggesting changes of prior pancreatitis.  No evidence of acute pancreatitis or biliary tract obstruction.  This report was finalized on 2/16/2022 9:10 PM by Ariel Arzate MD.      I personally viewed CT imaging.  No evidence of nephrolithiasis/ureterolithiasis/hydroureteronephrosis.    Measures:   Tobacco:   Nia Bah  reports that she has been smoking cigarettes. She has been smoking about 1.00 pack per day. She has never used smokeless tobacco.. I have educated her on the risk of diseases from using tobacco products.     Assessment / Plan      Assessment:  Mrs. Bah is a 42 y.o. female who presents today with lower urinary tract symptoms.  Recent E. coli positive urine culture in 2022.  Treated with p.o. course of antibiotics, confirmatory culture in 3/2022 no growth.  She continues to report mild lower urinary tract symptoms including dysuria.  CT imaging from 2022 demonstrates no concern for nephrolithiasis/ureterolithiasis.  She additionally reports abdominal pain.  History of IBS.    Diagnoses and all orders for this visit:    1. Lower urinary tract symptoms (LUTS) (Primary)  -     POC Urinalysis Dipstick, Automated        Patient Education:     Today we discussed in depth the etiology and management of pelvic floor dysfunction.  We discussed the pelvic floor dysfunction is a common condition in which the patient is unable to relax or coordinate the muscles of the pelvic floor with urination, bowel movement or intercourse.  We  discussed that this is a common condition seen both men and women.  We discussed the role of the pelvic musculature and that it supports the bladder, vagina, rectum.  We discussed the intricate and dynamic interplay between the pelvic floor and these associated organs.  Discussed that the pelvic floor musculature added support and stability for these organ systems but can also result in symptoms including muscle spasms, pain with urination, bowel movement or sexual intercourse.  We discussed that the causes of pelvic floor dysfunction are often multifactorial.   We discussed that it can often take a prolonged period of multimodal therapy to improve symptoms.       We also discussed in depth the etiology and management of urinary tract infection and recurrent urinary tract infection.  We discussed the important principal that the definition of a diagnosed UTI requires a urine culture.  We discussed that a presumptive diagnosis of UTI cannot be made with just signs symptoms or urinalysis.  We discussed that urinalysis is not sufficient to diagnose a UTI.  We discussed the difference between unresolved versus recurrent urinary tract infection.  We discussed that recurrent UTIs typically defined as greater than 2 UTIs within 6 months or greater than 3 within 1 year.  Discussed that the evaluation for recurrent UTI should include a history physical exam and a urine culture.  We discussed the recurrent infections can be a single complicated UTI versus a reinfection.  We discussed the indication for evaluation of the urinary tract when recurrent infections are complicated, occur frequently, or and symptoms persist long after an infection is eradicated.  We discussed therapies to prevent recurrence of infections.  Discussed conservative therapies including increased hydration appropriate , urinary hygiene, decreasing constipation, improvement in pelvic floor related symptoms, decreasing irritative food/fluid intake.     We  discussed that at this time she has had a single positive urine culture, treated with culture specific antibiotics with confirmatory no growth culture.  UA today without any concerning findings, no nitrite, LE, blood.    Today we discussed the etiology and management of LUTS/OAB. We discussed work-up including history and physical exam as well as urinalysis.  We discussed PVR.  We discussed evaluation and management of urinary urgency and overactivity of the bladder.  We discussed conservative management and behavioral strategies including decreasing irritative p.o. fluid intake, timed voiding, double voiding.  We discussed the role that constipation can play in lower urinary tract symptoms and improving bowel hygiene.      We have discussed conservative strategies to improve lower urinary tract symptoms.  She may continue to follow with primary care physician for management.       Follow Up:   No follow-ups on file.    I spent approximately 45 minutes providing clinical care for this patient; including review of patient's chart and provider documentation, face to face time spent with patient in examination room (obtaining history, performing physical exam, discussing diagnosis and management options), placing orders, and completing patient documentation.     Vignesh Slaughter MD  [facility] [place]

## 2022-03-24 ENCOUNTER — PATIENT ROUNDING (BHMG ONLY) (OUTPATIENT)
Dept: UROLOGY | Facility: CLINIC | Age: 42
End: 2022-03-24

## 2022-03-24 NOTE — PROGRESS NOTES
A [employer] message has been sent to the patient for PATIENT ROUNDING with [facility].

## 2022-03-27 DIAGNOSIS — M79.7 FIBROMYALGIA: ICD-10-CM

## 2022-03-27 DIAGNOSIS — K86.1 IDIOPATHIC CHRONIC PANCREATITIS: ICD-10-CM

## 2022-03-28 RX ORDER — IBUPROFEN 800 MG/1
800 TABLET ORAL EVERY 8 HOURS PRN
Qty: 90 TABLET | Refills: 0 | OUTPATIENT
Start: 2022-03-28

## 2022-03-29 ENCOUNTER — OFFICE VISIT (OUTPATIENT)
Dept: GASTROENTEROLOGY | Facility: CLINIC | Age: 42
End: 2022-03-29

## 2022-03-29 VITALS
HEIGHT: 66 IN | SYSTOLIC BLOOD PRESSURE: 122 MMHG | HEART RATE: 62 BPM | TEMPERATURE: 97.1 F | OXYGEN SATURATION: 100 % | DIASTOLIC BLOOD PRESSURE: 78 MMHG | BODY MASS INDEX: 33.85 KG/M2 | WEIGHT: 210.6 LBS

## 2022-03-29 DIAGNOSIS — R11.2 NAUSEA AND VOMITING IN ADULT: ICD-10-CM

## 2022-03-29 DIAGNOSIS — R14.0 BLOATING: ICD-10-CM

## 2022-03-29 DIAGNOSIS — R68.81 EARLY SATIETY: ICD-10-CM

## 2022-03-29 DIAGNOSIS — K59.00 CONSTIPATION, UNSPECIFIED CONSTIPATION TYPE: ICD-10-CM

## 2022-03-29 DIAGNOSIS — K21.00 GASTROESOPHAGEAL REFLUX DISEASE WITH ESOPHAGITIS WITHOUT HEMORRHAGE: Primary | ICD-10-CM

## 2022-03-29 DIAGNOSIS — R10.13 EPIGASTRIC PAIN: ICD-10-CM

## 2022-03-29 PROCEDURE — 99215 OFFICE O/P EST HI 40 MIN: CPT | Performed by: PHYSICIAN ASSISTANT

## 2022-03-29 NOTE — PROGRESS NOTES
Follow Up      Patient Name: Chencho Bah  : 1980   MRN: [id number]     Chief Complaint:    Chief Complaint   Patient presents with   • Abdominal Pain     Bloating        History of Present Illness: Chencho Bah is a 42 y.o. female, PMH includes fibromyalgia, anxiety, depression, GERD, pancreatic divisum, ?Factor V Leiden, who is here today for follow up on abdominal pain, vomiting and constipation.     Pt states that she continues to experience daily, constant, stabbing epigastric pain. She reports decreased appetite, early satiety, constant nausea and emesis of bile or undigested food if she does not take her creon as prescribed. She notes salad, tomato-based sauces are particular triggers. She takes dexilant 60mg PO daily.     Pt c/o ongoing constipation for the last month. She typically passes a small amount of hard, dry stool one day per week. She notes intermittent episodes of diarrhea as well, but always notes incomplete evacuation. She denies rectal pain, bleeding or fullness. Historically normal fecal elastase (2020).     She also notes that she has had frequent oral thrush lately, treated within last month with oral fluconazole. She has been treated multiple times in last year with abx for AOM, UTI. She denies use of inhalers, etc.     Patient denies associated fever, chills, hematemesis, dysphagia, hematochezia, melena, weight loss or gain, dysuria, jaundice or bruising. She continues to smoke 1/2ppd. She takes ibuprofen daily for various myalgias.     CT A/P w/ contrast : No acute process in abdomen or pelvis. Calcifications in head of pancreas suggesting prior pancreatitis, without acute pancreatitis.     EGD 3/2021 with Dr. Guillen. LA Grade A esophagitis. Hiatal hernia. Gastritis without bleeding. Bx negative for celiac disease or H Pylori.     CSY 2019 with Dr. Khan. Excellent colon prep conditions. Entire colon appeared normal.     Subjective      Review of  Systems:   Review of Systems   Constitutional: Negative for appetite change, chills, diaphoresis, fatigue, fever, unexpected weight gain and unexpected weight loss.   HENT: Negative for drooling, facial swelling, mouth sores, rhinorrhea, sore throat, tinnitus, trouble swallowing and voice change.    Eyes: Negative.    Respiratory: Negative for cough, chest tightness and shortness of breath.    Cardiovascular: Negative for chest pain.   Gastrointestinal: Positive for abdominal pain (epigastric), constipation, diarrhea, nausea and vomiting. Negative for blood in stool, GERD and indigestion.   Genitourinary: Negative for dysuria, flank pain, hematuria and pelvic pain.   Musculoskeletal: Negative for arthralgias and myalgias.   Skin: Negative for color change, pallor and rash.   Neurological: Negative for dizziness, tremors, syncope, weakness and numbness.   Psychiatric/Behavioral: Negative for hallucinations and sleep disturbance. The patient is not nervous/anxious.    All other systems reviewed and are negative.      Medications:     Current Outpatient Medications:   •  albuterol sulfate  (90 Base) MCG/ACT inhaler, Inhale 2 puffs Every 4 (Four) Hours As Needed for Wheezing or Shortness of Air., Disp: 18 g, Rfl: 0  •  amLODIPine (NORVASC) 5 MG tablet, Take 1 tablet by mouth Daily., Disp: 90 tablet, Rfl: 3  •  B-12 Methylcobalamin 1000 MCG tablet dispersible, Place 1,000 mg on the tongue Daily., Disp: 90 tablet, Rfl: 3  •  Banophen 50 MG capsule, , Disp: , Rfl:   •  clindamycin (CLEOCIN T) 1 % lotion, Apply  topically to the appropriate area as directed 2 (Two) Times a Day., Disp: 60 mL, Rfl: 5  •  cloNIDine (CATAPRES) 0.1 MG tablet, Take 1 tablet by mouth 3 (Three) Times a Day As Needed for High Blood Pressure., Disp: 90 tablet, Rfl: 3  •  Coenzyme Q10 (CoQ10) 400 MG capsule, Take 400 mg by mouth Daily., Disp: 90 capsule, Rfl: 4  •  Creon 42686 units capsule delayed-release particles capsule, TAKE 1 CAPSULE BY  MOUTH TWICE A DAY WITH MEALS, Disp: 180 capsule, Rfl: 3  •  cyclobenzaprine (FLEXERIL) 10 MG tablet, Take 1 tablet by mouth 3 (Three) Times a Day As Needed for Muscle Spasms., Disp: 270 tablet, Rfl: 3  •  dexlansoprazole (Dexilant) 60 MG capsule, Take 1 capsule by mouth Daily., Disp: 90 capsule, Rfl: 3  •  dicyclomine (BENTYL) 20 MG tablet, Take 1 tablet by mouth 4 (Four) Times a Day., Disp: 120 tablet, Rfl: 5  •  diphenoxylate-atropine (Lomotil) 2.5-0.025 MG per tablet, Take 1 tablet by mouth 4 (Four) Times a Day As Needed for Diarrhea., Disp: 20 tablet, Rfl: 0  •  DULoxetine (Cymbalta) 60 MG capsule, Take 1 capsule by mouth Daily., Disp: 90 capsule, Rfl: 3  •  ergocalciferol (ERGOCALCIFEROL) 1.25 MG (31283 UT) capsule, Take 1 capsule by mouth 1 (One) Time Per Week., Disp: 5 capsule, Rfl: 11  •  fluconazole (DIFLUCAN) 150 MG tablet, , Disp: , Rfl:   •  fluconazole (Diflucan) 200 MG tablet, Take 1 tablet by mouth Daily. Repeat in 3 days., Disp: 2 tablet, Rfl: 0  •  hydrOXYzine (ATARAX) 50 MG tablet, Take 1 tablet by mouth 3 (Three) Times a Day., Disp: 90 tablet, Rfl: 5  •  ibuprofen (ADVIL,MOTRIN) 800 MG tablet, Take 1 tablet by mouth Every 8 (Eight) Hours As Needed for Mild Pain ., Disp: 90 tablet, Rfl: 0  •  imipramine (TOFRANIL) 25 MG tablet, Take 1 tablet by mouth Daily., Disp: 90 tablet, Rfl: 3  •  levocetirizine (XYZAL) 5 MG tablet, Take 5 mg by mouth Every Evening., Disp: , Rfl:   •  Multiple Vitamin (THERA-TABS) tablet, Take 1 tablet by mouth Every Morning., Disp: , Rfl: 0  •  mupirocin (BACTROBAN) 2 % ointment, Apply 1 application topically to the appropriate area as directed 3 (Three) Times a Day., Disp: 22 g, Rfl: 0  •  nystatin (MYCOSTATIN) 100,000 unit/mL suspension, Swish and swallow 5 mL 4 (Four) Times a Day., Disp: 473 mL, Rfl: 0  •  ondansetron (ZOFRAN) 4 MG tablet, Take 1 tablet by mouth See Admin Instructions. Take 1 tablet by mouth every 6-8 hours as needed, Disp: 30 tablet, Rfl: 5  •  PREMARIN  "0.625 MG/GM vaginal cream, , Disp: , Rfl:   •  promethazine (PHENERGAN) 25 MG tablet, Take 1 tablet by mouth Every 6 (Six) Hours As Needed for Nausea or Vomiting., Disp: 30 tablet, Rfl: 5  •  rosuvastatin (Crestor) 5 MG tablet, Take 1 tablet by mouth Daily., Disp: 90 tablet, Rfl: 3  •  terconazole (Terazol 7) 0.4 % vaginal cream, Insert 1 applicator into the vagina Every Night., Disp: 1 each, Rfl: 1    Allergies:   No Known Allergies    Social History:   Social History     Socioeconomic History   • Marital status: Single   Tobacco Use   • Smoking status: Current Every Day Smoker     Packs/day: 1.00     Types: Cigarettes   • Smokeless tobacco: Never Used   Vaping Use   • Vaping Use: Never used   Substance and Sexual Activity   • Alcohol use: Not Currently   • Drug use: Yes     Types: Marijuana   • Sexual activity: Not Currently     Birth control/protection: Tubal ligation        Surgical History:   Past Surgical History:   Procedure Laterality Date   • CHOLECYSTECTOMY     • COLONOSCOPY     • HIP SURGERY      multiple since 5th grade x 12 replacements as well    • TOTAL ABDOMINAL HYSTERECTOMY WITH SALPINGO OOPHORECTOMY Bilateral 2005    menometorrhagia   • TUBAL ABDOMINAL LIGATION     • UPPER GASTROINTESTINAL ENDOSCOPY          Medical History:   Past Medical History:   Diagnosis Date   • Acute nonintractable headache     nonspcecified headache type    • Acute otitis externa of left ear    • Acute otitis media    • Allergic    • Anxiety    • Arthritis    • Back pain    • Blurry vision    • Contact dermatitis due to poison ivy    • Depression    • Diarrhea    • Dizziness    • Edema    • Factor V Leiden mutation (HCC)     \"shelter\"   • Fatigue    • Flatulence    • Fractures, compound     h/o    • Gastritis    • Gastroenteritis    • GERD (gastroesophageal reflux disease)    • H/O blood clots    • Hair loss    • Hidradenitis    • History of cardiac disorder    • History of kidney infection    • History of seizure disorder " "   • History of seizures    • Hypertension    • Joint pain, hip    • Low serum vitamin D    • Miscarriage    • Nausea    • Otalgia of left ear    • Pancreatitis    • Rash    • Renal calculi     personal h/o    • Screening breast examination     admits   • Speech complaints    • Vitamin D deficiency         Objective     Physical Exam:  Vital Signs:   Vitals:    03/29/22 1011   BP: 122/78   BP Location: Right arm   Patient Position: Sitting   Cuff Size: Adult   Pulse: 62   Temp: 97.1 °F (36.2 °C)   TempSrc: Temporal   SpO2: 100%   Weight: 95.5 kg (210 lb 9.6 oz)   Height: 167.6 cm (65.98\")     Body mass index is 34.01 kg/m².     Physical Exam  Vitals and nursing note reviewed.   Constitutional:       Appearance: Normal appearance. She is obese. She is not ill-appearing or diaphoretic.   HENT:      Head: Normocephalic and atraumatic.      Right Ear: External ear normal.      Left Ear: External ear normal.      Nose: Nose normal. No rhinorrhea.      Mouth/Throat:      Mouth: Mucous membranes are moist.      Pharynx: Oropharynx is clear. No posterior oropharyngeal erythema.   Eyes:      General:         Right eye: No discharge.         Left eye: No discharge.      Conjunctiva/sclera: Conjunctivae normal.      Pupils: Pupils are equal, round, and reactive to light.   Neck:      Thyroid: No thyromegaly.   Cardiovascular:      Rate and Rhythm: Normal rate and regular rhythm.      Pulses: Normal pulses.      Heart sounds: Normal heart sounds. No murmur heard.  Pulmonary:      Effort: Pulmonary effort is normal.      Breath sounds: Normal breath sounds. No wheezing.   Abdominal:      General: Abdomen is flat. Bowel sounds are normal. There is no distension.      Tenderness: There is abdominal tenderness (epigastric).      Comments: Body habitus limits physical exam   Musculoskeletal:         General: No tenderness. Normal range of motion.      Cervical back: Normal range of motion and neck supple.      Right lower leg: No " edema.      Left lower leg: No edema.   Skin:     General: Skin is warm and dry.      Capillary Refill: Capillary refill takes less than 2 seconds.      Coloration: Skin is not jaundiced.      Findings: No bruising.   Neurological:      General: No focal deficit present.      Mental Status: She is oriented to person, place, and time.      Cranial Nerves: No cranial nerve deficit.   Psychiatric:         Mood and Affect: Mood normal.         Thought Content: Thought content normal.         Judgment: Judgment normal.         Assessment / Plan      Assessment/Plan:   There are no diagnoses linked to this encounter.     GERD  Epigastric pain  Nausea and vomiting  Constipation  Bloating  Early satiety   - continue dexilant, creon as prescribed   - pt given GERD diet instructions, advised to avoid GI irritants such as caffeine, carbonation, EtOH, tobacco, chocolate, peppermint, acid-based foods   - obtain food allergy profile   - obtain GES for early satiety / epigastric pain / nausea   - obtain KUB to assess stool burden   - schedule for EGD / CSY if sx persist   - highly suspect functional abdominal pain given breadth and chronicity of problems; consider trial of TCA pending above workup   - follow up in clinic in 2mo, or after completion of above studies   - call clinic at any time for questions or new / worsened sx    Follow Up:   Return in about 2 months (around 5/29/2022).    Plan of care reviewed with the patient at the conclusion of today's visit.  Education was provided regarding diagnosis, management, and any prescribed or recommended OTC medications.  Patient verbalized understanding of and agreement with management plan.     Time Statement:   Discussed plan of care in detail with patient today. Patient verbally understands and agrees. I have spent 45 minutes reviewing available diagnostics, obtaining history, examining the patient, developing a treatment plan, and educating the patient on disease process and  plan of care.     Cielo Culver PA-C   [facility] – [facility]

## 2022-04-27 ENCOUNTER — OFFICE VISIT (OUTPATIENT)
Dept: FAMILY MEDICINE CLINIC | Facility: CLINIC | Age: 42
End: 2022-04-27

## 2022-04-27 VITALS
SYSTOLIC BLOOD PRESSURE: 102 MMHG | HEART RATE: 101 BPM | BODY MASS INDEX: 32.5 KG/M2 | WEIGHT: 202.2 LBS | DIASTOLIC BLOOD PRESSURE: 74 MMHG | HEIGHT: 66 IN | OXYGEN SATURATION: 100 % | TEMPERATURE: 97.7 F | RESPIRATION RATE: 18 BRPM

## 2022-04-27 DIAGNOSIS — M62.838 MUSCLE SPASM: ICD-10-CM

## 2022-04-27 DIAGNOSIS — F43.21 GRIEF REACTION: ICD-10-CM

## 2022-04-27 DIAGNOSIS — F41.8 DEPRESSION WITH ANXIETY: ICD-10-CM

## 2022-04-27 DIAGNOSIS — R68.2 DRY MOUTH: ICD-10-CM

## 2022-04-27 DIAGNOSIS — R11.0 NAUSEA: ICD-10-CM

## 2022-04-27 DIAGNOSIS — K86.1 IDIOPATHIC CHRONIC PANCREATITIS: ICD-10-CM

## 2022-04-27 DIAGNOSIS — K13.79 ORAL PAIN OF UNKNOWN ETIOLOGY: Primary | ICD-10-CM

## 2022-04-27 DIAGNOSIS — M79.7 FIBROMYALGIA: ICD-10-CM

## 2022-04-27 PROBLEM — L23.7 CONTACT DERMATITIS DUE TO POISON IVY: Status: RESOLVED | Noted: 2021-03-10 | Resolved: 2022-04-27

## 2022-04-27 PROCEDURE — 99213 OFFICE O/P EST LOW 20 MIN: CPT | Performed by: FAMILY MEDICINE

## 2022-04-27 RX ORDER — LEVOCETIRIZINE DIHYDROCHLORIDE 5 MG/1
5 TABLET, FILM COATED ORAL EVERY EVENING
Qty: 30 TABLET | Refills: 5 | Status: SHIPPED | OUTPATIENT
Start: 2022-04-27 | End: 2022-05-12

## 2022-04-27 RX ORDER — PROMETHAZINE HYDROCHLORIDE 25 MG/1
25 TABLET ORAL EVERY 6 HOURS PRN
Qty: 30 TABLET | Refills: 5 | Status: SHIPPED | OUTPATIENT
Start: 2022-04-27

## 2022-04-27 RX ORDER — IBUPROFEN 800 MG/1
800 TABLET ORAL EVERY 8 HOURS PRN
Qty: 90 TABLET | Refills: 0 | Status: SHIPPED | OUTPATIENT
Start: 2022-04-27 | End: 2022-06-20

## 2022-04-27 NOTE — PROGRESS NOTES
"Chief Complaint   Patient presents with   • Thrush     Had for months   • Spasms     Legs, neck and feet       Subjective      Chencho Bah is a 42 y.o. who presents for \"thrush\" that she has been seen for multiple times over the last 3 months and prescribed several antifungals without resolution.  As recently as last week she saw a dentist who confirmed the presence of thrush.  She reports oral pain and sensitivity.    Patient also admits she is struggling due to the recent loss of her fiancé who  earlier in the month.  This is exacerbating her anxiety.  She has noticed increase  physical symptoms of chest pain, upset stomach, muscular tightness.    Objective   Vital Signs:  /74   Pulse 101   Temp 97.7 °F (36.5 °C)   Resp 18   Ht 167.6 cm (65.98\")   Wt 91.7 kg (202 lb 3.2 oz)   SpO2 100%   BMI 32.65 kg/m²             Physical Exam  HENT:      Head: Normocephalic and atraumatic.      Nose: Nose normal.      Mouth/Throat:      Mouth: Mucous membranes are dry.      Pharynx: Oropharynx is clear. No oropharyngeal exudate or posterior oropharyngeal erythema.      Comments: No thrush  Eyes:      Extraocular Movements: Extraocular movements intact.      Pupils: Pupils are equal, round, and reactive to light.   Cardiovascular:      Rate and Rhythm: Normal rate and regular rhythm.      Pulses: Normal pulses.      Heart sounds: Normal heart sounds.   Abdominal:      General: Abdomen is flat.      Palpations: Abdomen is soft.      Tenderness: There is no rebound.   Musculoskeletal:      Cervical back: Normal range of motion and neck supple.   Neurological:      Mental Status: She is alert.          Result Review                     Assessment and Plan  Diagnoses and all orders for this visit:    1. Oral pain of unknown etiology (Primary)  Comments:  Suspect dry mouth caused by multiple meds with anticholinergic properties.  Discussed future discontinuation.  Use nystatin as needed.  No thrush " today    2. Dry mouth    3. Grief reaction  Comments:  Refer to behavioral health for patient's long history of depression and anxiety now complicated by grief  Orders:  -     Ambulatory Referral to Behavioral Health    4. Muscle spasm    5. Depression with anxiety  -     Ambulatory Referral to Behavioral Health    6. Fibromyalgia  Comments:  Refill ibuprofen.  Orders:  -     ibuprofen (ADVIL,MOTRIN) 800 MG tablet; Take 1 tablet by mouth Every 8 (Eight) Hours As Needed for Mild Pain .  Dispense: 90 tablet; Refill: 0    7. Idiopathic chronic pancreatitis (HCC)  Comments:  Keep appt with GI  Orders:  -     ibuprofen (ADVIL,MOTRIN) 800 MG tablet; Take 1 tablet by mouth Every 8 (Eight) Hours As Needed for Mild Pain .  Dispense: 90 tablet; Refill: 0    8. Nausea  Comments:  Refill promethazine  Orders:  -     promethazine (PHENERGAN) 25 MG tablet; Take 1 tablet by mouth Every 6 (Six) Hours As Needed for Nausea or Vomiting.  Dispense: 30 tablet; Refill: 5    Other orders  -     levocetirizine (XYZAL) 5 MG tablet; Take 1 tablet by mouth Every Evening.  Dispense: 30 tablet; Refill: 5            Follow Up  No follow-ups on file.  Patient was given instructions and counseling regarding her condition or for health maintenance advice. Please see specific information pulled into the AVS if appropriate.

## 2022-05-08 DIAGNOSIS — K86.1 OTHER CHRONIC PANCREATITIS: ICD-10-CM

## 2022-05-09 RX ORDER — PANCRELIPASE 36000; 180000; 114000 [USP'U]/1; [USP'U]/1; [USP'U]/1
CAPSULE, DELAYED RELEASE PELLETS ORAL
Qty: 180 CAPSULE | Refills: 3 | Status: SHIPPED | OUTPATIENT
Start: 2022-05-09

## 2022-05-10 ENCOUNTER — OFFICE VISIT (OUTPATIENT)
Dept: BEHAVIORAL HEALTH | Facility: CLINIC | Age: 42
End: 2022-05-10

## 2022-05-10 VITALS
HEIGHT: 66 IN | WEIGHT: 202 LBS | DIASTOLIC BLOOD PRESSURE: 92 MMHG | BODY MASS INDEX: 32.47 KG/M2 | SYSTOLIC BLOOD PRESSURE: 118 MMHG

## 2022-05-10 DIAGNOSIS — F43.10 POST TRAUMATIC STRESS DISORDER (PTSD): ICD-10-CM

## 2022-05-10 DIAGNOSIS — F43.23 ADJUSTMENT DISORDER WITH MIXED ANXIETY AND DEPRESSED MOOD: Primary | ICD-10-CM

## 2022-05-10 DIAGNOSIS — F43.21 GRIEF: Primary | ICD-10-CM

## 2022-05-10 DIAGNOSIS — F41.8 DEPRESSION WITH ANXIETY: ICD-10-CM

## 2022-05-10 PROCEDURE — 90792 PSYCH DIAG EVAL W/MED SRVCS: CPT

## 2022-05-10 PROCEDURE — 90791 PSYCH DIAGNOSTIC EVALUATION: CPT | Performed by: SOCIAL WORKER

## 2022-05-10 RX ORDER — BUSPIRONE HYDROCHLORIDE 15 MG/1
15 TABLET ORAL 2 TIMES DAILY
Qty: 60 TABLET | Refills: 2 | Status: SHIPPED | OUTPATIENT
Start: 2022-05-10 | End: 2022-07-13 | Stop reason: CLARIF

## 2022-05-10 RX ORDER — HYDROXYZINE PAMOATE 100 MG/1
100 CAPSULE ORAL 3 TIMES DAILY PRN
Qty: 90 CAPSULE | Refills: 1 | Status: SHIPPED | OUTPATIENT
Start: 2022-05-10 | End: 2022-07-13 | Stop reason: CLARIF

## 2022-05-10 RX ORDER — BUSPIRONE HYDROCHLORIDE 15 MG/1
15 TABLET ORAL 2 TIMES DAILY
Qty: 60 TABLET | Refills: 2 | Status: SHIPPED | OUTPATIENT
Start: 2022-05-10 | End: 2022-05-10
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
## 2022-05-12 ENCOUNTER — TELEPHONE (OUTPATIENT)
Dept: FAMILY MEDICINE CLINIC | Facility: CLINIC | Age: 42
End: 2022-05-12

## 2022-05-12 ENCOUNTER — OFFICE VISIT (OUTPATIENT)
Dept: FAMILY MEDICINE CLINIC | Facility: CLINIC | Age: 42
End: 2022-05-12

## 2022-05-12 VITALS
BODY MASS INDEX: 32.54 KG/M2 | TEMPERATURE: 96.3 F | OXYGEN SATURATION: 97 % | SYSTOLIC BLOOD PRESSURE: 112 MMHG | HEIGHT: 66 IN | WEIGHT: 202.5 LBS | RESPIRATION RATE: 16 BRPM | DIASTOLIC BLOOD PRESSURE: 76 MMHG | HEART RATE: 100 BPM

## 2022-05-12 DIAGNOSIS — E78.2 MIXED HYPERLIPIDEMIA: ICD-10-CM

## 2022-05-12 DIAGNOSIS — K86.1 CHRONIC PANCREATITIS, UNSPECIFIED PANCREATITIS TYPE: ICD-10-CM

## 2022-05-12 DIAGNOSIS — K13.79 ORAL PAIN: Primary | ICD-10-CM

## 2022-05-12 DIAGNOSIS — Z51.81 MEDICATION MONITORING ENCOUNTER: ICD-10-CM

## 2022-05-12 DIAGNOSIS — R73.03 PRE-DIABETES: ICD-10-CM

## 2022-05-12 DIAGNOSIS — E55.9 VITAMIN D DEFICIENCY: ICD-10-CM

## 2022-05-12 DIAGNOSIS — L08.9 SKIN INFECTION: ICD-10-CM

## 2022-05-12 DIAGNOSIS — L73.2 HIDRADENITIS SUPPURATIVA: ICD-10-CM

## 2022-05-12 DIAGNOSIS — R53.82 CHRONIC FATIGUE: ICD-10-CM

## 2022-05-12 PROCEDURE — 99214 OFFICE O/P EST MOD 30 MIN: CPT | Performed by: PHYSICIAN ASSISTANT

## 2022-05-12 RX ORDER — CLINDAMYCIN PHOSPHATE 10 UG/ML
LOTION TOPICAL 2 TIMES DAILY
Qty: 60 ML | Refills: 5 | Status: SHIPPED | OUTPATIENT
Start: 2022-05-12 | End: 2022-08-17

## 2022-05-12 RX ORDER — CHLORHEXIDINE GLUCONATE 4 G/100ML
SOLUTION TOPICAL DAILY PRN
Qty: 236 ML | Refills: 2 | Status: SHIPPED | OUTPATIENT
Start: 2022-05-12

## 2022-05-12 NOTE — TELEPHONE ENCOUNTER
Pt called in stating that her medication had recently been changed and it was increased a lot hydrOXYzine pamoate (VISTARIL) 100 MG capsule and today she is having a lot of anixety and feels like she might lose her job because of it.

## 2022-05-12 NOTE — PROGRESS NOTES
"Subjective   Chencho Bah is a 42 y.o. female.     History of Present Illness   Continuous dry mouth and sore tongue. Has been treated for thrush multiple times without relief of symptoms   Multiple recent medication changes through GI and behavioral health   Will review meds today and try to eliminate any medications that may be exacerbating her symptoms   Chronic pancreatitis. Recently no showed appointment. Has not rescheduled. Directed to take creon and dexilant   Fibromyalgia. Managed with Cymbalta and ibuprofen   Has hidradenitis requesting refill on hibiclens and clindamycin     The following portions of the patient's history were reviewed and updated as appropriate: allergies, current medications, past family history, past medical history, past social history, past surgical history and problem list.    Review of Systems  As noted per hpi     Objective    Blood pressure 112/76, pulse 100, temperature 96.3 °F (35.7 °C), resp. rate 16, height 167.6 cm (66\"), weight 91.9 kg (202 lb 8 oz), SpO2 97 %, not currently breastfeeding.     Physical Exam  Vitals and nursing note reviewed.   Constitutional:       Appearance: Normal appearance.   HENT:      Head: Normocephalic.      Right Ear: External ear normal.      Left Ear: External ear normal.      Nose: Nose normal.      Mouth/Throat:      Pharynx: No oropharyngeal exudate or posterior oropharyngeal erythema.   Eyes:      Conjunctiva/sclera: Conjunctivae normal.   Cardiovascular:      Rate and Rhythm: Normal rate and regular rhythm.      Heart sounds: Normal heart sounds.   Pulmonary:      Effort: Pulmonary effort is normal.      Breath sounds: Normal breath sounds.   Skin:     General: Skin is warm and dry.   Neurological:      Mental Status: She is alert and oriented to person, place, and time.         Assessment & Plan   Diagnoses and all orders for this visit:    1. Oral pain (Primary)  - several redundant antihistamines prescribed by outside offices " d/c. Just taking hydroxyzine at this time for itching, anxiety and allergies     2. Hidradenitis suppurativa  -     clindamycin (CLEOCIN T) 1 % lotion; Apply  topically to the appropriate area as directed 2 (Two) Times a Day.  Dispense: 60 mL; Refill: 5    3. Skin infection  -     chlorhexidine (HIBICLENS) 4 % external liquid; Apply  topically to the appropriate area as directed Daily As Needed for Wound Care.  Dispense: 236 mL; Refill: 2    4. Pre-diabetes  -     CBC w AUTO Differential  -     Comprehensive metabolic panel  -     Hemoglobin A1c    5. Mixed hyperlipidemia  -     Lipid Panel    6. Chronic fatigue  -     TSH  -     T4, free  -     Vitamin B12  -     Iron Profile    7. Vitamin D deficiency  -     Vitamin D 25 hydroxy    8. Chronic pancreatitis, unspecified pancreatitis type (HCC)  -     Lipase    9. Medication monitoring encounter  -     Magnesium    several redundant antihistamines prescribed by outside offices d/c. Just taking hydroxyzine at this time for itching, anxiety and allergies. Will see if this helps with mouth concerns. Stay well hydrated   Labs as outlined in plan

## 2022-05-12 NOTE — TELEPHONE ENCOUNTER
"Talked with Mrs. Bah r/t high anxiety and Panic attack.   She left work r/t her anxiety.  She was in a panicked state when I spoke with her.   I was able to calm her with deep breathing exercises.  She also was able to take a Vistaril while I was on the phone with her.   I  encouraged her to take the day off from work.  Unfortunately she stated \"I have to go back to work, there is no one to cover for me\".  I continued to reassure her and calm her down through deep breathing.  I also encouraged her in the event of emergencies relation or if she felt she was a danger to self or others to go to local emergency room.  She stated \"I understand\".  Before termination of the phone call I reminded her that she can take her Vistaril as a rescue medicine up to 3 times a day.  She then stated that she had to leave to get to work and hung up.  "

## 2022-05-13 LAB
25(OH)D3+25(OH)D2 SERPL-MCNC: 37.6 NG/ML (ref 30–100)
ALBUMIN SERPL-MCNC: 4.2 G/DL (ref 3.5–5.2)
ALBUMIN/GLOB SERPL: 1.6 G/DL
ALP SERPL-CCNC: 131 U/L (ref 39–117)
ALT SERPL-CCNC: 20 U/L (ref 1–33)
AST SERPL-CCNC: 14 U/L (ref 1–32)
BASOPHILS # BLD AUTO: 0.03 10*3/MM3 (ref 0–0.2)
BASOPHILS NFR BLD AUTO: 0.5 % (ref 0–1.5)
BILIRUB SERPL-MCNC: <0.2 MG/DL (ref 0–1.2)
BUN SERPL-MCNC: 13 MG/DL (ref 6–20)
BUN/CREAT SERPL: 13.8 (ref 7–25)
CALCIUM SERPL-MCNC: 10.2 MG/DL (ref 8.6–10.5)
CHLORIDE SERPL-SCNC: 104 MMOL/L (ref 98–107)
CHOLEST SERPL-MCNC: 216 MG/DL (ref 0–200)
CO2 SERPL-SCNC: 25.2 MMOL/L (ref 22–29)
CREAT SERPL-MCNC: 0.94 MG/DL (ref 0.57–1)
EGFRCR SERPLBLD CKD-EPI 2021: 77.9 ML/MIN/1.73
EOSINOPHIL # BLD AUTO: 0.16 10*3/MM3 (ref 0–0.4)
EOSINOPHIL NFR BLD AUTO: 2.5 % (ref 0.3–6.2)
ERYTHROCYTE [DISTWIDTH] IN BLOOD BY AUTOMATED COUNT: 13.7 % (ref 12.3–15.4)
GLOBULIN SER CALC-MCNC: 2.7 GM/DL
GLUCOSE SERPL-MCNC: 95 MG/DL (ref 65–99)
HBA1C MFR BLD: 5.9 % (ref 4.8–5.6)
HCT VFR BLD AUTO: 37.2 % (ref 34–46.6)
HDLC SERPL-MCNC: 43 MG/DL (ref 40–60)
HGB BLD-MCNC: 12.7 G/DL (ref 12–15.9)
IMM GRANULOCYTES # BLD AUTO: 0.01 10*3/MM3 (ref 0–0.05)
IMM GRANULOCYTES NFR BLD AUTO: 0.2 % (ref 0–0.5)
IRON SATN MFR SERPL: 16 % (ref 20–50)
IRON SERPL-MCNC: 61 MCG/DL (ref 37–145)
LDLC SERPL CALC-MCNC: 154 MG/DL (ref 0–100)
LIPASE SERPL-CCNC: 30 U/L (ref 13–60)
LYMPHOCYTES # BLD AUTO: 2.86 10*3/MM3 (ref 0.7–3.1)
LYMPHOCYTES NFR BLD AUTO: 44.3 % (ref 19.6–45.3)
MAGNESIUM SERPL-MCNC: 2 MG/DL (ref 1.6–2.6)
MCH RBC QN AUTO: 26.4 PG (ref 26.6–33)
MCHC RBC AUTO-ENTMCNC: 34.1 G/DL (ref 31.5–35.7)
MCV RBC AUTO: 77.3 FL (ref 79–97)
MONOCYTES # BLD AUTO: 0.47 10*3/MM3 (ref 0.1–0.9)
MONOCYTES NFR BLD AUTO: 7.3 % (ref 5–12)
NEUTROPHILS # BLD AUTO: 2.92 10*3/MM3 (ref 1.7–7)
NEUTROPHILS NFR BLD AUTO: 45.2 % (ref 42.7–76)
NRBC BLD AUTO-RTO: 0 /100 WBC (ref 0–0.2)
PLATELET # BLD AUTO: 360 10*3/MM3 (ref 140–450)
POTASSIUM SERPL-SCNC: 4.2 MMOL/L (ref 3.5–5.2)
PROT SERPL-MCNC: 6.9 G/DL (ref 6–8.5)
RBC # BLD AUTO: 4.81 10*6/MM3 (ref 3.77–5.28)
SODIUM SERPL-SCNC: 139 MMOL/L (ref 136–145)
T4 FREE SERPL-MCNC: 0.99 NG/DL (ref 0.93–1.7)
TIBC SERPL-MCNC: 371 MCG/DL
TRIGL SERPL-MCNC: 103 MG/DL (ref 0–150)
TSH SERPL DL<=0.005 MIU/L-ACNC: 1.57 UIU/ML (ref 0.27–4.2)
UIBC SERPL-MCNC: 310 MCG/DL (ref 112–346)
VIT B12 SERPL-MCNC: 657 PG/ML (ref 211–946)
VLDLC SERPL CALC-MCNC: 19 MG/DL (ref 5–40)
WBC # BLD AUTO: 6.45 10*3/MM3 (ref 3.4–10.8)

## 2022-05-16 ENCOUNTER — TELEPHONE (OUTPATIENT)
Dept: FAMILY MEDICINE CLINIC | Facility: CLINIC | Age: 42
End: 2022-05-16

## 2022-05-18 ENCOUNTER — TELEMEDICINE (OUTPATIENT)
Dept: FAMILY MEDICINE CLINIC | Facility: CLINIC | Age: 42
End: 2022-05-18

## 2022-05-18 DIAGNOSIS — R05.9 COUGH: ICD-10-CM

## 2022-05-18 DIAGNOSIS — J06.9 ACUTE URI: Primary | ICD-10-CM

## 2022-05-18 DIAGNOSIS — H92.02 OTALGIA, LEFT EAR: ICD-10-CM

## 2022-05-18 PROCEDURE — 99213 OFFICE O/P EST LOW 20 MIN: CPT | Performed by: PHYSICIAN ASSISTANT

## 2022-05-18 RX ORDER — CEFDINIR 300 MG/1
300 CAPSULE ORAL 2 TIMES DAILY
Qty: 14 CAPSULE | Refills: 0 | Status: SHIPPED | OUTPATIENT
Start: 2022-05-18 | End: 2022-07-18

## 2022-05-18 RX ORDER — NEOMYCIN SULFATE, POLYMYXIN B SULFATE AND HYDROCORTISONE 10; 3.5; 1 MG/ML; MG/ML; [USP'U]/ML
3 SUSPENSION/ DROPS AURICULAR (OTIC) 4 TIMES DAILY
Qty: 10 ML | Refills: 0 | Status: SHIPPED | OUTPATIENT
Start: 2022-05-18 | End: 2022-05-25

## 2022-05-18 RX ORDER — BROMPHENIRAMINE MALEATE, PSEUDOEPHEDRINE HYDROCHLORIDE, AND DEXTROMETHORPHAN HYDROBROMIDE 2; 30; 10 MG/5ML; MG/5ML; MG/5ML
5 SYRUP ORAL 4 TIMES DAILY PRN
Qty: 200 ML | Refills: 0 | Status: SHIPPED | OUTPATIENT
Start: 2022-05-18 | End: 2022-07-18

## 2022-05-18 NOTE — PROGRESS NOTES
Subjective   Chencho Bah is a 42 y.o. female.   You have chosen to receive care through a telehealth visit.  Do you consent to use a video/audio connection for your medical care today? Yes    History of Present Illness   Pt presents with CC of cough, L ear pain, chest congestion for the last few days  No fever. Some chills.   No known sick contacts   No loss of taste or smell.   Has been trying decongestant and ear drops. Having some drainage from ear. No blood.    Car broke down. Can not get in office     The following portions of the patient's history were reviewed and updated as appropriate: allergies, current medications, past family history, past medical history, past social history, past surgical history and problem list.    Review of Systems   Constitutional: Negative for fever.   HENT: Positive for congestion, ear discharge and ear pain. Negative for postnasal drip, sinus pressure and sore throat.    Respiratory: Positive for cough. Negative for shortness of breath and wheezing.    Cardiovascular: Negative for chest pain.       Objective    not currently breastfeeding.     Physical Exam  Constitutional:       Appearance: Normal appearance.   Pulmonary:      Effort: Pulmonary effort is normal. No respiratory distress.   Neurological:      Mental Status: She is alert and oriented to person, place, and time.   Psychiatric:         Mood and Affect: Mood normal.         Behavior: Behavior normal.         Assessment & Plan   Diagnoses and all orders for this visit:    1. Acute URI (Primary)  -     cefdinir (OMNICEF) 300 MG capsule; Take 1 capsule by mouth 2 (Two) Times a Day.  Dispense: 14 capsule; Refill: 0    2. Otalgia, left ear  -     neomycin-polymyxin-hydrocortisone (CORTISPORIN) 3.5-20276-2 otic suspension; Administer 3 drops into the left ear 4 (Four) Times a Day for 7 days.  Dispense: 10 mL; Refill: 0  -     cefdinir (OMNICEF) 300 MG capsule; Take 1 capsule by mouth 2 (Two) Times a Day.   Dispense: 14 capsule; Refill: 0    3. Cough  -     brompheniramine-pseudoephedrine-DM 30-2-10 MG/5ML syrup; Take 5 mL by mouth 4 (Four) Times a Day As Needed for Congestion.  Dispense: 200 mL; Refill: 0    treatment as outlined in plan. F.u INB     This visit has been rescheduled as a video visit to comply with patient safety concerns in accordance with CDC recommendations. Total time of discussion was 5 minutes.

## 2022-05-24 DIAGNOSIS — E78.2 MIXED HYPERLIPIDEMIA: Primary | ICD-10-CM

## 2022-05-24 RX ORDER — ATORVASTATIN CALCIUM 10 MG/1
10 TABLET, FILM COATED ORAL DAILY
Qty: 90 TABLET | Refills: 0 | Status: SHIPPED | OUTPATIENT
Start: 2022-05-24 | End: 2022-07-18

## 2022-05-26 ENCOUNTER — TELEMEDICINE (OUTPATIENT)
Dept: BEHAVIORAL HEALTH | Facility: CLINIC | Age: 42
End: 2022-05-26

## 2022-05-26 ENCOUNTER — TELEPHONE (OUTPATIENT)
Dept: FAMILY MEDICINE CLINIC | Facility: CLINIC | Age: 42
End: 2022-05-26

## 2022-05-26 DIAGNOSIS — F43.23 ADJUSTMENT DISORDER WITH MIXED ANXIETY AND DEPRESSED MOOD: Primary | ICD-10-CM

## 2022-05-26 DIAGNOSIS — F43.10 POST TRAUMATIC STRESS DISORDER (PTSD): ICD-10-CM

## 2022-05-26 DIAGNOSIS — F41.9 ANXIETY: Primary | ICD-10-CM

## 2022-05-26 PROCEDURE — 90834 PSYTX W PT 45 MINUTES: CPT | Performed by: SOCIAL WORKER

## 2022-05-26 RX ORDER — FLUOXETINE HYDROCHLORIDE 20 MG/1
20 CAPSULE ORAL DAILY
Qty: 30 CAPSULE | Refills: 0 | Status: SHIPPED | OUTPATIENT
Start: 2022-05-26 | End: 2022-06-02

## 2022-05-26 NOTE — PROGRESS NOTES
[facility]                Telehealth (Video) Visit     Telehealth  (Video) Visit      Patient Name: Chencho Bah  : 1980   MRN: [id number]   Time In: 2:50 PM      Time Out: 3:35 PM    Referring Physician: Savage Orozco PA    Chief Complaint:      ICD-10-CM ICD-9-CM   1. Adjustment disorder with mixed anxiety and depressed mood  F43.23 309.28   2. Post traumatic stress disorder (PTSD)  F43.10 309.81        This provider is located [address] [facility] [address]. This visit is being done on behalf of [facility] using a Social[employer] platform for a video visit in a secure and private environment. The Patient is seen remotely at their home address in [place], using a private computer, phone or tablet.  The patient is able to be seen through a MyChart Video Visit through [employer] at today's encounter. Patient is being evaluated/treated via telehealth by Zoom, and stated they are in a secure environment for this session. The patient's condition being diagnosed/treated is appropriate for telemedicine. The provider identified herself as well as her credentials.   The patient, and/or patient's guardian, consent to being seen remotely, and when consent is given they understand that the consent allows for patient identifiable information to be sent to a third party as needed.   They may refuse to be seen remotely at any time. The electronic data is encrypted and password protected, and the patient and/or guardian has been advised of the potential risks to privacy not withstanding such measures.    You have chosen to receive care through a video visit today. Do you consent to use a video visit for your medical care today? Patient requested video visit on this day.   This visit has been scheduled as a video visit to comply with patient safety concerns in accordance with CDC recommendations.    History of  Present Illness: Chencho Bah is a 42 y.o. female who I saw today via video visit for individual psychotherapy session.        Subjective      Review of Systems:   The following portions of the patient's history were reviewed and updated as appropriate: allergies, current medications, past family history, past medical history, past social history, past surgical history and problem list.     Interval History:   No Change    Medications:     Current Outpatient Medications:   •  amLODIPine (NORVASC) 5 MG tablet, Take 1 tablet by mouth Daily., Disp: 90 tablet, Rfl: 3  •  atorvastatin (LIPITOR) 10 MG tablet, Take 1 tablet by mouth Daily., Disp: 90 tablet, Rfl: 0  •  B-12 Methylcobalamin 1000 MCG tablet dispersible, Place 1,000 mg on the tongue Daily., Disp: 90 tablet, Rfl: 3  •  brompheniramine-pseudoephedrine-DM 30-2-10 MG/5ML syrup, Take 5 mL by mouth 4 (Four) Times a Day As Needed for Congestion., Disp: 200 mL, Rfl: 0  •  busPIRone (BUSPAR) 15 MG tablet, Take 1 tablet by mouth 2 (Two) Times a Day., Disp: 60 tablet, Rfl: 2  •  cefdinir (OMNICEF) 300 MG capsule, Take 1 capsule by mouth 2 (Two) Times a Day., Disp: 14 capsule, Rfl: 0  •  chlorhexidine (HIBICLENS) 4 % external liquid, Apply  topically to the appropriate area as directed Daily As Needed for Wound Care., Disp: 236 mL, Rfl: 2  •  clindamycin (CLEOCIN T) 1 % lotion, Apply  topically to the appropriate area as directed 2 (Two) Times a Day., Disp: 60 mL, Rfl: 5  •  cloNIDine (CATAPRES) 0.1 MG tablet, Take 1 tablet by mouth 3 (Three) Times a Day As Needed for High Blood Pressure., Disp: 90 tablet, Rfl: 3  •  Creon 16845-572255 units capsule delayed-release particles capsule, TAKE 1 CAPSULE BY MOUTH TWICE A DAY WITH MEALS, Disp: 180 capsule, Rfl: 3  •  cyclobenzaprine (FLEXERIL) 10 MG tablet, Take 1 tablet by mouth 3 (Three) Times a Day As Needed for Muscle Spasms., Disp: 270 tablet, Rfl: 3  •  dexlansoprazole (Dexilant) 60 MG capsule, Take 1 capsule by  mouth Daily., Disp: 90 capsule, Rfl: 3  •  dicyclomine (BENTYL) 20 MG tablet, Take 1 tablet by mouth 4 (Four) Times a Day., Disp: 120 tablet, Rfl: 5  •  diphenoxylate-atropine (Lomotil) 2.5-0.025 MG per tablet, Take 1 tablet by mouth 4 (Four) Times a Day As Needed for Diarrhea., Disp: 20 tablet, Rfl: 0  •  ergocalciferol (ERGOCALCIFEROL) 1.25 MG (25724 UT) capsule, Take 1 capsule by mouth 1 (One) Time Per Week., Disp: 5 capsule, Rfl: 11  •  hydrOXYzine pamoate (VISTARIL) 100 MG capsule, Take 1 capsule by mouth 3 (Three) Times a Day As Needed for Itching., Disp: 90 capsule, Rfl: 1  •  ibuprofen (ADVIL,MOTRIN) 800 MG tablet, Take 1 tablet by mouth Every 8 (Eight) Hours As Needed for Mild Pain ., Disp: 90 tablet, Rfl: 0  •  imipramine (TOFRANIL) 25 MG tablet, Take 1 tablet by mouth Daily., Disp: 90 tablet, Rfl: 3  •  Multiple Vitamin (THERA-TABS) tablet, Take 1 tablet by mouth Every Morning., Disp: , Rfl: 0  •  ondansetron (ZOFRAN) 4 MG tablet, Take 1 tablet by mouth See Admin Instructions. Take 1 tablet by mouth every 6-8 hours as needed, Disp: 30 tablet, Rfl: 5  •  PREMARIN 0.625 MG/GM vaginal cream, , Disp: , Rfl:   •  promethazine (PHENERGAN) 25 MG tablet, Take 1 tablet by mouth Every 6 (Six) Hours As Needed for Nausea or Vomiting., Disp: 30 tablet, Rfl: 5  •  terconazole (Terazol 7) 0.4 % vaginal cream, Insert 1 applicator into the vagina Every Night., Disp: 1 each, Rfl: 1    PHQ-9 Depression Screening  PHQ-9 Score: 22    Objective     Physical Exam:  Vital Signs:   Due to extenuating circumstances and possible current health risks associated with the patient being present in a clinical setting (with current health restrictions in place in regards to possible COVID 19 transmission/exposure), the patient was seen remotely today via a video visit. Unable to obtain vital signs due to nature of remote visit.  Height: 167.6 cm inches.  Weight: 202 pounds.     Mental Status Exam:   Hygiene:   good  Cooperation:   Cooperative  Eye Contact:  Good  Psychomotor Behavior:  Appropriate  Affect:  Tearful  Mood: depressed  Speech:  Normal  Thought Process:  Linear  Thought Content:  Normal  Suicidal:  Suicidal Ideation  Homicidal:  None  Hallucinations:  None  Delusion:  None  Memory:  Intact  Orientation:  Person, Place, Time and Situation  Reliability:  good  Insight:  Good  Judgement:  Good  Impulse Control:  Good  Physical/Medical Issues:  Patient reported pnemonia and ear infection     Assessment / Plan      Assessment/Plan:   Diagnoses and all orders for this visit:    1. Adjustment disorder with mixed anxiety and depressed mood (Primary)    2. Post traumatic stress disorder (PTSD)    Patient met with the undersigned on this day via video visit for individual psychotherapy session. She affirmed taking medication as prescribed and feeling as if medication had been unhelpful for management of depression and anxiety symptoms recently. Therapist allowed patient to process feelings of bereavement as well as daily life stressors related to strained interpersonal relationships with her children. Therapist and patient discussed the importance of rest, self-care, and attending to physical health as well as maintaining healthy interpersonal boundaries with patient's children. Patient affirmed suicidal ideation recently. She denied specific plan and intention to end her life. She denied any current suicidal ideation. Patient further denied any homicidal ideation or auditory/visual hallucinations; oriented to person, place, time, and situation. Therapist encouraged patient to reach out to primary care provider for medication management. Patient expressed intention to do so. She will continue weekly outpatient psychotherapy.     TREATMENT PLAN/GOALS: Continue supportive psychotherapy efforts and medications as indicated. Treatment and medication options discussed during today's visit. Patient ackowledged and verbally consented to continue  with current treatment plan and was educated on the importance of compliance with treatment and follow-up appointments. Patient seems reasonably able to adhere to treatment plan.      Allowed Patient to freely discuss issues  without interruption or judgement with unconditional positive regard, active listening skills, and empathy. Therapist provided a safe, confidential environment to facilitate the development of a positive therapeutic relationship and encouraged open, honest communication. Assisted Patient in identifying risk factors which would indicate the need for higher level of care including thoughts to harm self or others and/or self-harming behavior and encouraged Patient to contact this office, call 911, or present to the nearest emergency room should any of these events occur. Discussed crisis intervention services and means to access. Patient adamantly and convincingly denies current suicidal or homicidal ideation or perceptual disturbance. Assisted Patient in processing session content; acknowledged and normalized Patient’s thoughts, feelings, and concerns by utilizing a person-centered approach in efforts to build appropriate rapport and a positive therapeutic relationship with open and honest communication.     Quality Measures:     TOBACCO USE:  Current every day smoker    Follow Up:   Return in about 1 week (around 6/2/2022) for Psychotherapy Follow-Up.    Cristy Cartagena LCSW

## 2022-05-26 NOTE — TELEPHONE ENCOUNTER
PATIENT JUST HAD A VISIT WITH BRAYDEN AND BRAYDEN TOLD HER TO LET GRIS KNOW THAT THESE TWO MEDS WHERE NOT HELPING HER.  SHE SAID PETE HAD PUT HER ON THE HYDROXYZINE PAMOATE 100MG.  SHE SAYS SHE NEEDS SOMETHING DIFFERENT NEITHER IS HELPING HER.    BUSPIRONE 15MG TABLET   HYDROXYZINE PAMOATE 100MG

## 2022-05-27 NOTE — TELEPHONE ENCOUNTER
Pt has already tried prozac and she stated that it does not help her. She wanted to know if something else could be sent in for her. Please advise        Advised that ramona is not in office today

## 2022-05-31 NOTE — TELEPHONE ENCOUNTER
Unfortunately, pt will need to await Anam to be back in office for additional recommendations where she has tried and failed alternatives. OLEG

## 2022-06-02 ENCOUNTER — OFFICE VISIT (OUTPATIENT)
Dept: BEHAVIORAL HEALTH | Facility: CLINIC | Age: 42
End: 2022-06-02

## 2022-06-02 DIAGNOSIS — F43.10 POST TRAUMATIC STRESS DISORDER (PTSD): ICD-10-CM

## 2022-06-02 DIAGNOSIS — F41.8 DEPRESSION WITH ANXIETY: Primary | ICD-10-CM

## 2022-06-02 DIAGNOSIS — F43.23 ADJUSTMENT DISORDER WITH MIXED ANXIETY AND DEPRESSED MOOD: Primary | ICD-10-CM

## 2022-06-02 PROCEDURE — 90832 PSYTX W PT 30 MINUTES: CPT | Performed by: SOCIAL WORKER

## 2022-06-02 RX ORDER — VORTIOXETINE 10 MG/1
1 TABLET, FILM COATED ORAL DAILY
Qty: 30 TABLET | Refills: 1 | Status: SHIPPED | OUTPATIENT
Start: 2022-06-02 | End: 2022-06-22

## 2022-06-02 NOTE — PROGRESS NOTES
[facility]                  Follow Up Adult      Follow Up Adult Note     Date:2022   Patient Name: Chencho Bah  : 1980   MRN: [id number]   Time IN: 2:45 PM    Time OUT: 3:15 PM     Referring Provider: Savage Orozco PA    Chief Complaint:      ICD-10-CM ICD-9-CM   1. Adjustment disorder with mixed anxiety and depressed mood  F43.23 309.28   2. Post traumatic stress disorder (PTSD)  F43.10 309.81        History of Present Illness:   Chencho Bah is a 42 y.o. female who is being seen today for follow up individual Psychotherapy session.       Subjective     Assessment Scores:   PHQ-9 Total Score: PHQ-9 Total Score:     GISELA-7 Score:      Patient's Support Network Includes:  coworkers    Functional Status: Moderate impairment     Progress toward goal: Not at goal    Prognosis: Good with Ongoing Treatment     Medications:     Current Outpatient Medications:   •  amLODIPine (NORVASC) 5 MG tablet, Take 1 tablet by mouth Daily., Disp: 90 tablet, Rfl: 3  •  atorvastatin (LIPITOR) 10 MG tablet, Take 1 tablet by mouth Daily., Disp: 90 tablet, Rfl: 0  •  B-12 Methylcobalamin 1000 MCG tablet dispersible, Place 1,000 mg on the tongue Daily., Disp: 90 tablet, Rfl: 3  •  brompheniramine-pseudoephedrine-DM 30-2-10 MG/5ML syrup, Take 5 mL by mouth 4 (Four) Times a Day As Needed for Congestion., Disp: 200 mL, Rfl: 0  •  busPIRone (BUSPAR) 15 MG tablet, Take 1 tablet by mouth 2 (Two) Times a Day., Disp: 60 tablet, Rfl: 2  •  cefdinir (OMNICEF) 300 MG capsule, Take 1 capsule by mouth 2 (Two) Times a Day., Disp: 14 capsule, Rfl: 0  •  chlorhexidine (HIBICLENS) 4 % external liquid, Apply  topically to the appropriate area as directed Daily As Needed for Wound Care., Disp: 236 mL, Rfl: 2  •  clindamycin (CLEOCIN T) 1 % lotion, Apply  topically to the appropriate area as directed 2 (Two) Times a Day., Disp: 60 mL, Rfl: 5  •  cloNIDine (CATAPRES) 0.1 MG  tablet, Take 1 tablet by mouth 3 (Three) Times a Day As Needed for High Blood Pressure., Disp: 90 tablet, Rfl: 3  •  Creon 47435-454413 units capsule delayed-release particles capsule, TAKE 1 CAPSULE BY MOUTH TWICE A DAY WITH MEALS, Disp: 180 capsule, Rfl: 3  •  cyclobenzaprine (FLEXERIL) 10 MG tablet, Take 1 tablet by mouth 3 (Three) Times a Day As Needed for Muscle Spasms., Disp: 270 tablet, Rfl: 3  •  dexlansoprazole (Dexilant) 60 MG capsule, Take 1 capsule by mouth Daily., Disp: 90 capsule, Rfl: 3  •  dicyclomine (BENTYL) 20 MG tablet, Take 1 tablet by mouth 4 (Four) Times a Day., Disp: 120 tablet, Rfl: 5  •  diphenoxylate-atropine (Lomotil) 2.5-0.025 MG per tablet, Take 1 tablet by mouth 4 (Four) Times a Day As Needed for Diarrhea., Disp: 20 tablet, Rfl: 0  •  ergocalciferol (ERGOCALCIFEROL) 1.25 MG (46620 UT) capsule, Take 1 capsule by mouth 1 (One) Time Per Week., Disp: 5 capsule, Rfl: 11  •  FLUoxetine (PROzac) 20 MG capsule, Take 1 capsule by mouth Daily., Disp: 30 capsule, Rfl: 0  •  hydrOXYzine pamoate (VISTARIL) 100 MG capsule, Take 1 capsule by mouth 3 (Three) Times a Day As Needed for Itching., Disp: 90 capsule, Rfl: 1  •  ibuprofen (ADVIL,MOTRIN) 800 MG tablet, Take 1 tablet by mouth Every 8 (Eight) Hours As Needed for Mild Pain ., Disp: 90 tablet, Rfl: 0  •  imipramine (TOFRANIL) 25 MG tablet, Take 1 tablet by mouth Daily., Disp: 90 tablet, Rfl: 3  •  Multiple Vitamin (THERA-TABS) tablet, Take 1 tablet by mouth Every Morning., Disp: , Rfl: 0  •  ondansetron (ZOFRAN) 4 MG tablet, Take 1 tablet by mouth See Admin Instructions. Take 1 tablet by mouth every 6-8 hours as needed, Disp: 30 tablet, Rfl: 5  •  PREMARIN 0.625 MG/GM vaginal cream, , Disp: , Rfl:   •  promethazine (PHENERGAN) 25 MG tablet, Take 1 tablet by mouth Every 6 (Six) Hours As Needed for Nausea or Vomiting., Disp: 30 tablet, Rfl: 5  •  terconazole (Terazol 7) 0.4 % vaginal cream, Insert 1 applicator into the vagina Every Night., Disp: 1  each, Rfl: 1    Allergies:   No Known Allergies    Objective     Mental Status Exam:   Hygiene:   good  Cooperation:  Cooperative  Eye Contact:  Downcast  Psychomotor Behavior:  Restless  Affect:  Blunted  Mood: depressed, anxious  Speech:  Normal  Thought Process:  Linear  Thought Content:  Normal  Suicidal:  None  Homicidal:  None  Hallucinations:  None  Delusion:  None  Memory:  Intact  Orientation:  Person, Place, Time and Situation  Reliability:  good  Insight:  Good  Judgement:  Good  Impulse Control:  Good  Physical/Medical Issues:  Patient reported ear infection in left ear as well as itching     Assessment / Plan      Visit Diagnosis/Orders Placed This Visit:    ICD-10-CM ICD-9-CM   1. Adjustment disorder with mixed anxiety and depressed mood  F43.23 309.28   2. Post traumatic stress disorder (PTSD)  F43.10 309.81          PLAN:  1. Safety: No acute safety concerns  2. Risk Assessment: Risk of self-harm acutely is low. Risk of self-harm chronically is also low, but could be further elevated in the event of treatment noncompliance and/or AODA.    Patient met with the undersigned on this day in office for individual psychotherapy session. She affirmed taking medication as prescribed and feeling as if medication had been unhelpful for management of depression and anxiety symptoms as well as insomnia recently. Therapist allowed patient to process bereavement and daily life stressors in session and offered support and validation of patient's emotional experience. Therapist and patient discussed mindfulness-based deep breathing exercises as well as Cognitive-Behavioral Therapy (CBT) concepts including challenging negative thoughts for emotional regulation. Therapist and patient further reviewed the importance of attending to physical health, self-care, and reaching out for support from patient's community to manage bereavement. Patient denied any current suicidal or homicidal ideation. She further denied any  death wishes or experiencing auditory/visual hallucinations; oriented to person, place, time, and situation. She will continue weekly outpatient psychotherapy.     Treatment Plan/Goals: Continue supportive psychotherapy efforts and medications as indicated. Treatment and medication options discussed during today's visit. Patient ackowledged and verbally consented to continue with current treatment plan and was educated on the importance of compliance with treatment and follow-up appointments. Patient seems reasonably able to adhere to treatment plan.      Assisted Patient in processing above session content; acknowledged and normalized patient’s thoughts, feelings, and concerns.  Rationalized patient thought process regarding bereavement and daily life stressors.      Allowed Patient to freely discuss issues  without interruption or judgement with unconditional positive regard, active listening skills, and empathy. Therapist provided a safe, confidential environment to facilitate the development of a positive therapeutic relationship and encouraged open, honest communication. Assisted Patient in identifying risk factors which would indicate the need for higher level of care including thoughts to harm self or others and/or self-harming behavior and encouraged Patient to contact this office, call 911, or present to the nearest emergency room should any of these events occur. Discussed crisis intervention services and means to access. Patient adamantly and convincingly denies current suicidal or homicidal ideation or perceptual disturbance. Assisted Patient in processing session content; acknowledged and normalized Patient’s thoughts, feelings, and concerns by utilizing a person-centered approach in efforts to build appropriate rapport and a positive therapeutic relationship with open and honest communication.      Quality Measures:     TOBACCO USE:  Current every day smoker    Follow Up:   Return in about 1 week  (around 6/9/2022) for Psychotherapy Follow-Up.      Cristy Cartagena LCSW

## 2022-06-03 ENCOUNTER — TELEPHONE (OUTPATIENT)
Dept: FAMILY MEDICINE CLINIC | Facility: CLINIC | Age: 42
End: 2022-06-03

## 2022-06-06 NOTE — TELEPHONE ENCOUNTER
Message was sent last week while I was out of the office. She will need to seek UC evaluation if still having urinary symptoms. Please remember that messages marked as high priority need to be sent to an available provider if we are out of the office on our days off.  OLEG

## 2022-06-06 NOTE — TELEPHONE ENCOUNTER
Informed pt and she was okay with this but more concerned about her medication question from the week prior.

## 2022-06-16 ENCOUNTER — OFFICE VISIT (OUTPATIENT)
Dept: BEHAVIORAL HEALTH | Facility: CLINIC | Age: 42
End: 2022-06-16

## 2022-06-16 DIAGNOSIS — F43.23 ADJUSTMENT DISORDER WITH MIXED ANXIETY AND DEPRESSED MOOD: Primary | ICD-10-CM

## 2022-06-16 DIAGNOSIS — F43.10 POST TRAUMATIC STRESS DISORDER (PTSD): ICD-10-CM

## 2022-06-16 PROCEDURE — 90832 PSYTX W PT 30 MINUTES: CPT | Performed by: SOCIAL WORKER

## 2022-06-16 NOTE — PROGRESS NOTES
[facility]                  Follow Up Adult      Follow Up Adult Note     Date:2022   Patient Name: Chencho Bah  : 1980   MRN: [id number]   Time IN: 2:10 PM    Time OUT: 2:40 PM     Referring Provider: Saavge Orozco PA    Chief Complaint:      ICD-10-CM ICD-9-CM   1. Adjustment disorder with mixed anxiety and depressed mood  F43.23 309.28   2. Post traumatic stress disorder (PTSD)  F43.10 309.81        History of Present Illness:   Chencho Bah is a 42 y.o. female who is being seen today for follow up individual Psychotherapy session.       Subjective     Assessment Scores:   PHQ-9 Total Score: PHQ-9 Total Score: 23   GISELA-7 Score: 11    Patient's Support Network Includes:  Son and workers    Functional Status: Moderate impairment     Progress toward goal: Not at goal    Prognosis: Good with Ongoing Treatment     Medications:     Current Outpatient Medications:   •  amLODIPine (NORVASC) 5 MG tablet, Take 1 tablet by mouth Daily., Disp: 90 tablet, Rfl: 3  •  atorvastatin (LIPITOR) 10 MG tablet, Take 1 tablet by mouth Daily., Disp: 90 tablet, Rfl: 0  •  B-12 Methylcobalamin 1000 MCG tablet dispersible, Place 1,000 mg on the tongue Daily., Disp: 90 tablet, Rfl: 3  •  brompheniramine-pseudoephedrine-DM 30-2-10 MG/5ML syrup, Take 5 mL by mouth 4 (Four) Times a Day As Needed for Congestion., Disp: 200 mL, Rfl: 0  •  busPIRone (BUSPAR) 15 MG tablet, Take 1 tablet by mouth 2 (Two) Times a Day., Disp: 60 tablet, Rfl: 2  •  cefdinir (OMNICEF) 300 MG capsule, Take 1 capsule by mouth 2 (Two) Times a Day., Disp: 14 capsule, Rfl: 0  •  chlorhexidine (HIBICLENS) 4 % external liquid, Apply  topically to the appropriate area as directed Daily As Needed for Wound Care., Disp: 236 mL, Rfl: 2  •  clindamycin (CLEOCIN T) 1 % lotion, Apply  topically to the appropriate area as directed 2 (Two) Times a Day., Disp: 60 mL, Rfl: 5  •  cloNIDine (CATAPRES)  0.1 MG tablet, Take 1 tablet by mouth 3 (Three) Times a Day As Needed for High Blood Pressure., Disp: 90 tablet, Rfl: 3  •  Creon 80940-731021 units capsule delayed-release particles capsule, TAKE 1 CAPSULE BY MOUTH TWICE A DAY WITH MEALS, Disp: 180 capsule, Rfl: 3  •  cyclobenzaprine (FLEXERIL) 10 MG tablet, Take 1 tablet by mouth 3 (Three) Times a Day As Needed for Muscle Spasms., Disp: 270 tablet, Rfl: 3  •  dexlansoprazole (Dexilant) 60 MG capsule, Take 1 capsule by mouth Daily., Disp: 90 capsule, Rfl: 3  •  dicyclomine (BENTYL) 20 MG tablet, Take 1 tablet by mouth 4 (Four) Times a Day., Disp: 120 tablet, Rfl: 5  •  diphenoxylate-atropine (Lomotil) 2.5-0.025 MG per tablet, Take 1 tablet by mouth 4 (Four) Times a Day As Needed for Diarrhea., Disp: 20 tablet, Rfl: 0  •  ergocalciferol (ERGOCALCIFEROL) 1.25 MG (77849 UT) capsule, Take 1 capsule by mouth 1 (One) Time Per Week., Disp: 5 capsule, Rfl: 11  •  hydrOXYzine pamoate (VISTARIL) 100 MG capsule, Take 1 capsule by mouth 3 (Three) Times a Day As Needed for Itching., Disp: 90 capsule, Rfl: 1  •  ibuprofen (ADVIL,MOTRIN) 800 MG tablet, Take 1 tablet by mouth Every 8 (Eight) Hours As Needed for Mild Pain ., Disp: 90 tablet, Rfl: 0  •  imipramine (TOFRANIL) 25 MG tablet, Take 1 tablet by mouth Daily., Disp: 90 tablet, Rfl: 3  •  Multiple Vitamin (THERA-TABS) tablet, Take 1 tablet by mouth Every Morning., Disp: , Rfl: 0  •  ondansetron (ZOFRAN) 4 MG tablet, Take 1 tablet by mouth See Admin Instructions. Take 1 tablet by mouth every 6-8 hours as needed, Disp: 30 tablet, Rfl: 5  •  PREMARIN 0.625 MG/GM vaginal cream, , Disp: , Rfl:   •  promethazine (PHENERGAN) 25 MG tablet, Take 1 tablet by mouth Every 6 (Six) Hours As Needed for Nausea or Vomiting., Disp: 30 tablet, Rfl: 5  •  terconazole (Terazol 7) 0.4 % vaginal cream, Insert 1 applicator into the vagina Every Night., Disp: 1 each, Rfl: 1  •  Vortioxetine HBr (Trintellix) 10 MG tablet, Take 10 mg by mouth Daily.,  Disp: 30 tablet, Rfl: 1    Allergies:   No Known Allergies    Objective     Mental Status Exam:   Hygiene:   good  Cooperation:  Cooperative  Eye Contact:  Downcast  Psychomotor Behavior:  Appropriate  Affect:  tearful  Mood: depressed  Speech:  Normal  Thought Process:  Linear  Thought Content:  Normal  Suicidal:  None  Homicidal:  None  Hallucinations:  None  Delusion:  None  Memory:  Intact  Orientation:  Person, Place, Time and Situation  Reliability:  good  Insight:  Good  Judgement:  Good  Impulse Control:  Good  Physical/Medical Issues:  None reported at this time     Assessment / Plan      Visit Diagnosis/Orders Placed This Visit:    ICD-10-CM ICD-9-CM   1. Adjustment disorder with mixed anxiety and depressed mood  F43.23 309.28   2. Post traumatic stress disorder (PTSD)  F43.10 309.81          PLAN:  1. Safety: No acute safety concerns  2. Risk Assessment: Risk of self-harm acutely is low. Risk of self-harm chronically is also low, but could be further elevated in the event of treatment noncompliance and/or AODA.    Patient met with the undersigned on this day in office for individual psychotherapy session. She reported taking medication as prescribed and feeling as if medication had been unhelpful for management of depression and anxiety symptoms. She expressed intention to attend medication management appointment with psychiatric APRN next week. Thearpist allowed patient to process the impact of bereavement upon current thoughts, feelings, and behavior in session and offered support and validation of patient's emotional experience. Therapist and patient discussed stages of grief as well as the importance of maintaining healthy interpersonal boundaries with family members at this time. Patient denied any current suicidal or homicidal ideation. She further denied any death wishes or auditory/visual hallucinations; oriented to person, place, time, and situation. She will continue bi-weekly outpatient  psychotherapy.     Treatment Plan/Goals: Continue supportive psychotherapy efforts and medications as indicated. Treatment and medication options discussed during today's visit. Patient ackowledged and verbally consented to continue with current treatment plan and was educated on the importance of compliance with treatment and follow-up appointments. Patient seems reasonably able to adhere to treatment plan.      Assisted Patient in processing above session content; acknowledged and normalized patient’s thoughts, feelings, and concerns.  Rationalized patient thought process regarding symptoms and bereavement.      Allowed Patient to freely discuss issues  without interruption or judgement with unconditional positive regard, active listening skills, and empathy. Therapist provided a safe, confidential environment to facilitate the development of a positive therapeutic relationship and encouraged open, honest communication. Assisted Patient in identifying risk factors which would indicate the need for higher level of care including thoughts to harm self or others and/or self-harming behavior and encouraged Patient to contact this office, call 911, or present to the nearest emergency room should any of these events occur. Discussed crisis intervention services and means to access. Patient adamantly and convincingly denies current suicidal or homicidal ideation or perceptual disturbance. Assisted Patient in processing session content; acknowledged and normalized Patient’s thoughts, feelings, and concerns by utilizing a person-centered approach in efforts to build appropriate rapport and a positive therapeutic relationship with open and honest communication.     Quality Measures:     TOBACCO USE:  Smoker, current status unknown    Follow Up:   Return in about 2 weeks (around 6/30/2022) for Psychotherapy Follow-Up.      Cristy Cartagena LCSW

## 2022-06-18 DIAGNOSIS — R19.7 DIARRHEA, UNSPECIFIED TYPE: ICD-10-CM

## 2022-06-18 DIAGNOSIS — K86.1 IDIOPATHIC CHRONIC PANCREATITIS: ICD-10-CM

## 2022-06-18 DIAGNOSIS — M79.7 FIBROMYALGIA: ICD-10-CM

## 2022-06-20 RX ORDER — IBUPROFEN 800 MG/1
800 TABLET ORAL EVERY 8 HOURS PRN
Qty: 90 TABLET | Refills: 0 | Status: SHIPPED | OUTPATIENT
Start: 2022-06-20 | End: 2022-07-19

## 2022-06-21 RX ORDER — DIPHENOXYLATE HYDROCHLORIDE AND ATROPINE SULFATE 2.5; .025 MG/1; MG/1
TABLET ORAL
Qty: 20 TABLET | Refills: 0 | OUTPATIENT
Start: 2022-06-21

## 2022-06-22 ENCOUNTER — TELEPHONE (OUTPATIENT)
Dept: FAMILY MEDICINE CLINIC | Facility: CLINIC | Age: 42
End: 2022-06-22

## 2022-06-22 ENCOUNTER — TELEMEDICINE (OUTPATIENT)
Dept: BEHAVIORAL HEALTH | Facility: CLINIC | Age: 42
End: 2022-06-22

## 2022-06-22 VITALS — HEIGHT: 66 IN | WEIGHT: 202 LBS | BODY MASS INDEX: 32.47 KG/M2

## 2022-06-22 DIAGNOSIS — R19.7 DIARRHEA, UNSPECIFIED TYPE: ICD-10-CM

## 2022-06-22 DIAGNOSIS — F43.21 GRIEF: Primary | ICD-10-CM

## 2022-06-22 DIAGNOSIS — F43.11 ACUTE POSTTRAUMATIC STRESS DISORDER: ICD-10-CM

## 2022-06-22 PROCEDURE — 99214 OFFICE O/P EST MOD 30 MIN: CPT

## 2022-06-22 RX ORDER — LAMOTRIGINE 25 MG/1
TABLET ORAL
Qty: 42 TABLET | Refills: 0 | Status: SHIPPED | OUTPATIENT
Start: 2022-06-22 | End: 2022-07-13

## 2022-06-22 NOTE — PATIENT INSTRUCTIONS
Start Lamictal 25mg daily for two weeks.   Then double to 50mg (two pills) for two weeks.     Take in the morning.   If medication makes you sleepy, then take at night.

## 2022-06-22 NOTE — PROGRESS NOTES
"     Video Visit      Patient Name: Chencho Bah  : 1980   MRN: [id number]     Referring Physician: Savage Orozco PA    Chief Complaint:      ICD-10-CM ICD-9-CM   1. Grief  F43.21 309.0   2. Acute posttraumatic stress disorder  F43.11 309.81        This provider is located at [facility], using a secure [employer] Video Visit through [employer]. Chencho Bah is being seen remotely via telehealth at their home address in [place], and stated they are in a secure environment for this session. The patient's condition being diagnosed/treated is appropriate for telemedicine. I TEJA Andujar identified myself as well as my credentials.   The patient, and/or patients guardian, consent to be seen remotely, and when consent is given they understand that the consent allows for patient identifiable information to be sent to a third party as needed.   They may refuse to be seen remotely at any time. The electronic data is encrypted and password protected, and the patient and/or guardian has been advised of the potential risks to privacy not withstanding such measures.     You have chosen to receive care through a telehealth visit.  Do you consent to use a video/audio connection for your medical care today? yes  This visit complies with patient safety concerns in accordance with CDC recommendations.    History of Present Illness: Chencho Bah is a 42 y.o. female who I spoke with on video visit today for medication check related to grief and anxiety symptomology.  Patient states that things are about the same and not getting any better.  She states that \"I cry all the time I am anxious all the time.\"  She states that nothing seems to help her current medicine that she is taking is doing nothing for her.  She reports continued anxiety, agitation, and depressive symptoms.  She is still having difficult time dealing with the prior suicide of her significant other.  And " From: Lamont Monterroso  To: Ritu Hernandez MD  Sent: 7/10/2020 10:55 AM CDT  Subject: Lab Test or Test Related Question    Good morning -     Can you please provide me a status update on the approval for the neck CT with contrast?     Thanks   emotional trauma involved with that situation.  She states that she would like to be able to get off some medication but she wants to be on something that will help manage her symptoms.      Subjective     Review of Systems:   Review of Systems   Psychiatric/Behavioral: Positive for agitation, behavioral problems and sleep disturbance. The patient is nervous/anxious.        Patient History:   The following portions of the patient's history were reviewed and updated as appropriate: allergies, current medications, past family history, past medical history, past social history, past surgical history and problem list.     Social:  No change in interval history    Medications:     Current Outpatient Medications:   •  amLODIPine (NORVASC) 5 MG tablet, Take 1 tablet by mouth Daily., Disp: 90 tablet, Rfl: 3  •  atorvastatin (LIPITOR) 10 MG tablet, Take 1 tablet by mouth Daily., Disp: 90 tablet, Rfl: 0  •  B-12 Methylcobalamin 1000 MCG tablet dispersible, Place 1,000 mg on the tongue Daily., Disp: 90 tablet, Rfl: 3  •  brompheniramine-pseudoephedrine-DM 30-2-10 MG/5ML syrup, Take 5 mL by mouth 4 (Four) Times a Day As Needed for Congestion., Disp: 200 mL, Rfl: 0  •  busPIRone (BUSPAR) 15 MG tablet, Take 1 tablet by mouth 2 (Two) Times a Day., Disp: 60 tablet, Rfl: 2  •  cefdinir (OMNICEF) 300 MG capsule, Take 1 capsule by mouth 2 (Two) Times a Day., Disp: 14 capsule, Rfl: 0  •  chlorhexidine (HIBICLENS) 4 % external liquid, Apply  topically to the appropriate area as directed Daily As Needed for Wound Care., Disp: 236 mL, Rfl: 2  •  clindamycin (CLEOCIN T) 1 % lotion, Apply  topically to the appropriate area as directed 2 (Two) Times a Day., Disp: 60 mL, Rfl: 5  •  cloNIDine (CATAPRES) 0.1 MG tablet, Take 1 tablet by mouth 3 (Three) Times a Day As Needed for High Blood Pressure., Disp: 90 tablet, Rfl: 3  •  Creon 82501-552052 units capsule delayed-release particles capsule, TAKE 1 CAPSULE BY MOUTH TWICE A DAY WITH MEALS,  "Disp: 180 capsule, Rfl: 3  •  cyclobenzaprine (FLEXERIL) 10 MG tablet, Take 1 tablet by mouth 3 (Three) Times a Day As Needed for Muscle Spasms., Disp: 270 tablet, Rfl: 3  •  dexlansoprazole (Dexilant) 60 MG capsule, Take 1 capsule by mouth Daily., Disp: 90 capsule, Rfl: 3  •  dicyclomine (BENTYL) 20 MG tablet, Take 1 tablet by mouth 4 (Four) Times a Day., Disp: 120 tablet, Rfl: 5  •  diphenoxylate-atropine (Lomotil) 2.5-0.025 MG per tablet, Take 1 tablet by mouth 4 (Four) Times a Day As Needed for Diarrhea., Disp: 20 tablet, Rfl: 0  •  ergocalciferol (ERGOCALCIFEROL) 1.25 MG (29491 UT) capsule, Take 1 capsule by mouth 1 (One) Time Per Week., Disp: 5 capsule, Rfl: 11  •  hydrOXYzine pamoate (VISTARIL) 100 MG capsule, Take 1 capsule by mouth 3 (Three) Times a Day As Needed for Itching., Disp: 90 capsule, Rfl: 1  •  ibuprofen (ADVIL,MOTRIN) 800 MG tablet, TAKE 1 TABLET BY MOUTH EVERY 8 (EIGHT) HOURS AS NEEDED FOR MILD PAIN ., Disp: 90 tablet, Rfl: 0  •  imipramine (TOFRANIL) 25 MG tablet, Take 1 tablet by mouth Daily., Disp: 90 tablet, Rfl: 3  •  lamoTRIgine (LaMICtal) 25 MG tablet, Take 1 tablet by mouth Daily for 14 days, THEN 2 tablets Daily for 14 days., Disp: 42 tablet, Rfl: 0  •  Multiple Vitamin (THERA-TABS) tablet, Take 1 tablet by mouth Every Morning., Disp: , Rfl: 0  •  ondansetron (ZOFRAN) 4 MG tablet, Take 1 tablet by mouth See Admin Instructions. Take 1 tablet by mouth every 6-8 hours as needed, Disp: 30 tablet, Rfl: 5  •  PREMARIN 0.625 MG/GM vaginal cream, , Disp: , Rfl:   •  promethazine (PHENERGAN) 25 MG tablet, Take 1 tablet by mouth Every 6 (Six) Hours As Needed for Nausea or Vomiting., Disp: 30 tablet, Rfl: 5  •  terconazole (Terazol 7) 0.4 % vaginal cream, Insert 1 applicator into the vagina Every Night., Disp: 1 each, Rfl: 1    Objective     Physical Exam:  Vital Signs:   Vitals:    06/22/22 1419   Weight: 91.6 kg (202 lb)   Height: 167.6 cm (66\")     Body mass index is 32.6 kg/m².     Mental " Status Exam:   Hygiene:   good  Cooperation:  Guarded  Eye Contact:  Good  Psychomotor Behavior:  Restless  Affect:  Restricted  Mood: depressed and anxious  Speech:  Minimal  Thought Process:  Circum  Thought Content:  Normal  Suicidal:  None  Homicidal:  None  Hallucinations:  None  Delusion:  None  Memory:  Intact  Orientation:  Grossly intact  Reliability:  fair  Insight:  Poor  Judgement:  Fair  Impulse Control:  Fair  Physical/Medical Issues:  No      Assessment / Plan      Visit Diagnosis/Orders Placed This Visit:  Diagnoses and all orders for this visit:    1. Grief (Primary)    2. Acute posttraumatic stress disorder  -     lamoTRIgine (LaMICtal) 25 MG tablet; Take 1 tablet by mouth Daily for 14 days, THEN 2 tablets Daily for 14 days.  Dispense: 42 tablet; Refill: 0         Differential:   Mood disorder    Plan:   1. Initiate Lamictal at 25 mg for 2 weeks with titration to 50 mg for 2 weeks.  2. If Lamictal is effective at stabling patient's mood, we will then consider psychotropic medication titration.  3. Follow up with LCSW for continued psychotherapy.    Continue supportive psychotherapy efforts and medications as indicated. Treatment and medication options discussed during today's visit. Patient ackowledged and verbally consented to continue with current treatment plan and was educated on the importance of compliance with treatment and follow-up appointments. Patient seems reasonably able to adhere to treatment plan.      Medication Considerations:  Discussed medication options and treatment plan of prescribed medication(s) as well as the risks, benefits, and potential side effects. Patient is agreeable to call the office with any worsening of symptoms or onset of side effects. Patient is agreeable to call 911 or go to the nearest ER should he/she begin having SI/HI.    Quality Measures:   Current every day smoker less than 3 minutes spent counseling Not agreeable to stopping    I advised Chencho Royal  Olvin of the risks of tobacco use.     Follow Up:   Return in about 23 days (around 7/15/2022) for Video visit.      TEJA Baker, PMHNP-BC

## 2022-06-24 RX ORDER — DIPHENOXYLATE HYDROCHLORIDE AND ATROPINE SULFATE 2.5; .025 MG/1; MG/1
TABLET ORAL
Qty: 15 TABLET | Refills: 0 | Status: SHIPPED | OUTPATIENT
Start: 2022-06-24 | End: 2022-07-18

## 2022-06-24 NOTE — TELEPHONE ENCOUNTER
Pt stated that she is still having diarrhea and is ongoing for months and no one knows why-pt has to be able to work and needs this medication

## 2022-06-27 DIAGNOSIS — F41.9 ANXIETY: ICD-10-CM

## 2022-06-27 RX ORDER — FLUOXETINE HYDROCHLORIDE 20 MG/1
CAPSULE ORAL
Qty: 30 CAPSULE | Refills: 0 | OUTPATIENT
Start: 2022-06-27

## 2022-06-27 NOTE — TELEPHONE ENCOUNTER
This is a controlled medication. Patient will need to follow up her gastroenterology provider for additional management of diarrhea. OLEG

## 2022-07-06 ENCOUNTER — TELEMEDICINE (OUTPATIENT)
Dept: BEHAVIORAL HEALTH | Facility: CLINIC | Age: 42
End: 2022-07-06

## 2022-07-06 DIAGNOSIS — F43.23 ADJUSTMENT DISORDER WITH MIXED ANXIETY AND DEPRESSED MOOD: Primary | ICD-10-CM

## 2022-07-06 DIAGNOSIS — F43.10 POST TRAUMATIC STRESS DISORDER (PTSD): ICD-10-CM

## 2022-07-06 PROCEDURE — 90837 PSYTX W PT 60 MINUTES: CPT | Performed by: SOCIAL WORKER

## 2022-07-06 NOTE — PROGRESS NOTES
[facility]                Telehealth (Video) Visit     Telehealth  (Video) Visit      Patient Name: Chencho Bah  : 1980   MRN: [id number]   Time In: 12:00 PM      Time Out: 12:55 PM     Referring Physician: Savage Orozco PA    Chief Complaint:      ICD-10-CM ICD-9-CM   1. Adjustment disorder with mixed anxiety and depressed mood  F43.23 309.28   2. Post traumatic stress disorder (PTSD)  F43.10 309.81        This provider is located [address] [address]. This visit is being done on behalf of [facility] using a [employer] platform for a video visit in a secure and private environment. The Patient is seen remotely at their home address in [place], using a private computer, phone or tablet.  The patient is able to be seen through a MyChart Video Visit through [employer] at today's encounter. Patient is being evaluated/treated via telehealth by Zoom, and stated they are in a secure environment for this session. The patient's condition being diagnosed/treated is appropriate for telemedicine. The provider identified herself as well as her credentials.   The patient, and/or patient's guardian, consent to being seen remotely, and when consent is given they understand that the consent allows for patient identifiable information to be sent to a third party as needed.   They may refuse to be seen remotely at any time. The electronic data is encrypted and password protected, and the patient and/or guardian has been advised of the potential risks to privacy not withstanding such measures.    You have chosen to receive care through a video visit today. Do you consent to use a video visit for your medical care today? Patient requested video visit on this day.   This visit has been scheduled as a video visit to comply with patient safety concerns in accordance with CDC recommendations.    History of  Present Illness: Chencho Bah is a 42 y.o. female who I saw today via video visit for individual psychotherapy.       Subjective     Medications:     Current Outpatient Medications:   •  amLODIPine (NORVASC) 5 MG tablet, Take 1 tablet by mouth Daily., Disp: 90 tablet, Rfl: 3  •  atorvastatin (LIPITOR) 10 MG tablet, Take 1 tablet by mouth Daily., Disp: 90 tablet, Rfl: 0  •  B-12 Methylcobalamin 1000 MCG tablet dispersible, Place 1,000 mg on the tongue Daily., Disp: 90 tablet, Rfl: 3  •  brompheniramine-pseudoephedrine-DM 30-2-10 MG/5ML syrup, Take 5 mL by mouth 4 (Four) Times a Day As Needed for Congestion., Disp: 200 mL, Rfl: 0  •  busPIRone (BUSPAR) 15 MG tablet, Take 1 tablet by mouth 2 (Two) Times a Day., Disp: 60 tablet, Rfl: 2  •  cefdinir (OMNICEF) 300 MG capsule, Take 1 capsule by mouth 2 (Two) Times a Day., Disp: 14 capsule, Rfl: 0  •  chlorhexidine (HIBICLENS) 4 % external liquid, Apply  topically to the appropriate area as directed Daily As Needed for Wound Care., Disp: 236 mL, Rfl: 2  •  clindamycin (CLEOCIN T) 1 % lotion, Apply  topically to the appropriate area as directed 2 (Two) Times a Day., Disp: 60 mL, Rfl: 5  •  cloNIDine (CATAPRES) 0.1 MG tablet, Take 1 tablet by mouth 3 (Three) Times a Day As Needed for High Blood Pressure., Disp: 90 tablet, Rfl: 3  •  Creon 92196-512737 units capsule delayed-release particles capsule, TAKE 1 CAPSULE BY MOUTH TWICE A DAY WITH MEALS, Disp: 180 capsule, Rfl: 3  •  cyclobenzaprine (FLEXERIL) 10 MG tablet, Take 1 tablet by mouth 3 (Three) Times a Day As Needed for Muscle Spasms., Disp: 270 tablet, Rfl: 3  •  dexlansoprazole (Dexilant) 60 MG capsule, Take 1 capsule by mouth Daily., Disp: 90 capsule, Rfl: 3  •  dicyclomine (BENTYL) 20 MG tablet, Take 1 tablet by mouth 4 (Four) Times a Day., Disp: 120 tablet, Rfl: 5  •  diphenoxylate-atropine (LOMOTIL) 2.5-0.025 MG per tablet, TAKE 1 TABLET BY MOUTH 4 TIMES A DAY AS NEEDED FOR DIARRHEA., Disp: 15 tablet, Rfl:  0  •  ergocalciferol (ERGOCALCIFEROL) 1.25 MG (05192 UT) capsule, Take 1 capsule by mouth 1 (One) Time Per Week., Disp: 5 capsule, Rfl: 11  •  hydrOXYzine pamoate (VISTARIL) 100 MG capsule, Take 1 capsule by mouth 3 (Three) Times a Day As Needed for Itching., Disp: 90 capsule, Rfl: 1  •  ibuprofen (ADVIL,MOTRIN) 800 MG tablet, TAKE 1 TABLET BY MOUTH EVERY 8 (EIGHT) HOURS AS NEEDED FOR MILD PAIN ., Disp: 90 tablet, Rfl: 0  •  imipramine (TOFRANIL) 25 MG tablet, Take 1 tablet by mouth Daily., Disp: 90 tablet, Rfl: 3  •  lamoTRIgine (LaMICtal) 25 MG tablet, Take 1 tablet by mouth Daily for 14 days, THEN 2 tablets Daily for 14 days., Disp: 42 tablet, Rfl: 0  •  Multiple Vitamin (THERA-TABS) tablet, Take 1 tablet by mouth Every Morning., Disp: , Rfl: 0  •  ondansetron (ZOFRAN) 4 MG tablet, Take 1 tablet by mouth See Admin Instructions. Take 1 tablet by mouth every 6-8 hours as needed, Disp: 30 tablet, Rfl: 5  •  PREMARIN 0.625 MG/GM vaginal cream, , Disp: , Rfl:   •  promethazine (PHENERGAN) 25 MG tablet, Take 1 tablet by mouth Every 6 (Six) Hours As Needed for Nausea or Vomiting., Disp: 30 tablet, Rfl: 5  •  terconazole (Terazol 7) 0.4 % vaginal cream, Insert 1 applicator into the vagina Every Night., Disp: 1 each, Rfl: 1    Objective     Mental Status Exam:   Hygiene:   good  Cooperation:  Cooperative  Eye Contact:  Good  Psychomotor Behavior:  Restless  Affect:  Tearful  Mood: depressed  Speech:  Normal  Thought Process:  Linear  Thought Content:  Mood congruent  Suicidal:  None  Homicidal:  None  Hallucinations:  None  Delusion:  None  Memory:  Intact  Orientation:  Person, Place, Time and Situation  Reliability:  good  Insight:  Good  Judgement:  Good  Impulse Control:  Good  Physical/Medical Issues:  None reported at this time     Assessment / Plan      Assessment/Plan:   Diagnoses and all orders for this visit:    1. Adjustment disorder with mixed anxiety and depressed mood (Primary)    2. Post traumatic stress  disorder (PTSD)    Patient met with the undersigned on this day via video visit for individual psychotherapy session. She reported taking medication as prescribed and experiencing side effects including a rash recently. Therapist encouraged patient to discuss side effects with psychiatric APRN as soon as possible. Patient expressed intention to do so after session. Therapist allowed patient to process the impact of grief and traumatic life experiences upon current thoughts, feelings, behavior, and interpersonal relationships in session and offered support and validation of patient's emotional experience. Therapist and patient discussed the importance of practicing Cognitive Behavioral Therapy (CBT) principles including challenging negative thoughts to address guilt. Patient denied any current suicidal or homicidal ideation. She further denied any death wishes or auditory/visual hallucinations; oriented to person, place, time, and situation. Patient will continue bi-weekly outpatient psychotherapy.      TREATMENT PLAN/GOALS: Continue supportive psychotherapy efforts and medications as indicated. Treatment and medication options discussed during today's visit. Patient ackowledged and verbally consented to continue with current treatment plan and was educated on the importance of compliance with treatment and follow-up appointments. Patient seems reasonably able to adhere to treatment plan.      Allowed Patient to freely discuss issues  without interruption or judgement with unconditional positive regard, active listening skills, and empathy. Therapist provided a safe, confidential environment to facilitate the development of a positive therapeutic relationship and encouraged open, honest communication. Assisted Patient in identifying risk factors which would indicate the need for higher level of care including thoughts to harm self or others and/or self-harming behavior and encouraged Patient to contact this office,  call 911, or present to the nearest emergency room should any of these events occur. Discussed crisis intervention services and means to access. Patient adamantly and convincingly denies current suicidal or homicidal ideation or perceptual disturbance. Assisted Patient in processing session content; acknowledged and normalized Patient’s thoughts, feelings, and concerns by utilizing a person-centered approach in efforts to build appropriate rapport and a positive therapeutic relationship with open and honest communication.     Quality Measures:     TOBACCO USE:  Smoker, current status unknown    Follow Up:   Return in about 2 weeks (around 7/20/2022) for Psychotherapy Follow-Up.    Cristy Cartagena LCSW

## 2022-07-12 ENCOUNTER — HOSPITAL ENCOUNTER (EMERGENCY)
Facility: HOSPITAL | Age: 42
Discharge: HOME | End: 2022-07-12
Attending: EMERGENCY MEDICINE | Admitting: EMERGENCY MEDICINE

## 2022-07-12 VITALS
HEART RATE: 84 BPM | DIASTOLIC BLOOD PRESSURE: 88 MMHG | TEMPERATURE: 98.4 F | OXYGEN SATURATION: 99 % | HEIGHT: 66 IN | WEIGHT: 202 LBS | RESPIRATION RATE: 18 BRPM | SYSTOLIC BLOOD PRESSURE: 123 MMHG | BODY MASS INDEX: 32.47 KG/M2

## 2022-07-12 DIAGNOSIS — N28.9 RENAL INSUFFICIENCY: ICD-10-CM

## 2022-07-12 DIAGNOSIS — H92.09 EARACHE: ICD-10-CM

## 2022-07-12 DIAGNOSIS — R19.7 DIARRHEA, UNSPECIFIED TYPE: Primary | ICD-10-CM

## 2022-07-12 DIAGNOSIS — F43.9 STRESS: ICD-10-CM

## 2022-07-12 DIAGNOSIS — B37.0 ORAL THRUSH: ICD-10-CM

## 2022-07-12 LAB
ADV 40+41 DNA STL QL NAA+NON-PROBE: NOT DETECTED
ALBUMIN SERPL-MCNC: 4.5 G/DL (ref 3.5–5.2)
ALBUMIN/GLOB SERPL: 1.5 G/DL
ALP SERPL-CCNC: 143 U/L (ref 39–117)
ALT SERPL W P-5'-P-CCNC: 26 U/L (ref 1–33)
ANION GAP SERPL CALCULATED.3IONS-SCNC: 9 MMOL/L (ref 5–15)
AST SERPL-CCNC: 19 U/L (ref 1–32)
ASTRO TYP 1-8 RNA STL QL NAA+NON-PROBE: NOT DETECTED
BASOPHILS # BLD AUTO: 0.04 10*3/MM3 (ref 0–0.2)
BASOPHILS NFR BLD AUTO: 0.5 % (ref 0–1.5)
BILIRUB SERPL-MCNC: 0.2 MG/DL (ref 0–1.2)
BILIRUB UR QL STRIP: NEGATIVE
BUN SERPL-MCNC: 13 MG/DL (ref 6–20)
BUN/CREAT SERPL: 12.9 (ref 7–25)
C CAYETANENSIS DNA STL QL NAA+NON-PROBE: NOT DETECTED
C COLI+JEJ+UPSA DNA STL QL NAA+NON-PROBE: NOT DETECTED
CALCIUM SPEC-SCNC: 10.2 MG/DL (ref 8.6–10.5)
CHLORIDE SERPL-SCNC: 100 MMOL/L (ref 98–107)
CLARITY UR: CLEAR
CO2 SERPL-SCNC: 27 MMOL/L (ref 22–29)
COLOR UR: YELLOW
CREAT SERPL-MCNC: 1.01 MG/DL (ref 0.57–1)
CRYPTOSP DNA STL QL NAA+NON-PROBE: NOT DETECTED
DEPRECATED RDW RBC AUTO: 41 FL (ref 37–54)
E HISTOLYT DNA STL QL NAA+NON-PROBE: NOT DETECTED
EAEC PAA PLAS AGGR+AATA ST NAA+NON-PRB: NOT DETECTED
EC STX1+STX2 GENES STL QL NAA+NON-PROBE: NOT DETECTED
EGFRCR SERPLBLD CKD-EPI 2021: 71.4 ML/MIN/1.73
EOSINOPHIL # BLD AUTO: 0.23 10*3/MM3 (ref 0–0.4)
EOSINOPHIL NFR BLD AUTO: 2.8 % (ref 0.3–6.2)
EPEC EAE GENE STL QL NAA+NON-PROBE: NOT DETECTED
ERYTHROCYTE [DISTWIDTH] IN BLOOD BY AUTOMATED COUNT: 14.4 % (ref 12.3–15.4)
ETEC LTA+ST1A+ST1B TOX ST NAA+NON-PROBE: NOT DETECTED
G LAMBLIA DNA STL QL NAA+NON-PROBE: NOT DETECTED
GLOBULIN UR ELPH-MCNC: 3 GM/DL
GLUCOSE SERPL-MCNC: 128 MG/DL (ref 65–99)
GLUCOSE UR STRIP-MCNC: NEGATIVE MG/DL
HCT VFR BLD AUTO: 41.5 % (ref 34–46.6)
HGB BLD-MCNC: 13.6 G/DL (ref 12–15.9)
HGB UR QL STRIP.AUTO: NEGATIVE
HOLD SPECIMEN: NORMAL
IMM GRANULOCYTES # BLD AUTO: 0.03 10*3/MM3 (ref 0–0.05)
IMM GRANULOCYTES NFR BLD AUTO: 0.4 % (ref 0–0.5)
KETONES UR QL STRIP: NEGATIVE
LEUKOCYTE ESTERASE UR QL STRIP.AUTO: NEGATIVE
LIPASE SERPL-CCNC: 37 U/L (ref 13–60)
LYMPHOCYTES # BLD AUTO: 3.22 10*3/MM3 (ref 0.7–3.1)
LYMPHOCYTES NFR BLD AUTO: 39.6 % (ref 19.6–45.3)
MCH RBC QN AUTO: 26 PG (ref 26.6–33)
MCHC RBC AUTO-ENTMCNC: 32.8 G/DL (ref 31.5–35.7)
MCV RBC AUTO: 79.3 FL (ref 79–97)
MONOCYTES # BLD AUTO: 0.63 10*3/MM3 (ref 0.1–0.9)
MONOCYTES NFR BLD AUTO: 7.7 % (ref 5–12)
NEUTROPHILS NFR BLD AUTO: 3.99 10*3/MM3 (ref 1.7–7)
NEUTROPHILS NFR BLD AUTO: 49 % (ref 42.7–76)
NITRITE UR QL STRIP: NEGATIVE
NOROVIRUS GI+II RNA STL QL NAA+NON-PROBE: NOT DETECTED
NRBC BLD AUTO-RTO: 0 /100 WBC (ref 0–0.2)
P SHIGELLOIDES DNA STL QL NAA+NON-PROBE: NOT DETECTED
PH UR STRIP.AUTO: 5.5 [PH] (ref 5–8)
PLATELET # BLD AUTO: 358 10*3/MM3 (ref 140–450)
PMV BLD AUTO: 9 FL (ref 6–12)
POTASSIUM SERPL-SCNC: 3.6 MMOL/L (ref 3.5–5.2)
PROT SERPL-MCNC: 7.5 G/DL (ref 6–8.5)
PROT UR QL STRIP: NEGATIVE
RBC # BLD AUTO: 5.23 10*6/MM3 (ref 3.77–5.28)
RVA RNA STL QL NAA+NON-PROBE: NOT DETECTED
S ENT+BONG DNA STL QL NAA+NON-PROBE: NOT DETECTED
SAPO I+II+IV+V RNA STL QL NAA+NON-PROBE: NOT DETECTED
SHIGELLA SP+EIEC IPAH ST NAA+NON-PROBE: NOT DETECTED
SODIUM SERPL-SCNC: 136 MMOL/L (ref 136–145)
SP GR UR STRIP: 1.02 (ref 1–1.03)
UROBILINOGEN UR QL STRIP: NORMAL
V CHOL+PARA+VUL DNA STL QL NAA+NON-PROBE: NOT DETECTED
V CHOLERAE DNA STL QL NAA+NON-PROBE: NOT DETECTED
WBC NRBC COR # BLD: 8.14 10*3/MM3 (ref 3.4–10.8)
WHOLE BLOOD HOLD COAG: NORMAL
WHOLE BLOOD HOLD SPECIMEN: NORMAL
Y ENTEROCOL DNA STL QL NAA+NON-PROBE: NOT DETECTED

## 2022-07-12 PROCEDURE — 85025 COMPLETE CBC W/AUTO DIFF WBC: CPT | Performed by: EMERGENCY MEDICINE

## 2022-07-12 PROCEDURE — 81003 URINALYSIS AUTO W/O SCOPE: CPT | Performed by: EMERGENCY MEDICINE

## 2022-07-12 PROCEDURE — 99283 EMERGENCY DEPT VISIT LOW MDM: CPT

## 2022-07-12 PROCEDURE — 83690 ASSAY OF LIPASE: CPT | Performed by: EMERGENCY MEDICINE

## 2022-07-12 PROCEDURE — 87507 IADNA-DNA/RNA PROBE TQ 12-25: CPT | Performed by: EMERGENCY MEDICINE

## 2022-07-12 PROCEDURE — 80053 COMPREHEN METABOLIC PANEL: CPT | Performed by: EMERGENCY MEDICINE

## 2022-07-12 RX ORDER — LORAZEPAM 2 MG/ML
0.5 INJECTION INTRAMUSCULAR ONCE
Status: DISCONTINUED | OUTPATIENT
Start: 2022-07-12 | End: 2022-07-12

## 2022-07-12 RX ORDER — ALPRAZOLAM 0.5 MG/1
0.5 TABLET ORAL 2 TIMES DAILY PRN
Qty: 5 TABLET | Refills: 0 | Status: SHIPPED | OUTPATIENT
Start: 2022-07-12 | End: 2022-08-17

## 2022-07-12 RX ORDER — SODIUM CHLORIDE 9 MG/ML
10 INJECTION INTRAVENOUS AS NEEDED
Status: DISCONTINUED | OUTPATIENT
Start: 2022-07-12 | End: 2022-07-12 | Stop reason: HOSPADM

## 2022-07-12 RX ADMIN — SODIUM CHLORIDE 1000 ML: 9 INJECTION, SOLUTION INTRAVENOUS at 17:15

## 2022-07-13 ENCOUNTER — TELEMEDICINE (OUTPATIENT)
Dept: BEHAVIORAL HEALTH | Facility: CLINIC | Age: 42
End: 2022-07-13

## 2022-07-13 VITALS — WEIGHT: 202 LBS | BODY MASS INDEX: 32.47 KG/M2 | HEIGHT: 66 IN

## 2022-07-13 DIAGNOSIS — F43.21 GRIEF: ICD-10-CM

## 2022-07-13 DIAGNOSIS — F43.11 ACUTE POSTTRAUMATIC STRESS DISORDER: ICD-10-CM

## 2022-07-13 DIAGNOSIS — F31.4 BIPOLAR DISORDER, CURRENT EPISODE DEPRESSED, SEVERE, WITHOUT PSYCHOTIC FEATURES: Primary | ICD-10-CM

## 2022-07-13 PROBLEM — F31.9 AFFECTIVE PSYCHOSIS, BIPOLAR: Status: ACTIVE | Noted: 2022-07-13

## 2022-07-13 PROCEDURE — 99214 OFFICE O/P EST MOD 30 MIN: CPT

## 2022-07-13 RX ORDER — HYDROXYZINE PAMOATE 100 MG/1
100 CAPSULE ORAL 3 TIMES DAILY PRN
Qty: 60 CAPSULE | Refills: 1 | Status: SHIPPED | OUTPATIENT
Start: 2022-07-13

## 2022-07-13 RX ORDER — BUSPIRONE HYDROCHLORIDE 30 MG/1
30 TABLET ORAL 2 TIMES DAILY
Qty: 30 TABLET | Refills: 1 | Status: SHIPPED | OUTPATIENT
Start: 2022-07-13 | End: 2022-08-12

## 2022-07-13 NOTE — PROGRESS NOTES
Video Visit      Patient Name: Chencho Bah  : 1980    MRN: [id number]     Referring Physician: Savage Orozco PA    Chief Complaint:      ICD-10-CM ICD-9-CM   1. Bipolar disorder, current episode depressed, severe, without psychotic features ([facility])  F31.4 296.53   2. Grief  F43.21 309.0   3. Acute posttraumatic stress disorder  F43.11 309.81        This provider is located at [facility], using a secure [employer] Video Visit through [employer]. Chencho Bah is being seen remotely via telehealth at their home address in [place], and stated they are in a secure environment for this session. The patient's condition being diagnosed/treated is appropriate for telemedicine. I TEJA Andujar identified myself as well as my credentials.   The patient, and/or patients guardian, consent to be seen remotely, and when consent is given they understand that the consent allows for patient identifiable information to be sent to a third party as needed.   They may refuse to be seen remotely at any time. The electronic data is encrypted and password protected, and the patient and/or guardian has been advised of the potential risks to privacy not withstanding such measures.     You have chosen to receive care through a telehealth visit.  Do you consent to use a video/audio connection for your medical care today?  Yes  This visit complies with patient safety concerns in accordance with CDC recommendations.    History of Present Illness: Chencho Bah is a 42 y.o. female who I spoke with on video visit today for medication follow up related to multiple behavioral health concerns.  Patient states that her mood is not any better.  She continually expressed depressive and anxiety symptoms.  She states that she had to stop lamotrigine related to rash development and once medicine stop the rash went away.  She reports continued sadness, grief, anhedonia, racing thoughts, panic  attacks, and generalized anxiety.  She expresses frustration with her multiple physical/medical issues and current management regarding these.  Continue to expressed concern about mood instability and continued difficulty dealing with the loss of her significant other.      Subjective     Review of Systems:   Review of Systems   Constitutional: Positive for fatigue.   Gastrointestinal: Negative.    Musculoskeletal: Negative.    Skin: Negative.    Psychiatric/Behavioral: Positive for dysphoric mood and sleep disturbance. The patient is nervous/anxious.          Patient History:   The following portions of the patient's history were reviewed and updated as appropriate: allergies, current medications, past family history, past medical history, past social history, past surgical history and problem list.     Social:  No change interval history.    Medications:     Current Outpatient Medications:   •  ALPRAZolam (XANAX) 0.5 MG tablet, Take 1 tablet by mouth 2 (Two) Times a Day As Needed for Anxiety., Disp: 5 tablet, Rfl: 0  •  amLODIPine (NORVASC) 5 MG tablet, Take 1 tablet by mouth Daily., Disp: 90 tablet, Rfl: 3  •  atorvastatin (LIPITOR) 10 MG tablet, Take 1 tablet by mouth Daily., Disp: 90 tablet, Rfl: 0  •  B-12 Methylcobalamin 1000 MCG tablet dispersible, Place 1,000 mg on the tongue Daily., Disp: 90 tablet, Rfl: 3  •  brompheniramine-pseudoephedrine-DM 30-2-10 MG/5ML syrup, Take 5 mL by mouth 4 (Four) Times a Day As Needed for Congestion., Disp: 200 mL, Rfl: 0  •  busPIRone (BUSPAR) 30 MG tablet, Take 1 tablet by mouth 2 (Two) Times a Day for 30 days., Disp: 30 tablet, Rfl: 1  •  Cariprazine HCl (Vraylar) 1.5 MG capsule capsule, Take 1 capsule by mouth Daily for 7 days, THEN 2 capsules Daily for 14 days., Disp: 35 capsule, Rfl: 0  •  cefdinir (OMNICEF) 300 MG capsule, Take 1 capsule by mouth 2 (Two) Times a Day., Disp: 14 capsule, Rfl: 0  •  chlorhexidine (HIBICLENS) 4 % external liquid, Apply  topically to the  appropriate area as directed Daily As Needed for Wound Care., Disp: 236 mL, Rfl: 2  •  clindamycin (CLEOCIN T) 1 % lotion, Apply  topically to the appropriate area as directed 2 (Two) Times a Day., Disp: 60 mL, Rfl: 5  •  cloNIDine (CATAPRES) 0.1 MG tablet, Take 1 tablet by mouth 3 (Three) Times a Day As Needed for High Blood Pressure., Disp: 90 tablet, Rfl: 3  •  Creon 85226-076562 units capsule delayed-release particles capsule, TAKE 1 CAPSULE BY MOUTH TWICE A DAY WITH MEALS, Disp: 180 capsule, Rfl: 3  •  cyclobenzaprine (FLEXERIL) 10 MG tablet, Take 1 tablet by mouth 3 (Three) Times a Day As Needed for Muscle Spasms., Disp: 270 tablet, Rfl: 3  •  dexlansoprazole (Dexilant) 60 MG capsule, Take 1 capsule by mouth Daily., Disp: 90 capsule, Rfl: 3  •  dicyclomine (BENTYL) 20 MG tablet, Take 1 tablet by mouth 4 (Four) Times a Day., Disp: 120 tablet, Rfl: 5  •  diphenoxylate-atropine (LOMOTIL) 2.5-0.025 MG per tablet, TAKE 1 TABLET BY MOUTH 4 TIMES A DAY AS NEEDED FOR DIARRHEA., Disp: 15 tablet, Rfl: 0  •  ergocalciferol (ERGOCALCIFEROL) 1.25 MG (46186 UT) capsule, Take 1 capsule by mouth 1 (One) Time Per Week., Disp: 5 capsule, Rfl: 11  •  hydrOXYzine pamoate (VISTARIL) 100 MG capsule, Take 1 capsule by mouth 3 (Three) Times a Day As Needed for Itching or Anxiety (For panic attacks)., Disp: 60 capsule, Rfl: 1  •  ibuprofen (ADVIL,MOTRIN) 800 MG tablet, TAKE 1 TABLET BY MOUTH EVERY 8 (EIGHT) HOURS AS NEEDED FOR MILD PAIN ., Disp: 90 tablet, Rfl: 0  •  imipramine (TOFRANIL) 25 MG tablet, Take 1 tablet by mouth Daily., Disp: 90 tablet, Rfl: 3  •  Multiple Vitamin (THERA-TABS) tablet, Take 1 tablet by mouth Every Morning., Disp: , Rfl: 0  •  nystatin (MYCOSTATIN) 100,000 unit/mL suspension, Take 5 mL by mouth 4 (Four) Times a Day., Disp: 60 mL, Rfl: 0  •  ondansetron (ZOFRAN) 4 MG tablet, Take 1 tablet by mouth See Admin Instructions. Take 1 tablet by mouth every 6-8 hours as needed, Disp: 30 tablet, Rfl: 5  •  PREMARIN  "0.625 MG/GM vaginal cream, , Disp: , Rfl:   •  promethazine (PHENERGAN) 25 MG tablet, Take 1 tablet by mouth Every 6 (Six) Hours As Needed for Nausea or Vomiting., Disp: 30 tablet, Rfl: 5  •  terconazole (Terazol 7) 0.4 % vaginal cream, Insert 1 applicator into the vagina Every Night., Disp: 1 each, Rfl: 1  No current facility-administered medications for this visit.    Objective     Physical Exam:  Vital Signs:   Vitals:    07/13/22 1221   Weight: 91.6 kg (202 lb)   Height: 167.6 cm (66\")     Body mass index is 32.6 kg/m².     Mental Status Exam:   Hygiene:   fair  Cooperation:  Cooperative  Eye Contact:  Good  Psychomotor Behavior:  Appropriate  Affect:  Full range  Mood: sad, depressed and anxious  Speech:  Normal  Thought Process:  Circum  Thought Content:  Mood congruent  Suicidal:  None  Homicidal:  None  Hallucinations:  None  Delusion:  None  Memory:  Intact  Orientation:  Grossly intact  Reliability:  fair  Insight:  Fair  Judgement:  Fair  Impulse Control:  Good  Physical/Medical Issues:  Yes Multiple medical issues, see chart     Assessment / Plan      Visit Diagnosis/Orders Placed This Visit:  Diagnoses and all orders for this visit:    1. Bipolar disorder, current episode depressed, severe, without psychotic features (HCC) (Primary)  -     Cariprazine HCl (Vraylar) 1.5 MG capsule capsule; Take 1 capsule by mouth Daily for 7 days, THEN 2 capsules Daily for 14 days.  Dispense: 35 capsule; Refill: 0    2. Grief  -     hydrOXYzine pamoate (VISTARIL) 100 MG capsule; Take 1 capsule by mouth 3 (Three) Times a Day As Needed for Itching or Anxiety (For panic attacks).  Dispense: 60 capsule; Refill: 1  -     busPIRone (BUSPAR) 30 MG tablet; Take 1 tablet by mouth 2 (Two) Times a Day for 30 days.  Dispense: 30 tablet; Refill: 1    3. Acute posttraumatic stress disorder  -     hydrOXYzine pamoate (VISTARIL) 100 MG capsule; Take 1 capsule by mouth 3 (Three) Times a Day As Needed for Itching or Anxiety (For panic " attacks).  Dispense: 60 capsule; Refill: 1  -     busPIRone (BUSPAR) 30 MG tablet; Take 1 tablet by mouth 2 (Two) Times a Day for 30 days.  Dispense: 30 tablet; Refill: 1         Differential:   N/A    Plan:   1. Discontinued lamotrigine related to rash formation.  Patient reports once Lamictal was discontinued rash resolved.  2. Increase BuSpar to 30 mg twice a day.  3. Initiate Vraylar 1.5 mg in the morning.  4. Continue psychotherapy with LCSW scheduled.  5. Reordered hydroxyzine pamoate for panic attacks.    Continue supportive psychotherapy efforts and medications as indicated. Treatment and medication options discussed during today's visit. Patient ackowledged and verbally consented to continue with current treatment plan and was educated on the importance of compliance with treatment and follow-up appointments. Patient seems reasonably able to adhere to treatment plan.      Medication Considerations:  Discussed medication options and treatment plan of prescribed medication(s) as well as the risks, benefits, and potential side effects. Patient is agreeable to call the office with any worsening of symptoms or onset of side effects. Patient is agreeable to call 911 or go to the nearest ER should he/she begin having SI/HI.    Quality Measures:   Current every day smoker less than 3 minutes spent counseling Not agreeable to stopping    I advised Chencho Bah of the risks of tobacco use.     Follow Up:   Return in about 2 weeks (around 7/27/2022) for Recheck, Video visit.      TEJA Baker, PMHNP-BC

## 2022-07-18 ENCOUNTER — OFFICE VISIT (OUTPATIENT)
Dept: FAMILY MEDICINE CLINIC | Facility: CLINIC | Age: 42
End: 2022-07-18

## 2022-07-18 VITALS
WEIGHT: 203.4 LBS | HEART RATE: 95 BPM | DIASTOLIC BLOOD PRESSURE: 82 MMHG | SYSTOLIC BLOOD PRESSURE: 120 MMHG | BODY MASS INDEX: 32.69 KG/M2 | TEMPERATURE: 97.1 F | HEIGHT: 66 IN | OXYGEN SATURATION: 100 %

## 2022-07-18 DIAGNOSIS — K59.1 FUNCTIONAL DIARRHEA: Primary | ICD-10-CM

## 2022-07-18 DIAGNOSIS — K13.79 ORAL PAIN: ICD-10-CM

## 2022-07-18 PROCEDURE — 99214 OFFICE O/P EST MOD 30 MIN: CPT | Performed by: FAMILY MEDICINE

## 2022-07-18 RX ORDER — CHOLESTYRAMINE LIGHT 4 G/5.7G
4 POWDER, FOR SUSPENSION ORAL 2 TIMES DAILY
Qty: 60 PACKET | Refills: 1 | Status: SHIPPED | OUTPATIENT
Start: 2022-07-18 | End: 2022-08-17

## 2022-07-18 RX ORDER — LEVOCETIRIZINE DIHYDROCHLORIDE 5 MG/1
TABLET, FILM COATED ORAL
COMMUNITY
Start: 2022-06-26 | End: 2022-10-17

## 2022-07-18 RX ORDER — ROSUVASTATIN CALCIUM 5 MG/1
TABLET, COATED ORAL
COMMUNITY
Start: 2022-05-20 | End: 2022-07-18

## 2022-07-18 RX ORDER — METRONIDAZOLE 500 MG/1
TABLET ORAL
COMMUNITY
Start: 2022-06-18 | End: 2022-07-18

## 2022-07-18 NOTE — ASSESSMENT & PLAN NOTE
1.  Start Questran in case this is related to cholecystectomy  2.  May use Imodium as needed  3.  Food allergy panel ordered.  This is been ordered by her gastroenterologist earlier in the year but due to extenuating  circumstances patient did not have testing performed.  4.  Cautioned patient to exercise patient's.  Follow-up in 1 month  5.  No evidence of microscopic colitis on previous colonoscopies.  Patient will stop statin therapies at this time.

## 2022-07-18 NOTE — PROGRESS NOTES
"Chief Complaint   Patient presents with   • Abdominal Pain     Pt states that she has been having diarrhea for months. Pt states that she has been to gastro but they have been unable to help her.     • Oral Pain       Subjective      Chencho Bah is a 42 y.o. who presents for diarrhea which has been a longstanding complaint for which she has seen multiple providers both in this office and gastroenterology specialists.  Interview and review of records indicates diarrhea may have begun after cholecystectomy.  She has been diagnosed with pancreatic insufficiency as well and claims a history of a parasitic infection.  Currently her diarrhea is controlled by taking Imodium on a near daily basis and last week a prescription for Lomotil was called in.    Patient also has oral pain and claims to have recurring episodes of thrush.  This is now the second time in 3 months I had seen the patient for complaints of oral discomfort.  At the first visit no thrush was evident and I believed it was related to multiple medications that contribute to dry mouth.  Patient's dentist has suggested possibly infectious disease.    Objective   Vital Signs:  /82   Pulse 95   Temp 97.1 °F (36.2 °C) (Temporal)   Ht 167.6 cm (65.98\")   Wt 92.3 kg (203 lb 6.4 oz)   SpO2 100%   BMI 32.85 kg/m²       Physical Exam  HENT:      Mouth/Throat:      Mouth: Mucous membranes are moist.      Pharynx: Oropharynx is clear. No oropharyngeal exudate or posterior oropharyngeal erythema.      Comments: No thrush was seen.  Saliva was somewhat thickened  Neurological:      Mental Status: She is alert.          Result Review                     Assessment and Plan  Diagnoses and all orders for this visit:    1. Functional diarrhea (Primary)  Assessment & Plan:  1.  Start Questran in case this is related to cholecystectomy  2.  May use Imodium as needed  3.  Food allergy panel ordered.  This is been ordered by her gastroenterologist earlier in " the year but due to extenuating  circumstances patient did not have testing performed.  4.  Cautioned patient to exercise patient's.  Follow-up in 1 month  5.  No evidence of microscopic colitis on previous colonoscopies.  Patient will stop statin therapies at this time.    Orders:  -     Food Allergy Profile  -     cholestyramine light (Prevalite) 4 g packet; Take 1 packet by mouth 2 (Two) Times a Day.  Dispense: 60 packet; Refill: 1    2. Oral pain  Comments:  [facility] medicines          Follow Up  Return in about 1 month (around 8/18/2022) for Recheck Diarrhea.  Patient was given instructions and counseling regarding her condition or for health maintenance advice. Please see specific information pulled into the AVS if appropriate.

## 2022-07-19 DIAGNOSIS — K86.1 IDIOPATHIC CHRONIC PANCREATITIS: ICD-10-CM

## 2022-07-19 DIAGNOSIS — M79.7 FIBROMYALGIA: ICD-10-CM

## 2022-07-19 RX ORDER — IBUPROFEN 800 MG/1
800 TABLET ORAL EVERY 8 HOURS PRN
Qty: 90 TABLET | Refills: 0 | Status: SHIPPED | OUTPATIENT
Start: 2022-07-19

## 2022-07-21 ENCOUNTER — TELEPHONE (OUTPATIENT)
Dept: FAMILY MEDICINE CLINIC | Facility: CLINIC | Age: 42
End: 2022-07-21

## 2022-07-21 LAB
CLAM IGE QN: <0.1 KU/L
CODFISH IGE QN: <0.1 KU/L
CONV CLASS DESCRIPTION: ABNORMAL
CORN IGE QN: <0.1 KU/L
COW MILK IGE QN: <0.1 KU/L
EGG WHITE IGE QN: <0.1 KU/L
PEANUT IGE QN: 0.1 KU/L
SCALLOP IGE QN: <0.1 KU/L
SESAME SEED IGE QN: 0.21 KU/L
SHRIMP IGE QN: 0.38 KU/L
SOYBEAN IGE QN: <0.1 KU/L
WALNUT IGE QN: <0.1 KU/L
WHEAT IGE QN: <0.1 KU/L

## 2022-07-21 NOTE — TELEPHONE ENCOUNTER
Caller: Chencho Bah    Relationship: Self    Best call back number: [phone]    Who is your current provider: GRIS PALMER    Who would you like your new provider to be: LALO CARTER    What are your reasons for transferring care: DR CARTER IS TRYING TO WORK WITH HER AND SHE FEELS MORE COMFORTABLE WITH HIM AND HIS EFFORTS FOR HER HEALTHCARE.    Additional notes: PLEASE CALL ADVISE

## 2022-07-21 NOTE — TELEPHONE ENCOUNTER
Caller: Chencho Bah    Relationship: Self    Best call back number: [phone]    What orders are you requesting (i.e. lab or imaging): LAB TO TEST FOR METAL IN HER BLOODSTREAM    In what timeframe would the patient need to come in: ASAP    Where will you receive your lab/imaging services: IN OFFICE    Additional notes: SHE HAS HAD SEVERAL HIP SURGERIES AND SHE HAS HAD THIS HAPPEN BEFORE. SHE HAS A METALLIC TASTE IN HER MOUTH.    PLEASE HAVE DR CARTER OR HIS NURSE CALL AND ADVISE

## 2022-07-22 NOTE — TELEPHONE ENCOUNTER
We will have to discuss this at her follow-up visit.  Ordering these labs are not going to change the level of pain.  I will see her as planned next month.  If additional labs are needed at that time they can be ordered.

## 2022-07-22 NOTE — TELEPHONE ENCOUNTER
Please see note for ordering labs. She is trying to get this done today. She is in a lot of pain   Stable

## 2022-07-25 NOTE — TELEPHONE ENCOUNTER
"Pt called to check status of ordering labs. Stated she had gotten the run around and jumping through hoops. She stated \"this is ridiculous of having to go through this. I will just go to the hospital and find me a new doctor\"  "

## 2022-07-28 ENCOUNTER — OFFICE VISIT (OUTPATIENT)
Dept: OBSTETRICS AND GYNECOLOGY | Facility: CLINIC | Age: 42
End: 2022-07-28

## 2022-07-28 VITALS
SYSTOLIC BLOOD PRESSURE: 118 MMHG | DIASTOLIC BLOOD PRESSURE: 84 MMHG | BODY MASS INDEX: 32.92 KG/M2 | WEIGHT: 204.8 LBS | HEIGHT: 66 IN

## 2022-07-28 DIAGNOSIS — Z12.31 SCREENING MAMMOGRAM FOR BREAST CANCER: ICD-10-CM

## 2022-07-28 DIAGNOSIS — Z01.419 WELL WOMAN EXAM WITH ROUTINE GYNECOLOGICAL EXAM: Primary | ICD-10-CM

## 2022-07-28 DIAGNOSIS — N89.8 VAGINAL DISCHARGE: ICD-10-CM

## 2022-07-28 LAB
KOH PREP NAIL: NORMAL
WET PREP GENITAL: NEGATIVE

## 2022-07-28 PROCEDURE — 87210 SMEAR WET MOUNT SALINE/INK: CPT | Performed by: NURSE PRACTITIONER

## 2022-07-28 PROCEDURE — 87220 TISSUE EXAM FOR FUNGI: CPT | Performed by: NURSE PRACTITIONER

## 2022-07-28 PROCEDURE — 99212 OFFICE O/P EST SF 10 MIN: CPT | Performed by: NURSE PRACTITIONER

## 2022-07-28 RX ORDER — HYDROXYZINE 50 MG/1
TABLET, FILM COATED ORAL
COMMUNITY
Start: 2022-07-19 | End: 2022-07-28 | Stop reason: DRUGHIGH

## 2022-07-28 RX ORDER — BUSPIRONE HYDROCHLORIDE 15 MG/1
TABLET ORAL
COMMUNITY
Start: 2022-07-28 | End: 2022-07-28 | Stop reason: SDUPTHER

## 2022-07-28 NOTE — PROGRESS NOTES
"     Gynecologic Annual Exam Note        GYN Annual Exam     CC - Here for annual exam.        HPI  Chencho Bah is a 42 y.o. female, , who presents for annual well woman exam as a established patient.  She iss/p CHRISTIANO in approx [phone]. Denies vaginal bleeding.  Patient reports problems with: hot flashes, moodiness and night sweats. There were no changes to her medical or surgical history since her last visit.. Partner Status: Marital Status: .  She is is not currently sexually active. STD testing recommendations have been explained to the patient and she does not desire STD testing. Pt does have a bump at her panty line that she would like to have looked at. Pt states that these kinds of places come and go but never seem to go all of the way away. Pt states that she did use terconazole cream two days ago and symptoms have improved.     Additional OB/GYN History   On HRT? No    Last Pap : 22. Results: negative. HPV: negative  Last Completed Pap Smear     This patient has no relevant Health Maintenance data.        History of abnormal Pap smear: yes - 10/2021  Family history of uterine, colon, breast, or ovarian cancer: no  Performs monthly Self-Breast Exam: no  Last mammogram: never.     Last Completed Mammogram     This patient has no relevant Health Maintenance data.        Last colonoscopy: has had a colonoscopy    Last Completed Colonoscopy     This patient has no relevant Health Maintenance data.            Last bone density scan (DEXA): no  Exercises Regularly: no  Feelings of Anxiety or Depression: yes - pt states \"all the time\"      Tobacco Usage?: Yes Chencho Bah  reports that she has been smoking cigarettes. She has been smoking about 1.00 pack per day. She has never used smokeless tobacco.. I have educated her on the risk of diseases from using tobacco products such as cancer, COPD and heart disease.     I advised her to quit and she is not willing to quit.    I spent " < 3 minutes counseling the patient.            Current Outpatient Medications:   •  B-12 Methylcobalamin 1000 MCG tablet dispersible, Place 1,000 mg on the tongue Daily., Disp: 90 tablet, Rfl: 3  •  busPIRone (BUSPAR) 30 MG tablet, Take 1 tablet by mouth 2 (Two) Times a Day for 30 days., Disp: 30 tablet, Rfl: 1  •  chlorhexidine (HIBICLENS) 4 % external liquid, Apply  topically to the appropriate area as directed Daily As Needed for Wound Care., Disp: 236 mL, Rfl: 2  •  cholestyramine light (Prevalite) 4 g packet, Take 1 packet by mouth 2 (Two) Times a Day., Disp: 60 packet, Rfl: 1  •  cloNIDine (CATAPRES) 0.1 MG tablet, Take 1 tablet by mouth 3 (Three) Times a Day As Needed for High Blood Pressure., Disp: 90 tablet, Rfl: 3  •  Creon [id number] units capsule delayed-release particles capsule, TAKE 1 CAPSULE BY MOUTH TWICE A DAY WITH MEALS, Disp: 180 capsule, Rfl: 3  •  cyclobenzaprine (FLEXERIL) 10 MG tablet, Take 1 tablet by mouth 3 (Three) Times a Day As Needed for Muscle Spasms., Disp: 270 tablet, Rfl: 3  •  dexlansoprazole (Dexilant) 60 MG capsule, Take 1 capsule by mouth Daily., Disp: 90 capsule, Rfl: 3  •  dicyclomine (BENTYL) 20 MG tablet, Take 1 tablet by mouth 4 (Four) Times a Day., Disp: 120 tablet, Rfl: 5  •  ergocalciferol (ERGOCALCIFEROL) 1.25 MG (72273 UT) capsule, Take 1 capsule by mouth 1 (One) Time Per Week., Disp: 5 capsule, Rfl: 11  •  hydrOXYzine pamoate (VISTARIL) 100 MG capsule, Take 1 capsule by mouth 3 (Three) Times a Day As Needed for Itching or Anxiety (For panic attacks)., Disp: 60 capsule, Rfl: 1  •  ibuprofen (ADVIL,MOTRIN) 800 MG tablet, TAKE 1 TABLET BY MOUTH EVERY 8 (EIGHT) HOURS AS NEEDED FOR MILD PAIN ., Disp: 90 tablet, Rfl: 0  •  levocetirizine (XYZAL) 5 MG tablet, , Disp: , Rfl:   •  Multiple Vitamin (THERA-TABS) tablet, Take 1 tablet by mouth Every Morning., Disp: , Rfl: 0  •  ondansetron (ZOFRAN) 4 MG tablet, Take 1 tablet by mouth See Admin Instructions. Take 1 tablet by  "mouth every 6-8 hours as needed, Disp: 30 tablet, Rfl: 5  •  promethazine (PHENERGAN) 25 MG tablet, Take 1 tablet by mouth Every 6 (Six) Hours As Needed for Nausea or Vomiting., Disp: 30 tablet, Rfl: 5  •  terconazole (Terazol 7) 0.4 % vaginal cream, Insert 1 applicator into the vagina Every Night., Disp: 1 each, Rfl: 1  •  ALPRAZolam (XANAX) 0.5 MG tablet, Take 1 tablet by mouth 2 (Two) Times a Day As Needed for Anxiety., Disp: 5 tablet, Rfl: 0  •  Cariprazine HCl (Vraylar) 1.5 MG capsule capsule, Take 1 capsule by mouth Daily for 7 days, THEN 2 capsules Daily for 14 days., Disp: 35 capsule, Rfl: 0  •  clindamycin (CLEOCIN T) 1 % lotion, Apply  topically to the appropriate area as directed 2 (Two) Times a Day., Disp: 60 mL, Rfl: 5  •  nystatin (MYCOSTATIN) 100,000 unit/mL suspension, Take 5 mL by mouth 4 (Four) Times a Day., Disp: 60 mL, Rfl: 0  •  PREMARIN 0.625 MG/GM vaginal cream, , Disp: , Rfl:     Patient denies the need for medication refills today.    OB History        4    Para   2    Term   2            AB   2    Living   2       SAB   2    IAB        Ectopic        Molar        Multiple        Live Births   2                Past Medical History:   Diagnosis Date   • Acute nonintractable headache     nonspcecified headache type    • Acute otitis externa of left ear    • Acute otitis media    • Allergic    • Anxiety    • Arthritis    • Back pain    • Blurry vision    • Contact dermatitis due to poison ivy    • Depression    • Diarrhea    • Dizziness    • Edema    • Factor V Leiden mutation (HCC)     \"residential\"   • Fatigue    • Flatulence    • Fractures, compound     h/o    • Gastritis    • Gastroenteritis    • GERD (gastroesophageal reflux disease)    • H/O blood clots    • Hair loss    • Hidradenitis    • History of cardiac disorder    • History of kidney infection    • History of seizure disorder    • History of seizures    • Hypertension    • Joint pain, hip    • Low serum vitamin D    • " "Miscarriage    • Nausea    • Otalgia of left ear    • Pancreatitis    • Rash    • Renal calculi     personal h/o    • Screening breast examination     admits   • Speech complaints    • Vitamin D deficiency         Past Surgical History:   Procedure Laterality Date   • CHOLECYSTECTOMY     • COLONOSCOPY     • HIP SURGERY      multiple since 5th grade x 12 replacements as well    • TOTAL ABDOMINAL HYSTERECTOMY WITH SALPINGO OOPHORECTOMY Bilateral 2005    menometorrhagia   • TUBAL ABDOMINAL LIGATION     • UPPER GASTROINTESTINAL ENDOSCOPY         Health Maintenance   Topic Date Due   • COVID-19 Vaccine (1) Never done   • Pneumococcal Vaccine 0-64 (1 - PCV) Never done   • TDAP/TD VACCINES (2 - Tdap) 10/09/2019   • ANNUAL PHYSICAL  11/30/2019   • PAP SMEAR  07/27/2022   • INFLUENZA VACCINE  10/01/2022   • Annual Gynecologic Pelvic and Breast Exam  10/02/2022   • LIPID PANEL  05/12/2023   • HEPATITIS C SCREENING  Completed       The additional following portions of the patient's history were reviewed and updated as appropriate: allergies, current medications, past family history, past medical history, past social history, past surgical history and problem list.    Review of Systems   Constitutional: Negative.    Cardiovascular: Negative.    Gastrointestinal: Negative.    Endocrine: Positive for heat intolerance.   Genitourinary: Negative.    Psychiatric/Behavioral: The patient is nervous/anxious.        I have reviewed and agree with the HPI, ROS, and historical information as entered above. Estrella Ríos, APRN    Objective   /84   Ht 167.6 cm (65.98\")   Wt 92.9 kg (204 lb 12.8 oz)   Breastfeeding No   BMI 33.08 kg/m²     Physical Exam  Vitals and nursing note reviewed. Exam conducted with a chaperone present.   Constitutional:       Appearance: Normal appearance. She is well-developed. She is obese.   HENT:      Head: Normocephalic and atraumatic.   Neck:      Thyroid: No thyroid mass or thyromegaly. "   Cardiovascular:      Rate and Rhythm: Normal rate and regular rhythm.      Heart sounds: No murmur heard.  Pulmonary:      Effort: Pulmonary effort is normal. No retractions.      Breath sounds: Normal breath sounds. No wheezing, rhonchi or rales.   Chest:      Chest wall: No mass or tenderness.   Breasts:      Right: Normal. No mass, nipple discharge, skin change or tenderness.      Left: Normal. No mass, nipple discharge, skin change or tenderness.       Abdominal:      General: Bowel sounds are normal.      Palpations: Abdomen is soft. Abdomen is not rigid. There is no mass.      Tenderness: There is no abdominal tenderness. There is no guarding.      Hernia: No hernia is present. There is no hernia in the left inguinal area.   Genitourinary:     Labia:         Right: No rash, tenderness or lesion.         Left: Lesion present. No rash or tenderness.       Vagina: Vaginal discharge (white) present. No lesions.      Uterus: Absent.       Rectum: Normal. No external hemorrhoid.      Comments: approx 1cm sebaceous cyst left labia majora  Musculoskeletal:      Cervical back: Normal range of motion. No muscular tenderness.   Neurological:      Mental Status: She is alert and oriented to person, place, and time.   Psychiatric:         Behavior: Behavior normal.            Assessment and Plan    Problem List Items Addressed This Visit    None     Visit Diagnoses     Well woman exam with routine gynecological exam    -  Primary    Relevant Orders    LIQUID-BASED PAP SMEAR, [facility] ([facility])    Screening mammogram for breast cancer        Relevant Orders    Mammo Screening Digital Tomosynthesis Bilateral With CAD    Vaginal discharge        Relevant Orders    POC Wet Prep    POC KOH Prep          1. GYN annual well woman exam.   2. Wet prep and KOH testing negative  3. Reviewed monthly self breast exams.  Instructed to call with lumps, pain, or breast discharge.  Yearly mammograms ordered.  4. Ordered mammogram  today.  5. Reviewed exercise as a preventative health measures.   6. Reccommended Flu Vaccine in Fall of each year.  7. RTC in 1 year or PRN with problems.       Estrella Ríos, APRN  07/28/2022

## 2022-08-03 ENCOUNTER — OFFICE VISIT (OUTPATIENT)
Dept: BEHAVIORAL HEALTH | Facility: CLINIC | Age: 42
End: 2022-08-03

## 2022-08-03 VITALS — BODY MASS INDEX: 32.02 KG/M2 | WEIGHT: 204 LBS | HEIGHT: 67 IN

## 2022-08-03 DIAGNOSIS — F43.10 POST TRAUMATIC STRESS DISORDER (PTSD): ICD-10-CM

## 2022-08-03 DIAGNOSIS — F43.23 ADJUSTMENT DISORDER WITH MIXED ANXIETY AND DEPRESSED MOOD: Primary | ICD-10-CM

## 2022-08-03 DIAGNOSIS — F31.62 BIPOLAR DISORDER, CURRENT EPISODE MIXED, MODERATE: Primary | ICD-10-CM

## 2022-08-03 LAB — REF LAB TEST METHOD: NORMAL

## 2022-08-03 PROCEDURE — 99213 OFFICE O/P EST LOW 20 MIN: CPT

## 2022-08-03 PROCEDURE — 90832 PSYTX W PT 30 MINUTES: CPT | Performed by: SOCIAL WORKER

## 2022-08-03 NOTE — PROGRESS NOTES
"     Office  Follow Up Visit      Patient Name: Chencho Bah  : 1980   MRN: [id number]     Referring Provider: Steven Kang MD     Chief Complaint: Medication follow-up    ICD-10-CM ICD-9-CM   1. Bipolar disorder, current episode mixed, moderate ([facility])  F31.62 296.62        History of Present Illness:   Chencho Bah is a 42 y.o. female who is here today for follow up related to medication management of behavioral symptomology.  Patient reports that \"I did not start a new medication Vraylar, my insurance said it was not covered.\"  She reports continued mixed mood with continued racing thoughts, anxiety, depressive mood, fatigue, sleep disturbances, and anhedonia.  She states that she has switched primary care providers and has an upcoming appointment with her new provider for establishment of care and management of her chronic hip pain related to prior hip surgery.  Patient stating \"they have done a recall on my replacement.\"      Subjective      Review of Systems:   Review of Systems   Constitutional: Positive for activity change and fatigue.   Respiratory: Negative.    Cardiovascular: Negative.    Gastrointestinal: Negative.    Musculoskeletal: Positive for gait problem.   Skin: Negative.    Psychiatric/Behavioral: Positive for agitation, dysphoric mood and sleep disturbance. The patient is nervous/anxious.         Patient History:   The following portions of the patient's history were reviewed and updated as appropriate: allergies, current medications, past family history, past medical history, past social history, past surgical history and problem list.     Social:  No change in interval history.    Medications:     Current Outpatient Medications:   •  ALPRAZolam (XANAX) 0.5 MG tablet, Take 1 tablet by mouth 2 (Two) Times a Day As Needed for Anxiety., Disp: 5 tablet, Rfl: 0  •  B-12 Methylcobalamin 1000 MCG tablet dispersible, Place 1,000 mg on the tongue Daily., Disp: 90 tablet, " Rfl: 3  •  busPIRone (BUSPAR) 30 MG tablet, Take 1 tablet by mouth 2 (Two) Times a Day for 30 days., Disp: 30 tablet, Rfl: 1  •  Cariprazine HCl (Vraylar) 1.5 MG capsule capsule, Take 1 capsule by mouth Daily for 7 days, THEN 2 capsules Daily for 14 days., Disp: 35 capsule, Rfl: 0  •  chlorhexidine (HIBICLENS) 4 % external liquid, Apply  topically to the appropriate area as directed Daily As Needed for Wound Care., Disp: 236 mL, Rfl: 2  •  cholestyramine light (Prevalite) 4 g packet, Take 1 packet by mouth 2 (Two) Times a Day., Disp: 60 packet, Rfl: 1  •  clindamycin (CLEOCIN T) 1 % lotion, Apply  topically to the appropriate area as directed 2 (Two) Times a Day., Disp: 60 mL, Rfl: 5  •  cloNIDine (CATAPRES) 0.1 MG tablet, Take 1 tablet by mouth 3 (Three) Times a Day As Needed for High Blood Pressure., Disp: 90 tablet, Rfl: 3  •  Creon [id number] units capsule delayed-release particles capsule, TAKE 1 CAPSULE BY MOUTH TWICE A DAY WITH MEALS, Disp: 180 capsule, Rfl: 3  •  cyclobenzaprine (FLEXERIL) 10 MG tablet, Take 1 tablet by mouth 3 (Three) Times a Day As Needed for Muscle Spasms., Disp: 270 tablet, Rfl: 3  •  dexlansoprazole (Dexilant) 60 MG capsule, Take 1 capsule by mouth Daily., Disp: 90 capsule, Rfl: 3  •  dicyclomine (BENTYL) 20 MG tablet, Take 1 tablet by mouth 4 (Four) Times a Day., Disp: 120 tablet, Rfl: 5  •  ergocalciferol (ERGOCALCIFEROL) 1.25 MG (43694 UT) capsule, Take 1 capsule by mouth 1 (One) Time Per Week., Disp: 5 capsule, Rfl: 11  •  hydrOXYzine pamoate (VISTARIL) 100 MG capsule, Take 1 capsule by mouth 3 (Three) Times a Day As Needed for Itching or Anxiety (For panic attacks)., Disp: 60 capsule, Rfl: 1  •  ibuprofen (ADVIL,MOTRIN) 800 MG tablet, TAKE 1 TABLET BY MOUTH EVERY 8 (EIGHT) HOURS AS NEEDED FOR MILD PAIN ., Disp: 90 tablet, Rfl: 0  •  levocetirizine (XYZAL) 5 MG tablet, , Disp: , Rfl:   •  Multiple Vitamin (THERA-TABS) tablet, Take 1 tablet by mouth Every Morning., Disp: , Rfl:  "0  •  nystatin (MYCOSTATIN) 100,000 unit/mL suspension, Take 5 mL by mouth 4 (Four) Times a Day., Disp: 60 mL, Rfl: 0  •  ondansetron (ZOFRAN) 4 MG tablet, Take 1 tablet by mouth See Admin Instructions. Take 1 tablet by mouth every 6-8 hours as needed, Disp: 30 tablet, Rfl: 5  •  PREMARIN 0.625 MG/GM vaginal cream, , Disp: , Rfl:   •  promethazine (PHENERGAN) 25 MG tablet, Take 1 tablet by mouth Every 6 (Six) Hours As Needed for Nausea or Vomiting., Disp: 30 tablet, Rfl: 5  •  terconazole (Terazol 7) 0.4 % vaginal cream, Insert 1 applicator into the vagina Every Night., Disp: 1 each, Rfl: 1    Objective     Physical Exam:  Vital Signs:   Vitals:    08/03/22 1516   Weight: 92.5 kg (204 lb)   Height: 170.2 cm (67\")     Body mass index is 31.95 kg/m².     Mental Status Exam:   Hygiene:   good  Cooperation:  Cooperative  Eye Contact:  Good  Psychomotor Behavior:  Slow  Affect:  Restricted  Mood: depressed and anxious  Speech:  Normal  Thought Process:  Circum  Thought Content:  Mood congruent  Suicidal:  None  Homicidal:  None  Hallucinations:  None  Delusion:  None  Memory:  Intact  Orientation:  Grossly intact  Reliability:  good  Insight:  Fair  Judgement:  Fair  Impulse Control:  Good   Physical/Medical Issues:  Yes Extensive past medical history.  See chart     Assessment / Plan      Visit Diagnosis/Orders Placed This Visit:  Diagnoses and all orders for this visit:    1. Bipolar disorder, current episode mixed, moderate (HCC) (Primary)    -Initiate Vraylar 1.5 mg in the AM.  Increase to 3 mg on day 4.    Differential:   Posttraumatic stress disorder, grief, generalized disorder    Plan:   1. Initiate Vraylar 1.5 mg in the a.m.  Increase to 3 mg on day 4.  Provided samples in order to start medication regimen.  We will work with insurance to see what can be done for coverage.  2. Continue with other psychotropic medications as prescribed.  If Vraylar will be effective and once we are at a maintenance dose we will " consider weaning and discontinuation of other psychotropics.  3. Recommendation: Continued sleep hygiene and self-care.  4. Follow-up as scheduled with primary care provider related to hip.     Continue supportive psychotherapy efforts and medications as indicated. Treatment and medication options discussed during today's visit. Patient ackowledged and verbally consented to continue with current treatment plan and was educated on the importance of compliance with treatment and follow-up appointments. Patient seems reasonably able to adhere to treatment plan.      Medication Considerations:  Discussed medication options and treatment plan of prescribed medication(s) as well as the risks, benefits, and potential side effects. Patient is agreeable to call the office with any worsening of symptoms or onset of side effects. Patient is agreeable to call 911 or go to the nearest ER should he/she begin having SI/HI.    Quality Measures:   Current every day smoker less than 3 minutes spent counseling Not agreeable to stopping    I advised Chencho Bah of the risks of tobacco use.     Follow Up:   Return in about 2 weeks (around 8/17/2022).      TEJA Baker, PMHNP-BC

## 2022-08-03 NOTE — PROGRESS NOTES
[facility]                  Follow Up Adult      Follow Up Adult Note     Date:2022   Patient Name: Chencho Bah  : 1980   MRN: [id number]   Time IN: 2:00 PM    Time OUT: 2:30 PM     Referring Provider: Steven Kang MD    Chief Complaint:      ICD-10-CM ICD-9-CM   1. Adjustment disorder with mixed anxiety and depressed mood  F43.23 309.28   2. Post traumatic stress disorder (PTSD)  F43.10 309.81        History of Present Illness:   Chencho Bah is a 42 y.o. female who is being seen today for follow up individual Psychotherapy session.       Subjective     Assessment Scores:   PHQ-9 Total Score: PHQ-9 Total Score:     GISELA-7 Score:      Patient's Support Network Includes:  children and friends    Functional Status: Moderate impairment     Progress toward goal: Not at goal    Prognosis: Fair with Ongoing Treatment     Medications:     Current Outpatient Medications:   •  ALPRAZolam (XANAX) 0.5 MG tablet, Take 1 tablet by mouth 2 (Two) Times a Day As Needed for Anxiety., Disp: 5 tablet, Rfl: 0  •  B-12 Methylcobalamin 1000 MCG tablet dispersible, Place 1,000 mg on the tongue Daily., Disp: 90 tablet, Rfl: 3  •  busPIRone (BUSPAR) 30 MG tablet, Take 1 tablet by mouth 2 (Two) Times a Day for 30 days., Disp: 30 tablet, Rfl: 1  •  Cariprazine HCl (Vraylar) 1.5 MG capsule capsule, Take 1 capsule by mouth Daily for 7 days, THEN 2 capsules Daily for 14 days., Disp: 35 capsule, Rfl: 0  •  chlorhexidine (HIBICLENS) 4 % external liquid, Apply  topically to the appropriate area as directed Daily As Needed for Wound Care., Disp: 236 mL, Rfl: 2  •  cholestyramine light (Prevalite) 4 g packet, Take 1 packet by mouth 2 (Two) Times a Day., Disp: 60 packet, Rfl: 1  •  clindamycin (CLEOCIN T) 1 % lotion, Apply  topically to the appropriate area as directed 2 (Two) Times a Day., Disp: 60 mL, Rfl: 5  •  cloNIDine (CATAPRES) 0.1 MG tablet, Take 1  tablet by mouth 3 (Three) Times a Day As Needed for High Blood Pressure., Disp: 90 tablet, Rfl: 3  •  Creon 90835-039809 units capsule delayed-release particles capsule, TAKE 1 CAPSULE BY MOUTH TWICE A DAY WITH MEALS, Disp: 180 capsule, Rfl: 3  •  cyclobenzaprine (FLEXERIL) 10 MG tablet, Take 1 tablet by mouth 3 (Three) Times a Day As Needed for Muscle Spasms., Disp: 270 tablet, Rfl: 3  •  dexlansoprazole (Dexilant) 60 MG capsule, Take 1 capsule by mouth Daily., Disp: 90 capsule, Rfl: 3  •  dicyclomine (BENTYL) 20 MG tablet, Take 1 tablet by mouth 4 (Four) Times a Day., Disp: 120 tablet, Rfl: 5  •  ergocalciferol (ERGOCALCIFEROL) 1.25 MG (79361 UT) capsule, Take 1 capsule by mouth 1 (One) Time Per Week., Disp: 5 capsule, Rfl: 11  •  hydrOXYzine pamoate (VISTARIL) 100 MG capsule, Take 1 capsule by mouth 3 (Three) Times a Day As Needed for Itching or Anxiety (For panic attacks)., Disp: 60 capsule, Rfl: 1  •  ibuprofen (ADVIL,MOTRIN) 800 MG tablet, TAKE 1 TABLET BY MOUTH EVERY 8 (EIGHT) HOURS AS NEEDED FOR MILD PAIN ., Disp: 90 tablet, Rfl: 0  •  levocetirizine (XYZAL) 5 MG tablet, , Disp: , Rfl:   •  Multiple Vitamin (THERA-TABS) tablet, Take 1 tablet by mouth Every Morning., Disp: , Rfl: 0  •  nystatin (MYCOSTATIN) 100,000 unit/mL suspension, Take 5 mL by mouth 4 (Four) Times a Day., Disp: 60 mL, Rfl: 0  •  ondansetron (ZOFRAN) 4 MG tablet, Take 1 tablet by mouth See Admin Instructions. Take 1 tablet by mouth every 6-8 hours as needed, Disp: 30 tablet, Rfl: 5  •  PREMARIN 0.625 MG/GM vaginal cream, , Disp: , Rfl:   •  promethazine (PHENERGAN) 25 MG tablet, Take 1 tablet by mouth Every 6 (Six) Hours As Needed for Nausea or Vomiting., Disp: 30 tablet, Rfl: 5  •  terconazole (Terazol 7) 0.4 % vaginal cream, Insert 1 applicator into the vagina Every Night., Disp: 1 each, Rfl: 1    Allergies:   Allergies   Allergen Reactions   • Lamotrigine Rash     Do not re-challenge medication. Possible Jered-Estrada's.         Objective     Mental Status Exam:   Hygiene:   good  Cooperation:  Guarded  Eye Contact:  Downcast  Psychomotor Behavior:  Appropriate  Affect:  Full range  Mood: depressed  Speech:  Normal  Thought Process:  Cirsumstantial  Thought Content:  Mood congruent  Suicidal:  None  Homicidal:  None  Hallucinations:  None  Delusion:  None  Memory:  Intact  Orientation:  Person, Place, Time and Situation  Reliability:  Fair  Insight:  Fair  Judgement:  Fair  Impulse Control:  Good  Physical/Medical Issues:  Patient reported ongoing difficulties with thrush as well as concern about heavy metal poisining due to hip replacement     Assessment / Plan      Visit Diagnosis/Orders Placed This Visit:    ICD-10-CM ICD-9-CM   1. Adjustment disorder with mixed anxiety and depressed mood  F43.23 309.28   2. Post traumatic stress disorder (PTSD)  F43.10 309.81          PLAN:  1. Safety: No acute safety concerns  2. Risk Assessment: Risk of self-harm acutely is low. Risk of self-harm chronically is also low, but could be further elevated in the event of treatment noncompliance and/or AODA.    Patient met with the undersigned on this day in office for individual psychotherapy session. Therapist allowed patient to process worries and daily life stressors in session and offered support and validation of patient's emotional experience. Therapist and patient discussed the importance of attending to physical health and self-care as well as discussing physical health concerns with medical providers. Patient expressed understanding and agreement. Patient denied any current suicidal or homicidal ideation. She further denied any death wishes or auditory/visual hallucinations; oriented to person, place, time, and situation. Patient will continue bi-weekly outpatient psychotherapy.       Treatment Plan/Goals: Continue supportive psychotherapy efforts and medications as indicated. Treatment and medication options discussed during today's visit.  Patient ackowledged and verbally consented to continue with current treatment plan and was educated on the importance of compliance with treatment and follow-up appointments. Patient seems reasonably able to adhere to treatment plan.      Assisted Patient in processing above session content; acknowledged and normalized patient’s thoughts, feelings, and concerns.  Rationalized patient thought process regarding symptoms and daily life stressors.      Allowed Patient to freely discuss issues  without interruption or judgement with unconditional positive regard, active listening skills, and empathy. Therapist provided a safe, confidential environment to facilitate the development of a positive therapeutic relationship and encouraged open, honest communication. Assisted Patient in identifying risk factors which would indicate the need for higher level of care including thoughts to harm self or others and/or self-harming behavior and encouraged Patient to contact this office, call 911, or present to the nearest emergency room should any of these events occur. Discussed crisis intervention services and means to access. Patient adamantly and convincingly denies current suicidal or homicidal ideation or perceptual disturbance. Assisted Patient in processing session content; acknowledged and normalized Patient’s thoughts, feelings, and concerns by utilizing a person-centered approach in efforts to build appropriate rapport and a positive therapeutic relationship with open and honest communication.      Quality Measures:     TOBACCO USE:  Smoker, current status unknown    Follow Up:   Return in about 2 weeks (around 8/17/2022) for Psychotherapy Follow-Up.      Cristy Cartagena LCSW

## 2022-08-04 ENCOUNTER — TELEPHONE (OUTPATIENT)
Dept: BEHAVIORAL HEALTH | Facility: CLINIC | Age: 42
End: 2022-08-04

## 2022-08-04 DIAGNOSIS — F31.60 BIPOLAR DISORDER, MIXED: Primary | ICD-10-CM

## 2022-08-04 NOTE — TELEPHONE ENCOUNTER
Left message and informed her that I called [facility] and changed the prescription Vraylar 3.0 mg.     It is covered by her insurance.     Thanks

## 2022-08-12 ENCOUNTER — HOSPITAL ENCOUNTER (OUTPATIENT)
Dept: MAMMOGRAPHY | Facility: HOSPITAL | Age: 42
Discharge: HOME | End: 2022-08-12
Admitting: NURSE PRACTITIONER

## 2022-08-12 DIAGNOSIS — Z12.31 SCREENING MAMMOGRAM FOR BREAST CANCER: ICD-10-CM

## 2022-08-12 PROCEDURE — 77063 BREAST TOMOSYNTHESIS BI: CPT | Performed by: RADIOLOGY

## 2022-08-12 PROCEDURE — 77067 SCR MAMMO BI INCL CAD: CPT

## 2022-08-12 PROCEDURE — 77063 BREAST TOMOSYNTHESIS BI: CPT

## 2022-08-12 PROCEDURE — 77067 SCR MAMMO BI INCL CAD: CPT | Performed by: RADIOLOGY

## 2022-08-17 ENCOUNTER — TELEPHONE (OUTPATIENT)
Dept: FAMILY MEDICINE CLINIC | Facility: CLINIC | Age: 42
End: 2022-08-17

## 2022-08-17 ENCOUNTER — OFFICE VISIT (OUTPATIENT)
Dept: FAMILY MEDICINE CLINIC | Facility: CLINIC | Age: 42
End: 2022-08-17

## 2022-08-17 VITALS
WEIGHT: 201.6 LBS | OXYGEN SATURATION: 99 % | SYSTOLIC BLOOD PRESSURE: 110 MMHG | HEIGHT: 66 IN | DIASTOLIC BLOOD PRESSURE: 80 MMHG | TEMPERATURE: 97.1 F | RESPIRATION RATE: 20 BRPM | HEART RATE: 98 BPM | BODY MASS INDEX: 32.4 KG/M2

## 2022-08-17 DIAGNOSIS — K11.7 XEROSTOMIA: ICD-10-CM

## 2022-08-17 DIAGNOSIS — K59.1 FUNCTIONAL DIARRHEA: ICD-10-CM

## 2022-08-17 DIAGNOSIS — K13.79 ORAL PAIN: Primary | ICD-10-CM

## 2022-08-17 DIAGNOSIS — B37.0 THRUSH: ICD-10-CM

## 2022-08-17 DIAGNOSIS — T84.9XXD: ICD-10-CM

## 2022-08-17 DIAGNOSIS — D68.51 FACTOR 5 LEIDEN MUTATION, HETEROZYGOUS: ICD-10-CM

## 2022-08-17 DIAGNOSIS — Z96.642: ICD-10-CM

## 2022-08-17 DIAGNOSIS — K14.6 BURNING TONGUE SYNDROME: ICD-10-CM

## 2022-08-17 PROBLEM — G40.909 SEIZURE DISORDER (HCC): Status: RESOLVED | Noted: 2021-06-11 | Resolved: 2022-08-17

## 2022-08-17 PROCEDURE — 99214 OFFICE O/P EST MOD 30 MIN: CPT | Performed by: FAMILY MEDICINE

## 2022-08-17 RX ORDER — AMLODIPINE BESYLATE 5 MG/1
5 TABLET ORAL DAILY
COMMUNITY

## 2022-08-17 RX ORDER — DULOXETIN HYDROCHLORIDE 60 MG/1
CAPSULE, DELAYED RELEASE ORAL
COMMUNITY
Start: 2022-08-17 | End: 2022-08-17 | Stop reason: ALTCHOICE

## 2022-08-17 RX ORDER — CARIPRAZINE 1.5 MG/1
CAPSULE, GELATIN COATED ORAL
COMMUNITY
Start: 2022-08-04

## 2022-08-17 RX ORDER — FLUCONAZOLE 100 MG/1
TABLET ORAL
Qty: 15 TABLET | Refills: 0 | Status: SHIPPED | OUTPATIENT
Start: 2022-08-17 | End: 2023-01-03

## 2022-08-17 NOTE — TELEPHONE ENCOUNTER
Caller: Chencho Bah    Relationship: Self    Best call back number: [phone]    What is the best time to reach you: ANY    What was the call regarding: PATIENT WANTS MONKEY POX VACCINE. PLEASE CALL TO SCHEDULE    Do you require a callback: YES, PLEASE

## 2022-08-17 NOTE — PROGRESS NOTES
"Chief Complaint   Patient presents with   • FU on diarrhea        Subjective      Chencho Bah is a 42 y.o. who presents for follow-up visit.    Diarrhea.  Diarrhea has resolved.  She is not using cholestyramine.  She did stop her statin as instructed.  This may suggest microscopic colitis although there has been no history of that identified on previous colonoscopies.    Dry mouth/oral pain.  Patient has reduced medicines with anticholinergic side effects without any improvement oral pain, dry mouth, thickened white saliva.  She has been treated multiple times for thrush with very little or only transient improvement.    Patient has additional concern today which is heavy metal toxicity.  Patient has had a left hip replacement and her prosthesis underwent a recall.  We did find where the  has recommended cobalt and chromium testing of the blood.  Patient did see her orthopedist after the recall was initiated and had an additional surgical procedure although patient is unclear on what was done.    The following portions of the patient's history were reviewed and updated as appropriate: allergies, current medications, past surgical history and problem list.    Review of Systems    Objective   Vital Signs:  /80   Pulse 98   Temp 97.1 °F (36.2 °C)   Resp 20   Ht 167.6 cm (65.98\")   Wt 91.4 kg (201 lb 9.6 oz)   SpO2 99%   BMI 32.55 kg/m²           Physical Exam  HENT:      Mouth/Throat:      Mouth: No oral lesions.      Dentition: No dental tenderness, gingival swelling, dental caries or gum lesions.      Tongue: No lesions.      Comments: Saliva is thickened with white discoloration of the tongue.  No evidence of glossitis or mucositis  Neurological:      Mental Status: She is alert.          Result Review                     Assessment and Plan  Diagnoses and all orders for this visit:    1. Oral pain (Primary)  Comments:  r/o Sjogrens. May be psychosomatic. Repeat course of oral " fluconazole. If ineffective refer to ENT. Patient will call with update  Orders:  -     fluconazole (Diflucan) 100 MG tablet; 2 tablets on day one, then 1 tablet daily for 13 days  Dispense: 15 tablet; Refill: 0  -     Sjogren's Antibody, Anti-SS-A / -SS-B    2. Functional diarrhea    3. Complication of internal left hip prosthesis, unspecified complication, subsequent encounter  Comments:  Cobalt and Chromium ordered per hip prosthetic  recommendations  Orders:  -     Cobalt  -     Chromium Level    4. BMI 32.0-32.9,adult    5. Burning tongue syndrome  -     fluconazole (Diflucan) 100 MG tablet; 2 tablets on day one, then 1 tablet daily for 13 days  Dispense: 15 tablet; Refill: 0  -     Sjogren's Antibody, Anti-SS-A / -SS-B    6. Thrush  -     fluconazole (Diflucan) 100 MG tablet; 2 tablets on day one, then 1 tablet daily for 13 days  Dispense: 15 tablet; Refill: 0    7. Xerostomia  -     Sjogren's Antibody, Anti-SS-A / -SS-B    8. Factor 5 Leiden mutation, heterozygous (HCC)            Follow Up  No follow-ups on file.  Patient was given instructions and counseling regarding her condition or for health maintenance advice. Please see specific information pulled into the AVS if appropriate.

## 2022-08-17 NOTE — TELEPHONE ENCOUNTER
Pt transferred through to office. Was told we currently do not have the vaccine, unknown if/ when we will be getting it at this time. But can always check back later regarding this. Verified with clinical staff

## 2022-08-20 DIAGNOSIS — K58.0 IRRITABLE BOWEL SYNDROME WITH DIARRHEA: ICD-10-CM

## 2022-08-22 RX ORDER — DICYCLOMINE HCL 20 MG
TABLET ORAL
Qty: 120 TABLET | Refills: 5 | Status: SHIPPED | OUTPATIENT
Start: 2022-08-22

## 2022-08-24 LAB
COBALT SERPL-MCNC: <1 UG/L (ref 0–0.9)
CR SERPL-MCNC: 0.8 UG/L (ref 0.1–2.1)
ENA SS-A AB SER-ACNC: <0.2 AI (ref 0–0.9)
ENA SS-B AB SER-ACNC: <0.2 AI (ref 0–0.9)

## 2022-08-26 DIAGNOSIS — E78.2 MIXED HYPERLIPIDEMIA: ICD-10-CM

## 2022-08-26 RX ORDER — ATORVASTATIN CALCIUM 10 MG/1
TABLET, FILM COATED ORAL
Qty: 90 TABLET | Refills: 0 | OUTPATIENT
Start: 2022-08-26

## 2022-09-12 DIAGNOSIS — K59.1 FUNCTIONAL DIARRHEA: ICD-10-CM

## 2022-09-12 RX ORDER — CHOLESTYRAMINE 4 G/5.5G
POWDER, FOR SUSPENSION ORAL
Qty: 60 PACKET | Refills: 1 | Status: SHIPPED | OUTPATIENT
Start: 2022-09-12

## 2022-09-13 ENCOUNTER — TELEPHONE (OUTPATIENT)
Dept: FAMILY MEDICINE CLINIC | Facility: CLINIC | Age: 42
End: 2022-09-13

## 2022-09-13 NOTE — TELEPHONE ENCOUNTER
Caller: OlvinChencho    Relationship: Self    Best call back number: [phone]    What is the best time to reach you: AS SOON AS POSSIBLE    Who are you requesting to speak with (clinical staff, provider,  specific staff member): CLINICAL    What was the call regarding: PATIENT WOULD LIKE ORDERS PLACED FOR HER NEXT ROUND OF VACCINATIONS FOR RABIES, IF POSSIBLE. PATIENT STATES THAT [facility] STARTED THE VACCINES, BUT SHE IS IN A HURRY TO GET THEM COMPLETED AT [facility] AND WOULD LIKE DR. CARTER TO PLACE THIS ORDER, IF POSSIBLE.    PLEASE CALL BACK AS SOON AS POSSIBLE TO ADVISE.    THANK YOU.

## 2022-10-02 DIAGNOSIS — F31.60 BIPOLAR DISORDER, MIXED: ICD-10-CM

## 2022-10-04 RX ORDER — CARIPRAZINE 3 MG/1
CAPSULE, GELATIN COATED ORAL
Qty: 30 CAPSULE | Refills: 1 | OUTPATIENT
Start: 2022-10-04

## 2022-10-17 RX ORDER — LEVOCETIRIZINE DIHYDROCHLORIDE 5 MG/1
TABLET, FILM COATED ORAL
Qty: 30 TABLET | Refills: 5 | Status: SHIPPED | OUTPATIENT
Start: 2022-10-17

## 2022-12-28 ENCOUNTER — TELEPHONE (OUTPATIENT)
Dept: OBSTETRICS AND GYNECOLOGY | Facility: CLINIC | Age: 42
End: 2022-12-28

## 2022-12-28 NOTE — TELEPHONE ENCOUNTER
Caller: BRAXTON RHOADES    Relationship to patient: SELF    Best call back number: [phone]    Patient is needing:   PT C/O BURNING , ITCHING AND DISCHARGE    [facility]  [place]    OKAY TO CALL ANYTIME  OKAY FOR LVM

## 2022-12-29 ENCOUNTER — TELEPHONE (OUTPATIENT)
Dept: OBSTETRICS AND GYNECOLOGY | Facility: CLINIC | Age: 42
End: 2022-12-29
Payer: COMMERCIAL

## 2022-12-29 NOTE — TELEPHONE ENCOUNTER
Returned patients phone call. Patient reports that she went to her PCP a couple weeks ago and was told she had BV (which she reports she has frequent BV infections). Patient was given RX for Metronidazole PO for 7 days. Patient reports that the middle of last week, she was not feeling any better and went to [facility] and reports they gave her an antibiotic injection and sent her home. Patient reports that she is still having discharge that went from clear to a whitish color, having some vaginal irritation, and itching. Patient asking if she can try Metronidazole gel since her symptoms never cleared up along with Fluconazole. Patient reports she usually has both called in at the same time. Advised patient that I would speak with NP and call back with plan. [facility] [place].

## 2023-01-03 DIAGNOSIS — B37.9 YEAST INFECTION: ICD-10-CM

## 2023-01-03 DIAGNOSIS — B96.89 BV (BACTERIAL VAGINOSIS): Primary | ICD-10-CM

## 2023-01-03 DIAGNOSIS — N76.0 BV (BACTERIAL VAGINOSIS): Primary | ICD-10-CM

## 2023-01-03 RX ORDER — METRONIDAZOLE 7.5 MG/G
GEL VAGINAL DAILY
Qty: 1 EACH | Refills: 0 | Status: SHIPPED | OUTPATIENT
Start: 2023-01-03 | End: 2023-01-08

## 2023-01-03 RX ORDER — FLUCONAZOLE 150 MG/1
TABLET ORAL
Qty: 2 TABLET | Refills: 0 | Status: SHIPPED | OUTPATIENT
Start: 2023-01-03

## 2023-01-03 NOTE — TELEPHONE ENCOUNTER
Notified patient that per vane Aguilar to send in Metrogel and Diflucan. Patient notified and V/U

## 2023-01-20 DIAGNOSIS — K86.1 IDIOPATHIC CHRONIC PANCREATITIS: ICD-10-CM

## 2023-01-20 DIAGNOSIS — M79.7 FIBROMYALGIA: ICD-10-CM

## 2023-01-20 NOTE — TELEPHONE ENCOUNTER
She will have to go the the health department   received handoff from ELSIE Lund for break coverage report from Shannon ZIMMERMAN for cont care. Parent at bedside with er tech and . Antonino montesinos pending- report time approx 0830 this am. UA, VS and EKG pending. Child will be offered nourishment and hydration.

## 2023-01-23 RX ORDER — IBUPROFEN 800 MG/1
800 TABLET ORAL EVERY 8 HOURS PRN
Qty: 90 TABLET | Refills: 0 | OUTPATIENT
Start: 2023-01-23

## 2023-03-01 DIAGNOSIS — F41.9 ANXIETY: ICD-10-CM

## 2023-03-01 RX ORDER — IMIPRAMINE HCL 25 MG
TABLET ORAL
Qty: 90 TABLET | Refills: 3 | OUTPATIENT
Start: 2023-03-01

## 2023-03-24 DIAGNOSIS — K29.00 OTHER ACUTE GASTRITIS WITHOUT HEMORRHAGE: ICD-10-CM

## 2023-03-24 DIAGNOSIS — K21.00 GASTROESOPHAGEAL REFLUX DISEASE WITH ESOPHAGITIS WITHOUT HEMORRHAGE: ICD-10-CM

## 2023-03-24 DIAGNOSIS — M79.7 FIBROMYALGIA: ICD-10-CM

## 2023-03-24 RX ORDER — DEXLANSOPRAZOLE 60 MG/1
CAPSULE, DELAYED RELEASE ORAL
Qty: 90 CAPSULE | Refills: 3 | Status: SHIPPED | OUTPATIENT
Start: 2023-03-24

## 2023-03-24 RX ORDER — CYCLOBENZAPRINE HCL 10 MG
TABLET ORAL
Qty: 270 TABLET | Refills: 3 | Status: SHIPPED | OUTPATIENT
Start: 2023-03-24

## 2023-05-03 RX ORDER — LEVOCETIRIZINE DIHYDROCHLORIDE 5 MG/1
TABLET, FILM COATED ORAL
Qty: 30 TABLET | Refills: 5 | Status: SHIPPED | OUTPATIENT
Start: 2023-05-03

## 2023-08-16 ENCOUNTER — OFFICE VISIT (OUTPATIENT)
Dept: OBSTETRICS AND GYNECOLOGY | Facility: CLINIC | Age: 43
End: 2023-08-16
Payer: COMMERCIAL

## 2023-08-16 VITALS
HEIGHT: 66 IN | DIASTOLIC BLOOD PRESSURE: 80 MMHG | SYSTOLIC BLOOD PRESSURE: 116 MMHG | WEIGHT: 203 LBS | RESPIRATION RATE: 18 BRPM | BODY MASS INDEX: 32.62 KG/M2

## 2023-08-16 DIAGNOSIS — R10.2 PELVIC PAIN: Primary | ICD-10-CM

## 2023-08-16 DIAGNOSIS — Z11.3 SCREENING FOR STD (SEXUALLY TRANSMITTED DISEASE): ICD-10-CM

## 2023-08-16 DIAGNOSIS — N30.00 ACUTE CYSTITIS WITHOUT HEMATURIA: ICD-10-CM

## 2023-08-16 LAB
BILIRUB BLD-MCNC: NEGATIVE MG/DL
CLARITY, POC: ABNORMAL
COLOR UR: ABNORMAL
GLUCOSE UR STRIP-MCNC: NEGATIVE MG/DL
KETONES UR QL: NEGATIVE
LEUKOCYTE EST, POC: ABNORMAL
NITRITE UR-MCNC: NEGATIVE MG/ML
PH UR: 6 [PH] (ref 5–8)
PROT UR STRIP-MCNC: NEGATIVE MG/DL
RBC # UR STRIP: NEGATIVE /UL
SP GR UR: 1.02 (ref 1–1.03)
UROBILINOGEN UR QL: NORMAL
WET PREP GENITAL: NORMAL

## 2023-08-16 RX ORDER — NITROFURANTOIN 25; 75 MG/1; MG/1
100 CAPSULE ORAL 2 TIMES DAILY
Qty: 14 CAPSULE | Refills: 0 | Status: SHIPPED | OUTPATIENT
Start: 2023-08-16 | End: 2023-08-23

## 2023-08-16 RX ORDER — FLUCONAZOLE 150 MG/1
TABLET ORAL
Qty: 3 TABLET | Refills: 0 | Status: SHIPPED | OUTPATIENT
Start: 2023-08-16

## 2023-08-16 RX ORDER — DOXYCYCLINE HYCLATE 100 MG/1
CAPSULE ORAL
COMMUNITY
Start: 2023-04-25

## 2023-08-16 NOTE — PROGRESS NOTES
"            CC:  watery dc, STD testing      Subjective   HPI  Chencho Bah is a 43 y.o. female, , who presents for evaluation of discharge. The discharge is watery and clear.  Her symptoms have been present for 2 month(s).      She is currently sexually active. In the past year there has been ONE new sexual partner. Condoms  are always used but her ex  took the condom off during intercourse and since then she has been having discharge .  She would like to be screened for STD's at today's exam.    She inserted one applicatorful of Metrogel last week with no improvement.  She also began left over Flagyl x 3 days with questionable improvement.    Current birth control method: hysterectomy     Additional OB/GYN History   Last Pap : 2022  ASCUS: Negative HPV: Not done    OB History          4    Para   2    Term   2            AB   2    Living   2         SAB   2    IAB        Ectopic        Molar        Multiple        Live Births   2                The additional following portions of the patient's history were reviewed and updated as appropriate: allergies, current medications, past family history, past medical history, past social history, past surgical history, and problem list.    Review of Systems  All other systems reviewed and are negative.     I have reviewed and agree with the HPI, ROS, and historical information as entered above. Junie Eckert, APRN      Objective   /80   Resp 18   Ht 167.6 cm (65.98\")   Wt 92.1 kg (203 lb)   BMI 32.78 kg/mý     Physical Exam  Vitals and nursing note reviewed. Exam conducted with a chaperone present.   Constitutional:       General: She is not in acute distress.     Appearance: Normal appearance. She is not ill-appearing.   Pulmonary:      Effort: Pulmonary effort is normal. No respiratory distress.   Abdominal:      General: There is no distension.      Palpations: There is no mass.      Tenderness: There is no abdominal " tenderness. There is no guarding or rebound.      Hernia: No hernia is present.   Genitourinary:     General: Normal vulva.      Pubic Area: No rash.       Labia:         Right: No rash, tenderness, lesion or injury.         Left: No rash, tenderness, lesion or injury.       Vagina: Normal.      Uterus: Absent.       Adnexa: Right adnexa normal and left adnexa normal.      Comments: Small amt of vaginal medication noted.  No dc  Lymphadenopathy:      Lower Body: No right inguinal adenopathy. No left inguinal adenopathy.   Skin:     General: Skin is warm and dry.   Neurological:      Mental Status: She is alert and oriented to person, place, and time.   Psychiatric:         Mood and Affect: Mood normal.         Behavior: Behavior normal.       Wet mount performed? Yes.  Wnl.  CCUA--- small leuk    Assessment & Plan     Assessment and Plan    Problem List Items Addressed This Visit    None  Visit Diagnoses       Pelvic pain    -  Primary    Relevant Orders    Urine Culture - , Urine, Clean Catch    POC Wet Prep    POC Urinalysis Dipstick    Screening for STD (sexually transmitted disease)        Relevant Orders    Chlamydia trachomatis, Neisseria gonorrhoeae, Trichomonas vaginalis, PCR - Swab, Vagina    POC Wet Prep    Acute cystitis without hematuria        Relevant Medications    fluconazole (Diflucan) 150 MG tablet    Other Relevant Orders    POC Urinalysis Dipstick          D/w pt no sign of vaginal infection.  Possible UTI.  Will notify of urine culture and STD testing.  Erx Macrobid, Diflucan.  Push water.  Call prn symptoms continue after 3 days of Rx.        Junie Eckert, APRN  08/16/2023

## 2023-08-18 LAB
BACTERIA UR CULT: NORMAL
BACTERIA UR CULT: NORMAL
C TRACH RRNA SPEC QL NAA+PROBE: NEGATIVE
N GONORRHOEA RRNA SPEC QL NAA+PROBE: NEGATIVE
T VAGINALIS RRNA SPEC QL NAA+PROBE: NEGATIVE

## 2024-02-29 ENCOUNTER — OFFICE VISIT (OUTPATIENT)
Dept: OBSTETRICS AND GYNECOLOGY | Facility: CLINIC | Age: 44
End: 2024-02-29
Payer: COMMERCIAL

## 2024-02-29 VITALS
SYSTOLIC BLOOD PRESSURE: 106 MMHG | WEIGHT: 207 LBS | BODY MASS INDEX: 33.27 KG/M2 | DIASTOLIC BLOOD PRESSURE: 76 MMHG | RESPIRATION RATE: 18 BRPM | HEIGHT: 66 IN

## 2024-02-29 DIAGNOSIS — Z12.31 ENCOUNTER FOR SCREENING MAMMOGRAM FOR MALIGNANT NEOPLASM OF BREAST: ICD-10-CM

## 2024-02-29 DIAGNOSIS — B37.0 CANDIDA INFECTION, ORAL: ICD-10-CM

## 2024-02-29 DIAGNOSIS — B37.9 CANDIDA INFECTION: Primary | ICD-10-CM

## 2024-02-29 RX ORDER — IMIPRAMINE HCL 25 MG
1 TABLET ORAL DAILY
COMMUNITY

## 2024-02-29 RX ORDER — PROMETHAZINE HYDROCHLORIDE 25 MG/1
1 TABLET ORAL EVERY 6 HOURS PRN
COMMUNITY

## 2024-02-29 RX ORDER — ROSUVASTATIN CALCIUM 5 MG/1
TABLET, COATED ORAL
COMMUNITY
Start: 2024-02-02

## 2024-02-29 RX ORDER — HYDROXYZINE HYDROCHLORIDE 10 MG/1
TABLET, FILM COATED ORAL 3 TIMES DAILY
COMMUNITY

## 2024-02-29 RX ORDER — TRIAMCINOLONE ACETONIDE 1 MG/G
OINTMENT TOPICAL
COMMUNITY

## 2024-02-29 RX ORDER — CLOTRIMAZOLE AND BETAMETHASONE DIPROPIONATE 10; .64 MG/G; MG/G
CREAM TOPICAL
COMMUNITY

## 2024-02-29 RX ORDER — CLINDAMYCIN PHOSPHATE 10 MG/G
GEL TOPICAL
COMMUNITY

## 2024-02-29 NOTE — PROGRESS NOTES
Chief Complaint   Patient presents with    Vaginitis         Subjective   HPI  Chencho Bah is a 44 y.o. female, , who presents for evaluation of discharge and odor. The discharge is watery, mucousy, and white.  Her symptoms have been present for 3 week(s).  Additional she has noticed  none .    Prior to the onset of symptoms she was on antibiotics.  She has not recently changed soaps/detergents/toilet tissue.  Prior to this visit, she has used over the counter medication FEMICLEAR in an attempt to improve her symptoms.     Sexual History    She is not currently sexually active. In the past year she has not been sexually active. Condoms are not needed because she is not sexually active.  She would not like to be screened for STD's at today's exam.    Current birth control method:  patient had a hysterectomy .    Menstrual History:    No LMP recorded. Patient has had a hysterectomy.    In the past 6 months her cycles have been absent.   Her menstrual flow has been absent. Intermenstrual bleeding is absent.  Post-coital bleeding is absent.      Additional OB/GYN History   Last Pap :   Last Completed Pap Smear       This patient has no relevant Health Maintenance data.            OB History          4    Para   2    Term   2            AB   2    Living   2         SAB   2    IAB        Ectopic        Molar        Multiple        Live Births   2                The additional following portions of the patient's history were reviewed and updated as appropriate: allergies, current medications, past family history, past medical history, past social history, past surgical history, and problem list.    Review of Systems   HENT:          White tongue with mouth soreness and discomfort   Genitourinary:  Positive for vaginal discharge.     All other systems reviewed and are negative.     I have reviewed and agree with the HPI, ROS, and historical information as entered above. Akila QUIROZ  "Manley, APRN      Objective   /76   Resp 18   Ht 167.6 cm (65.98\")   Wt 93.9 kg (207 lb)   BMI 33.43 kg/m²     Physical Exam  Constitutional:       Appearance: Normal appearance.   HENT:      Mouth/Throat:      Comments: Entire tongue is coated with thick white substance  Pulmonary:      Effort: Pulmonary effort is normal.   Genitourinary:     General: Normal vulva.      Vagina: Normal. Vaginal discharge present.      Uterus: Absent.    Neurological:      Mental Status: She is alert.         Wet mount performed? Yes. Pertinent positives include: Yeast Buds    Assessment & Plan     Assessment and Plan    Problem List Items Addressed This Visit          Other    Candida infection, oral    Overview     Persistent oral candida confirmed with YAZ in office, culture sent. She has been on daily fluconazole and nystatin swish PO since 11/2021. She has seen PCP, ENT, dentist with no resolution. She reports she was on IV antifungals once and the issue resolved for a short time but quickly returned. HIV has been negative on multiple occasions per the pt, last documented is from 3/2023. HgBA1C was 6.1 eleven months ago. We discussed decreasing sugar in diet and returning to her PCP for re-evaluation and possible treatment to lower BG.         Relevant Medications    fluconazole (Diflucan) 150 MG tablet    Other Relevant Orders    Ambulatory Referral to Infectious Disease     Other Visit Diagnoses       Candida infection    -  Primary    Relevant Orders    Ambulatory Referral to Infectious Disease    Yeast Culture - Reference - Swab, Tongue    Bacterial Vaginosis, MARCK - Swab, Cervix    Candida panel, PCR - Swab, Vagina    Encounter for screening mammogram for malignant neoplasm of breast        Relevant Orders    Mammo Screening Digital Tomosynthesis Bilateral With CAD          KOH-+yeast buds orally and vaginally. She is already taking daily fluconazole.     NuSwab ordered  Medication(s) ordered  Terazol cream, 1 " refill.   Refer to infectious disease for persistent oral candida despite daily nystatin swish and fluconazole. Oral yeast culture sent.   RTO for annual  Mammogram ordered today        Akila Manley, APRN  02/29/2024

## 2024-03-01 ENCOUNTER — TELEPHONE (OUTPATIENT)
Dept: OBSTETRICS AND GYNECOLOGY | Facility: CLINIC | Age: 44
End: 2024-03-01
Payer: COMMERCIAL

## 2024-03-01 NOTE — TELEPHONE ENCOUNTER
INFECTIOUS DISEASE REFERRAL ROUTED BACK: Please be advised that the referral for this patient has been reviewed and is denied.  It looks like the patient has been seen by [facility] and allergy and immunology.  Per the ENT no thrush was apparent on the physical exam.  Allergy and immunology also thought that the etiology of her tongue abnormality was due to something else than thrush.  She was referred for consideration of a tongue biopsy.  At this time there is nothing further to add from an infectious disease standpoint.  We would recommend the patient continue to follow-up with those 2 specialists      PLEASE ADVISE ON NEXT STEPS

## 2024-03-09 LAB
A VAGINAE DNA VAG QL NAA+PROBE: NORMAL SCORE
BVAB2 DNA VAG QL NAA+PROBE: NORMAL SCORE
C ALBICANS DNA VAG QL NAA+PROBE: NEGATIVE
C GLABRATA DNA VAG QL NAA+PROBE: NEGATIVE
C KRUSEI DNA VAG QL NAA+PROBE: NEGATIVE
C LUSITANIAE DNA VAG QL NAA+PROBE: NEGATIVE
CANDIDA DNA VAG QL NAA+PROBE: NEGATIVE
MEGA1 DNA VAG QL NAA+PROBE: NORMAL SCORE
YEAST SPEC QL CULT: ABNORMAL

## 2024-03-11 RX ORDER — IBREXAFUNGERP 150 MG/1
300 TABLET, FILM COATED ORAL EVERY 12 HOURS
Qty: 4 TABLET | Refills: 0 | Status: SHIPPED | OUTPATIENT
Start: 2024-03-11 | End: 2024-03-12

## 2024-03-26 ENCOUNTER — TELEPHONE (OUTPATIENT)
Dept: OBSTETRICS AND GYNECOLOGY | Facility: CLINIC | Age: 44
End: 2024-03-26
Payer: COMMERCIAL

## 2024-03-26 NOTE — TELEPHONE ENCOUNTER
Sonu calling to follow up on PA for Brexafemme. Pt has Candida Galbrata of the mouth and that does not respond to Diflucan. Pending. Update: Approved today  The request has been approved. The authorization is effective for a maximum of 1 fills from 03/26/2024 to 04/25/2024 Pharmacy notified - zero copay they have to order it.

## 2024-03-30 ENCOUNTER — HOSPITAL ENCOUNTER (EMERGENCY)
Facility: HOSPITAL | Age: 44
Discharge: LEFT BEFORE DISCHARGE | End: 2024-03-30
Attending: EMERGENCY MEDICINE
Payer: COMMERCIAL

## 2024-03-30 VITALS
SYSTOLIC BLOOD PRESSURE: 130 MMHG | TEMPERATURE: 98.5 F | DIASTOLIC BLOOD PRESSURE: 91 MMHG | HEIGHT: 66 IN | RESPIRATION RATE: 20 BRPM | WEIGHT: 204 LBS | HEART RATE: 105 BPM | BODY MASS INDEX: 32.78 KG/M2 | OXYGEN SATURATION: 97 %

## 2024-03-30 DIAGNOSIS — R30.0 DYSURIA: Primary | ICD-10-CM

## 2024-03-30 LAB
ALBUMIN SERPL-MCNC: 4.5 G/DL (ref 3.5–5.2)
ALBUMIN/GLOB SERPL: 1.7 G/DL
ALP SERPL-CCNC: 132 U/L (ref 39–117)
ALT SERPL W P-5'-P-CCNC: 20 U/L (ref 1–33)
ANION GAP SERPL CALCULATED.3IONS-SCNC: 11 MMOL/L (ref 5–15)
AST SERPL-CCNC: 20 U/L (ref 1–32)
BASOPHILS # BLD AUTO: 0.05 10*3/MM3 (ref 0–0.2)
BASOPHILS NFR BLD AUTO: 0.7 % (ref 0–1.5)
BILIRUB SERPL-MCNC: 0.2 MG/DL (ref 0–1.2)
BILIRUB UR QL STRIP: NEGATIVE
BUN SERPL-MCNC: 15 MG/DL (ref 6–20)
BUN/CREAT SERPL: 15.2 (ref 7–25)
CALCIUM SPEC-SCNC: 9.4 MG/DL (ref 8.6–10.5)
CHLORIDE SERPL-SCNC: 103 MMOL/L (ref 98–107)
CLARITY UR: CLEAR
CO2 SERPL-SCNC: 25 MMOL/L (ref 22–29)
COLOR UR: YELLOW
CREAT SERPL-MCNC: 0.99 MG/DL (ref 0.57–1)
DEPRECATED RDW RBC AUTO: 40.8 FL (ref 37–54)
EGFRCR SERPLBLD CKD-EPI 2021: 72.3 ML/MIN/1.73
EOSINOPHIL # BLD AUTO: 0.14 10*3/MM3 (ref 0–0.4)
EOSINOPHIL NFR BLD AUTO: 2.1 % (ref 0.3–6.2)
ERYTHROCYTE [DISTWIDTH] IN BLOOD BY AUTOMATED COUNT: 14.1 % (ref 12.3–15.4)
GLOBULIN UR ELPH-MCNC: 2.7 GM/DL
GLUCOSE SERPL-MCNC: 121 MG/DL (ref 65–99)
GLUCOSE UR STRIP-MCNC: NEGATIVE MG/DL
HCT VFR BLD AUTO: 38.2 % (ref 34–46.6)
HGB BLD-MCNC: 12.3 G/DL (ref 12–15.9)
HGB UR QL STRIP.AUTO: NEGATIVE
HOLD SPECIMEN: NORMAL
IMM GRANULOCYTES # BLD AUTO: 0.01 10*3/MM3 (ref 0–0.05)
IMM GRANULOCYTES NFR BLD AUTO: 0.1 % (ref 0–0.5)
KETONES UR QL STRIP: NEGATIVE
LEUKOCYTE ESTERASE UR QL STRIP.AUTO: NEGATIVE
LIPASE SERPL-CCNC: 27 U/L (ref 13–60)
LYMPHOCYTES # BLD AUTO: 3.22 10*3/MM3 (ref 0.7–3.1)
LYMPHOCYTES NFR BLD AUTO: 47.2 % (ref 19.6–45.3)
MCH RBC QN AUTO: 25.5 PG (ref 26.6–33)
MCHC RBC AUTO-ENTMCNC: 32.2 G/DL (ref 31.5–35.7)
MCV RBC AUTO: 79.1 FL (ref 79–97)
MONOCYTES # BLD AUTO: 0.54 10*3/MM3 (ref 0.1–0.9)
MONOCYTES NFR BLD AUTO: 7.9 % (ref 5–12)
NEUTROPHILS NFR BLD AUTO: 2.86 10*3/MM3 (ref 1.7–7)
NEUTROPHILS NFR BLD AUTO: 42 % (ref 42.7–76)
NITRITE UR QL STRIP: NEGATIVE
NRBC BLD AUTO-RTO: 0 /100 WBC (ref 0–0.2)
PH UR STRIP.AUTO: 6 [PH] (ref 5–8)
PLATELET # BLD AUTO: 379 10*3/MM3 (ref 140–450)
PMV BLD AUTO: 9 FL (ref 6–12)
POTASSIUM SERPL-SCNC: 3.9 MMOL/L (ref 3.5–5.2)
PROT SERPL-MCNC: 7.2 G/DL (ref 6–8.5)
PROT UR QL STRIP: NEGATIVE
RBC # BLD AUTO: 4.83 10*6/MM3 (ref 3.77–5.28)
SODIUM SERPL-SCNC: 139 MMOL/L (ref 136–145)
SP GR UR STRIP: 1.02 (ref 1–1.03)
UROBILINOGEN UR QL STRIP: NORMAL
WBC NRBC COR # BLD AUTO: 6.82 10*3/MM3 (ref 3.4–10.8)
WHOLE BLOOD HOLD COAG: NORMAL
WHOLE BLOOD HOLD SPECIMEN: NORMAL

## 2024-03-30 PROCEDURE — 99283 EMERGENCY DEPT VISIT LOW MDM: CPT

## 2024-03-30 PROCEDURE — 96375 TX/PRO/DX INJ NEW DRUG ADDON: CPT

## 2024-03-30 PROCEDURE — 85025 COMPLETE CBC W/AUTO DIFF WBC: CPT | Performed by: EMERGENCY MEDICINE

## 2024-03-30 PROCEDURE — 25010000002 DIPHENHYDRAMINE PER 50 MG: Performed by: EMERGENCY MEDICINE

## 2024-03-30 PROCEDURE — 96374 THER/PROPH/DIAG INJ IV PUSH: CPT

## 2024-03-30 PROCEDURE — 83690 ASSAY OF LIPASE: CPT | Performed by: EMERGENCY MEDICINE

## 2024-03-30 PROCEDURE — 80053 COMPREHEN METABOLIC PANEL: CPT | Performed by: EMERGENCY MEDICINE

## 2024-03-30 PROCEDURE — 25010000002 KETOROLAC TROMETHAMINE PER 15 MG: Performed by: EMERGENCY MEDICINE

## 2024-03-30 PROCEDURE — 81003 URINALYSIS AUTO W/O SCOPE: CPT | Performed by: EMERGENCY MEDICINE

## 2024-03-30 RX ORDER — DIPHENHYDRAMINE HYDROCHLORIDE 50 MG/ML
25 INJECTION INTRAMUSCULAR; INTRAVENOUS ONCE
Status: COMPLETED | OUTPATIENT
Start: 2024-03-30 | End: 2024-03-30

## 2024-03-30 RX ORDER — KETOROLAC TROMETHAMINE 30 MG/ML
30 INJECTION, SOLUTION INTRAMUSCULAR; INTRAVENOUS ONCE
Status: COMPLETED | OUTPATIENT
Start: 2024-03-30 | End: 2024-03-30

## 2024-03-30 RX ORDER — SODIUM CHLORIDE 9 MG/ML
10 INJECTION, SOLUTION INTRAMUSCULAR; INTRAVENOUS; SUBCUTANEOUS AS NEEDED
Status: DISCONTINUED | OUTPATIENT
Start: 2024-03-30 | End: 2024-03-30 | Stop reason: HOSPADM

## 2024-03-30 RX ADMIN — KETOROLAC TROMETHAMINE 30 MG: 30 INJECTION, SOLUTION INTRAMUSCULAR; INTRAVENOUS at 19:52

## 2024-03-30 RX ADMIN — DIPHENHYDRAMINE HYDROCHLORIDE 25 MG: 50 INJECTION, SOLUTION INTRAMUSCULAR; INTRAVENOUS at 19:53

## 2024-03-31 NOTE — ED PROVIDER NOTES
"Subjective   History of Present Illness  Is a 44-year-old female with past medical history of anxiety and hypertension presented to the emergency department with dysuria.  Patient states that she is longstanding history of urinary tract infections.  States that frequently.  Patient states that she has had some burning with urination.  She thinks it is getting to her blood sugar because she just does not feel well.  She feels very fatigued.  She is not complaining of any associate abdominal discomfort.  No vomiting or diarrhea.  Denies any fevers or chills.  Headache or change in vision or focal weakness pain or chest pain or shortness of breath.    History provided by:  Patient   used: No        Review of Systems   Constitutional:  Negative for chills and fever.   HENT:  Negative for congestion, ear pain and sore throat.    Eyes:  Negative for visual disturbance.   Respiratory:  Negative for shortness of breath.    Cardiovascular:  Negative for chest pain.   Gastrointestinal:  Negative for abdominal pain.   Genitourinary:  Positive for dysuria. Negative for difficulty urinating.   Musculoskeletal:  Negative for arthralgias.   Skin:  Negative for rash.   Neurological:  Negative for dizziness, weakness and numbness.   Psychiatric/Behavioral:  Negative for agitation.        Past Medical History:   Diagnosis Date    Acute nonintractable headache     nonspcecified headache type     Acute otitis externa of left ear     Acute otitis media     Allergic     Anxiety     Arthritis     Back pain     Blurry vision     Contact dermatitis due to poison ivy     Depression     Diarrhea     Dizziness     Edema     Factor V Leiden mutation     \"detention\"    Fatigue     Flatulence     Fractures, compound     h/o     Gastritis     Gastroenteritis     GERD (gastroesophageal reflux disease)     H/O blood clots     Hair loss     Hidradenitis     History of cardiac disorder     History of kidney infection     History of " seizure disorder     History of seizures     Hypertension     Joint pain, hip     Low serum vitamin D     Miscarriage     Nausea     Otalgia of left ear     Pancreatitis     Rash     Renal calculi     personal h/o     Screening breast examination     admits    Speech complaints     Vitamin D deficiency        Allergies   Allergen Reactions    Lamotrigine Rash     Do not re-challenge medication. Possible Jered-Estrada's.        Past Surgical History:   Procedure Laterality Date    CHOLECYSTECTOMY      COLONOSCOPY      HIP SURGERY      multiple since 5th grade x 12 replacements as well     TOTAL ABDOMINAL HYSTERECTOMY WITH SALPINGO OOPHORECTOMY Bilateral 2005    menometorrhagia    TUBAL ABDOMINAL LIGATION      UPPER GASTROINTESTINAL ENDOSCOPY         Family History   Problem Relation Age of Onset    Arthritis Mother     Depression Mother     Diabetes Mother     Heart disease Mother     Hyperlipidemia Mother     Hypertension Mother     Stroke Mother     Arthritis Father     Depression Father     Hypertension Father     Arthritis Maternal Grandmother     Cancer Maternal Grandmother     Depression Maternal Grandmother     Diabetes Maternal Grandmother     Depression Maternal Grandfather     Arthritis Paternal Grandmother     Depression Paternal Grandmother     Depression Paternal Grandfather     Asthma Daughter     Depression Daughter     Asthma Son     Depression Son     Arthritis Other     Mental illness Other     Breast cancer Neg Hx     Ovarian cancer Neg Hx     Uterine cancer Neg Hx        Social History     Socioeconomic History    Marital status:    Tobacco Use    Smoking status: Every Day     Current packs/day: 1.00     Types: Cigarettes    Smokeless tobacco: Never   Vaping Use    Vaping status: Never Used   Substance and Sexual Activity    Alcohol use: Not Currently    Drug use: Yes     Types: Marijuana    Sexual activity: Not Currently     Birth control/protection: Tubal ligation           Objective    Physical Exam  Vitals and nursing note reviewed.   Constitutional:       General: She is not in acute distress.     Appearance: She is not ill-appearing or toxic-appearing.   HENT:      Mouth/Throat:      Pharynx: No posterior oropharyngeal erythema.   Eyes:      Conjunctiva/sclera: Conjunctivae normal.      Pupils: Pupils are equal, round, and reactive to light.   Cardiovascular:      Rate and Rhythm: Regular rhythm. Tachycardia present.   Pulmonary:      Effort: Pulmonary effort is normal. No respiratory distress.   Abdominal:      General: Abdomen is flat. There is no distension.      Palpations: There is no mass.      Tenderness: There is no abdominal tenderness. There is no guarding or rebound.   Musculoskeletal:         General: No deformity. Normal range of motion.   Skin:     General: Skin is warm.      Findings: No rash.   Neurological:      General: No focal deficit present.      Mental Status: She is alert and oriented to person, place, and time.      Motor: No weakness.         Procedures           ED Course  ED Course as of 03/30/24 2040   Sat Mar 30, 2024   2039 BP: 130/91 [JK]   2039 Temp: 98.5 °F (36.9 °C) [JK]   2039 Temp src: Oral [JK]   2039 Heart Rate: 103 [JK]   2039 Resp: 20 [JK]   2039 SpO2: 99 %  Interpretation:  Patient's repeat vitals, telemetry tracing, and pulse oximetry tracing were directly viewed and interpreted by myself.  Sinus tachycardia [JK]   2039 CBC & Differential(!) [JK]   2039 Comprehensive Metabolic Panel(!) [JK]   2039 Lipase [JK]   2039 Urinalysis With Microscopic If Indicated (No Culture) - Urine, Clean Catch  Interpretation:  Laboratory studies were reviewed and interpreted directly by myself.  CBC was unremarkable, CMP normal, lipase normal, urinalysis normal [JK]   2040 Upon going to reevaluate the patient, the nurse informed me the patient decided to leave without completing treatment. Although their full medical workup could not be completed, the patient proceeded  to leave. The patient was not given the details of their results or plan for medical management. [JK]      ED Course User Index  [JK] Han Lynne MD                                             Medical Decision Making  This is a 44-year-old female presented to the emergency department with some dysuria and fatigue.  Patient has some concerns for urinary tract infection.  She thinks that it is extending into her bloodstream.  Overall the patient appears nontoxic.  She is afebrile.  Is no evidence of acute abdomen or peritonitis on examination.  IV access will be established and the patient.  Placed on continuous telemetry monitoring.  Provided symptomatic treatment.  Workup initiated.      Differential diagnosis: UTI, acute cystitis, dysuria, acute kidney injury, electrolyte abnormality      Amount and/or Complexity of Data Reviewed  External Data Reviewed: labs, radiology and notes.     Details: External laboratories, imaging as well as notes were reviewed personally by myself.  All relevant studies were used to guide decision making.     Date of previous record: 2/12/2024    Source of note: Primary care physician    Summary: Patient was seen for evaluation of chronic symptoms.  I did review basic laboratory studies on file as well as a previous urinalysis, urine culture and chest x-ray.  Records reviewed    Labs: ordered. Decision-making details documented in ED Course.    Risk  Prescription drug management.        Final diagnoses:   Dysuria       ED Disposition  ED Disposition       ED Disposition   AMA    Condition   --    Comment   --               Gregg Joyner MD  [address] [place] [address]  [phone]    Call in 1 day           Medication List      No changes were made to your prescriptions during this visit.            Han Lynne MD  03/30/24 9853

## 2024-04-08 ENCOUNTER — TELEPHONE (OUTPATIENT)
Dept: OBSTETRICS AND GYNECOLOGY | Facility: CLINIC | Age: 44
End: 2024-04-08

## 2024-04-08 ENCOUNTER — OFFICE VISIT (OUTPATIENT)
Dept: OBSTETRICS AND GYNECOLOGY | Facility: CLINIC | Age: 44
End: 2024-04-08
Payer: COMMERCIAL

## 2024-04-08 VITALS
RESPIRATION RATE: 18 BRPM | DIASTOLIC BLOOD PRESSURE: 78 MMHG | HEIGHT: 66 IN | SYSTOLIC BLOOD PRESSURE: 106 MMHG | BODY MASS INDEX: 32.3 KG/M2 | WEIGHT: 201 LBS

## 2024-04-08 DIAGNOSIS — R30.0 DYSURIA: Primary | ICD-10-CM

## 2024-04-08 LAB
BILIRUB BLD-MCNC: NEGATIVE MG/DL
CLARITY, POC: CLEAR
COLOR UR: YELLOW
GLUCOSE UR STRIP-MCNC: NEGATIVE MG/DL
KETONES UR QL: NEGATIVE
LEUKOCYTE EST, POC: NEGATIVE
NITRITE UR-MCNC: NEGATIVE MG/ML
PH UR: 6 [PH] (ref 5–8)
PROT UR STRIP-MCNC: ABNORMAL MG/DL
RBC # UR STRIP: NEGATIVE /UL
SP GR UR: 1.02 (ref 1–1.03)
UROBILINOGEN UR QL: NORMAL

## 2024-04-08 PROCEDURE — 99213 OFFICE O/P EST LOW 20 MIN: CPT | Performed by: NURSE PRACTITIONER

## 2024-04-08 RX ORDER — CROMOLYN SODIUM 20 MG/2ML
INHALANT INTRABRONCHIAL
COMMUNITY
Start: 2024-03-20

## 2024-04-08 NOTE — PROGRESS NOTES
Chief Complaint   Patient presents with    Vaginitis         Subjective   HPI  Sha Mansoor Bah is a 44 y.o. female, , who presents for evaluation of discharge, urinary symptoms of dysuria and urinary frequency, and vulvar itching. The discharge is watery and clear.  Her symptoms have been present for 4 day(s).  Additional she would like her throat looked at to see if she has HPV on her throat. She would like be tested for HPV. She would like her blood tested for yeast as well.  Additionally she notes that she has been swallowing cromolyn, she thought it was prescribed by our office to treat her yeast.      Prior to the onset of symptoms she was on antibiotics.  She has not recently changed soaps/detergents/toilet tissue.  Prior to this visit, she has used brexafemme and fluconazole in an attempt to improve her symptoms. Patient stated it did not help    Sexual History    She is not currently sexually active. In the past year she has not been sexually active. Condoms are not needed because she is not sexually active.  She would not like to be screened for STD's at today's exam.    Current birth control method:  patient had a hysterectomy  .    Menstrual History:    No LMP recorded. Patient has had a hysterectomy.    In the past 6 months her cycles have been absent.       Additional OB/GYN History   Last Pap :   Last Completed Pap Smear       This patient has no relevant Health Maintenance data.            OB History          4    Para   2    Term   2            AB   2    Living   2         SAB   2    IAB        Ectopic        Molar        Multiple        Live Births   2                The additional following portions of the patient's history were reviewed and updated as appropriate: allergies, current medications, past family history, past medical history, past social history, past surgical history, and problem list.    Review of Systems   Constitutional: Negative.   "  Respiratory: Negative.     Cardiovascular: Negative.    Gastrointestinal: Negative.    Genitourinary:  Positive for dysuria and vaginal discharge.     All other systems reviewed and are negative.     I have reviewed and agree with the HPI, ROS, and historical information as entered above. Akila QUIROZ Sindhu, APRN      Objective   /78   Resp 18   Ht 167.6 cm (65.98\")   Wt 91.2 kg (201 lb)   BMI 32.46 kg/m²     Physical Exam  Constitutional:       Appearance: Normal appearance.   Pulmonary:      Effort: Pulmonary effort is normal.   Neurological:      Mental Status: She is alert.         Wet mount performed? No. Pt declined vaginal exam today    Assessment & Plan     Assessment and Plan    Problem List Items Addressed This Visit    None  Visit Diagnoses       Dysuria    -  Primary    Relevant Orders    Urine Culture - Urine, Urine, Clean Catch    POC Urinalysis Dipstick (Completed)          CCUA-negative. Urine culture sent. She declines a vaginal exam today despite her vaginal complaints.  She is still having problems with oral yeast (culture proven candida glabrata), the PA was approved for Brexafemme and the pharmacy has ordered it for her. She does not want to go back to the ENT at , she has seen an ENT for an ear problem and she will ask her PCP to refer back to her for treatment of the yeast in her mouth. They can discuss her concern for HPV of the throat, we do not test for that orally here. I attempted a PA to [facility] x 2 and it was declined because she was seen at  ENT for the issue and they deferred back to them.   I advised her that I did not sent in the cromolyn and that it is not intended to be ingested. I advised her to stop that immediately and she reports that she has stopped.  We did also notify the office of prescribing provider (Elizabeth Mentzer with allergy/immunology) that she had been taking this incorrectly.      Urine culture sent  Complete brexafemme treatment  She intends " to ask her PCP for a referral to the ENT of her choice        Akila Manley, APRN  04/08/2024

## 2024-04-08 NOTE — TELEPHONE ENCOUNTER
[facility] called in regards to patients rx for brexafemme.  They stated that it was on back order, and are unsure of if/when it will be available.  Patient coming in for appt with Christiano today

## 2024-04-17 ENCOUNTER — TELEPHONE (OUTPATIENT)
Dept: OBSTETRICS AND GYNECOLOGY | Facility: CLINIC | Age: 44
End: 2024-04-17
Payer: COMMERCIAL

## 2024-04-17 ENCOUNTER — TELEPHONE (OUTPATIENT)
Dept: GASTROENTEROLOGY | Facility: CLINIC | Age: 44
End: 2024-04-17

## 2024-04-17 NOTE — TELEPHONE ENCOUNTER
Hub staff attempted to follow warm transfer process and was unsuccessful     Caller: Ole Bah    Relationship to patient: Self    Best call back number: [phone]    Patient is needing: PT CALLED TO SCHEDULE PROCEDURE FROM REFERRAL// PT STATES THAT IT IS OKAY FOR THE OFFICE TO GO AHEAD AND SCHEDULE THE APPT AND CALL HER TO TELL HER WHEN THE APPT IS// OK TO LEAVE A DETAILED VM// PLEASE CALL PT BACK

## 2024-04-17 NOTE — TELEPHONE ENCOUNTER
"Pt called the office. States [facility]emme Rx to was unable to fill it. Pt asking if it can be sent to [facility] in [place].     Brexafemme order . Per FANI Manley's note on 24: \"Complete brexafemme treatment\".    Will forward encounter for FANI Manley to reorder.   "

## 2024-04-18 RX ORDER — IBREXAFUNGERP 150 MG/1
300 TABLET, FILM COATED ORAL EVERY 12 HOURS
Qty: 4 TABLET | Refills: 0 | Status: SHIPPED | OUTPATIENT
Start: 2024-04-18 | End: 2024-04-19

## 2024-04-19 DIAGNOSIS — K29.00 OTHER ACUTE GASTRITIS WITHOUT HEMORRHAGE: ICD-10-CM

## 2024-04-19 DIAGNOSIS — K21.00 GASTROESOPHAGEAL REFLUX DISEASE WITH ESOPHAGITIS WITHOUT HEMORRHAGE: ICD-10-CM

## 2024-04-22 RX ORDER — DEXLANSOPRAZOLE 60 MG/1
CAPSULE, DELAYED RELEASE ORAL
Qty: 90 CAPSULE | Refills: 3 | OUTPATIENT
Start: 2024-04-22

## 2024-04-24 RX ORDER — PEG-3350, SODIUM SULFATE, SODIUM CHLORIDE, POTASSIUM CHLORIDE, SODIUM ASCORBATE AND ASCORBIC ACID 7.5-2.691G
KIT ORAL
Qty: 1 EACH | Refills: 0 | Status: SHIPPED | OUTPATIENT
Start: 2024-04-24

## 2024-05-06 ENCOUNTER — OFFICE VISIT (OUTPATIENT)
Dept: FAMILY MEDICINE CLINIC | Facility: CLINIC | Age: 44
End: 2024-05-06
Payer: COMMERCIAL

## 2024-05-06 VITALS
OXYGEN SATURATION: 99 % | SYSTOLIC BLOOD PRESSURE: 110 MMHG | RESPIRATION RATE: 14 BRPM | BODY MASS INDEX: 32.78 KG/M2 | DIASTOLIC BLOOD PRESSURE: 68 MMHG | HEART RATE: 72 BPM | WEIGHT: 204 LBS | TEMPERATURE: 96.9 F | HEIGHT: 66 IN

## 2024-05-06 DIAGNOSIS — R73.03 PREDIABETES: ICD-10-CM

## 2024-05-06 DIAGNOSIS — M79.7 FIBROMYALGIA: ICD-10-CM

## 2024-05-06 DIAGNOSIS — B37.9 CANDIDA GLABRATA INFECTION: ICD-10-CM

## 2024-05-06 DIAGNOSIS — R35.0 FREQUENT URINATION: Primary | ICD-10-CM

## 2024-05-06 PROCEDURE — 1159F MED LIST DOCD IN RCRD: CPT | Performed by: NURSE PRACTITIONER

## 2024-05-06 PROCEDURE — 1160F RVW MEDS BY RX/DR IN RCRD: CPT | Performed by: NURSE PRACTITIONER

## 2024-05-06 PROCEDURE — 99214 OFFICE O/P EST MOD 30 MIN: CPT | Performed by: NURSE PRACTITIONER

## 2024-05-06 PROCEDURE — 1125F AMNT PAIN NOTED PAIN PRSNT: CPT | Performed by: NURSE PRACTITIONER

## 2024-05-06 RX ORDER — NAPROXEN 500 MG/1
500 TABLET ORAL 2 TIMES DAILY WITH MEALS
Qty: 20 TABLET | Refills: 0 | Status: SHIPPED | OUTPATIENT
Start: 2024-05-06 | End: 2024-05-16

## 2024-05-06 RX ORDER — FLUCYTOSINE 500 MG/1
500 CAPSULE ORAL 4 TIMES DAILY
Qty: 56 CAPSULE | Refills: 0 | Status: SHIPPED | OUTPATIENT
Start: 2024-05-06 | End: 2024-05-20

## 2024-05-06 RX ORDER — CYCLOBENZAPRINE HCL 10 MG
10 TABLET ORAL 2 TIMES DAILY PRN
Qty: 20 TABLET | Refills: 0 | Status: SHIPPED | OUTPATIENT
Start: 2024-05-06 | End: 2024-05-16

## 2024-05-06 NOTE — PROGRESS NOTES
"       Date: 2024   Patient Name: Chencho Bah  : 1980   MRN: [id number]     Chief Complaint:    Chief Complaint   Patient presents with    Illness     Rare yeast infection for 4 years. It's called Candida Glabrata. It's everywhere pt states. Previously treated by Dr. Joyner, PCP, her dentist, OB/GYN, allergist & ENT for this. She says she's going in circles and getting no help. She has no outside records with her today.        History of Present Illness: Chencho Bah is a 44 y.o. 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 She was treated with appropriate treatment with Brexafemme prescribed by Akila LEZAMA back in 2024. She is having suprapubic pain today and urinary frequency an urgency and her mouth and back are hurting as well. She has a colonoscopy tomorrow, Dr Casper.     Illness             Review of Systems:   Review of Systems    Past Medical History:   Past Medical History:   Diagnosis Date    Acute nonintractable headache     nonspcecified headache type     Acute otitis externa of left ear     Acute otitis media     Allergic     Anxiety     Arthritis     Back pain     Blurry vision     Contact dermatitis due to poison ivy     Depression     Diarrhea     Dizziness     Edema     Factor V Leiden mutation     \"FDC\"    Fatigue     Flatulence     Fractures, compound     h/o     Gastritis     Gastroenteritis     GERD (gastroesophageal reflux disease)     H/O blood clots     Hair loss     Hidradenitis     History of cardiac disorder     History of kidney infection     History of seizure disorder     History of seizures     Hypertension     Joint pain, hip     Low serum vitamin D     Miscarriage     Nausea     Otalgia of left ear     Pancreatitis     Rash     Renal calculi     personal h/o     Screening breast examination     admits    Speech complaints     Vitamin D deficiency        Past Surgical History: "   Past Surgical History:   Procedure Laterality Date    CHOLECYSTECTOMY      COLONOSCOPY      HIP SURGERY      multiple since 5th grade x 12 replacements as well     TOTAL ABDOMINAL HYSTERECTOMY WITH SALPINGO OOPHORECTOMY Bilateral 2005    menometorrhagia    TUBAL ABDOMINAL LIGATION      UPPER GASTROINTESTINAL ENDOSCOPY         Family History:   Family History   Problem Relation Age of Onset    Arthritis Mother     Depression Mother     Diabetes Mother     Heart disease Mother     Hyperlipidemia Mother     Hypertension Mother     Stroke Mother     Arthritis Father     Depression Father     Hypertension Father     Arthritis Maternal Grandmother     Cancer Maternal Grandmother     Depression Maternal Grandmother     Diabetes Maternal Grandmother     Depression Maternal Grandfather     Arthritis Paternal Grandmother     Depression Paternal Grandmother     Depression Paternal Grandfather     Asthma Daughter     Depression Daughter     Asthma Son     Depression Son     Arthritis Other     Mental illness Other     Breast cancer Neg Hx     Ovarian cancer Neg Hx     Uterine cancer Neg Hx        Social History:   Social History     Socioeconomic History    Marital status:    Tobacco Use    Smoking status: Every Day     Current packs/day: 1.00     Types: Cigarettes    Smokeless tobacco: Never   Vaping Use    Vaping status: Never Used   Substance and Sexual Activity    Alcohol use: Not Currently    Drug use: Yes     Types: Marijuana    Sexual activity: Not Currently     Birth control/protection: Tubal ligation       Medications:     Current Outpatient Medications:     amLODIPine (NORVASC) 5 MG tablet, Take 1 tablet by mouth Daily., Disp: , Rfl:     B-12 Methylcobalamin 1000 MCG tablet dispersible, Place 1,000 mg on the tongue Daily., Disp: 90 tablet, Rfl: 3    chlorhexidine (HIBICLENS) 4 % external liquid, Apply  topically to the appropriate area as directed Daily As Needed for Wound Care., Disp: 236 mL, Rfl: 2     clindamycin 1 % gel, , Disp: , Rfl:     cloNIDine (CATAPRES) 0.1 MG tablet, Take 1 tablet by mouth 3 (Three) Times a Day As Needed for High Blood Pressure., Disp: 90 tablet, Rfl: 3    clotrimazole-betamethasone (LOTRISONE) 1-0.05 % cream, APPLY 1-2 TIMES DAILY TO AFFECTED AREA, Disp: , Rfl:     Creon 28686-173771 units capsule delayed-release particles capsule, TAKE 1 CAPSULE BY MOUTH TWICE A DAY WITH MEALS, Disp: 180 capsule, Rfl: 3    cyclobenzaprine (FLEXERIL) 10 MG tablet, Take 1 tablet by mouth 2 (Two) Times a Day As Needed for Muscle Spasms for up to 10 days., Disp: 20 tablet, Rfl: 0    dexlansoprazole (DEXILANT) 60 MG capsule, TAKE 1 CAPSULE BY MOUTH EVERY DAY, Disp: 90 capsule, Rfl: 3    dicyclomine (BENTYL) 20 MG tablet, TAKE 1 TABLET BY MOUTH 4 TIMES A DAY., Disp: 120 tablet, Rfl: 5    ergocalciferol (ERGOCALCIFEROL) 1.25 MG (08452 UT) capsule, Take 1 capsule by mouth 1 (One) Time Per Week., Disp: 5 capsule, Rfl: 11    hydrOXYzine pamoate (VISTARIL) 100 MG capsule, Take 1 capsule by mouth 3 (Three) Times a Day As Needed for Itching or Anxiety (For panic attacks)., Disp: 60 capsule, Rfl: 1    ibuprofen (ADVIL,MOTRIN) 800 MG tablet, TAKE 1 TABLET BY MOUTH EVERY 8 (EIGHT) HOURS AS NEEDED FOR MILD PAIN ., Disp: 90 tablet, Rfl: 0    imipramine (TOFRANIL) 25 MG tablet, Take 1 tablet by mouth Daily., Disp: , Rfl:     levocetirizine (XYZAL) 5 MG tablet, TAKE 1 TABLET BY MOUTH EVERY DAY IN THE EVENING, Disp: 30 tablet, Rfl: 5    Multiple Vitamin (THERA-TABS) tablet, Take 1 tablet by mouth Every Morning., Disp: , Rfl: 0    mupirocin (BACTROBAN) 2 % ointment, , Disp: , Rfl:     nystatin (MYCOSTATIN) 100,000 unit/mL suspension, Take 5 mL by mouth 4 (Four) Times a Day., Disp: 60 mL, Rfl: 0    ondansetron (ZOFRAN) 4 MG tablet, Take 1 tablet by mouth See Admin Instructions. Take 1 tablet by mouth every 6-8 hours as needed, Disp: 30 tablet, Rfl: 5    PEG-KCl-NaCl-NaSulf-Na Asc-C (MoviPrep) 100 g reconstituted solution  "powder, Use as directed by provider for colonoscopy prep, Disp: 1 each, Rfl: 0    promethazine (PHENERGAN) 25 MG tablet, Take 1 tablet by mouth Every 6 (Six) Hours As Needed., Disp: , Rfl:     rosuvastatin (CRESTOR) 5 MG tablet, , Disp: , Rfl:     terconazole (TERAZOL 7) 0.4 % vaginal cream, Insert 1 applicator into the vagina Every Night., Disp: 45 g, Rfl: 1    triamcinolone (KENALOG) 0.1 % ointment, APPLY TO AFFECTED SKIN ON HANDS AND NOSE TWICE A DAY AS NEEDED, Disp: , Rfl:     busPIRone (BUSPAR) 30 MG tablet, Take 1 tablet by mouth 2 (Two) Times a Day for 30 days., Disp: 30 tablet, Rfl: 1    flucytosine (ANCOBON) 500 MG capsule, Take 1 capsule by mouth 4 (Four) Times a Day for 14 days., Disp: 56 capsule, Rfl: 0    naproxen (Naprosyn) 500 MG tablet, Take 1 tablet by mouth 2 (Two) Times a Day With Meals for 10 days., Disp: 20 tablet, Rfl: 0    Allergies:   Allergies   Allergen Reactions    Lamotrigine Rash     Do not re-challenge medication. Possible Jered-Estrada's.          Physical Exam:  Vital Signs:   Vitals:    05/06/24 1149   BP: 110/68   Pulse: 72   Resp: 14   Temp: 96.9 °F (36.1 °C)   SpO2: 99%   Weight: 92.5 kg (204 lb)   Height: 167.6 cm (65.98\")     Body mass index is 32.95 kg/m².     Physical Exam  Vitals and nursing note reviewed.   Constitutional:       Appearance: Normal appearance.   HENT:      Head: Normocephalic and atraumatic.   Cardiovascular:      Rate and Rhythm: Normal rate and regular rhythm.   Pulmonary:      Effort: Pulmonary effort is normal.      Breath sounds: Normal breath sounds.   Abdominal:      General: Bowel sounds are normal.   Skin:     General: Skin is warm.   Neurological:      General: No focal deficit present.      Mental Status: She is alert and oriented to person, place, and time.   Psychiatric:         Mood and Affect: Mood normal.           Assessment/Plan:   Diagnoses and all orders for this visit:    1. Frequent urination (Primary)  -     POCT urinalysis dipstick, " automated    2. Candida glabrata infection  -     Ambulatory Referral to Infectious Disease  -     Comprehensive Metabolic Panel  -     CBC Auto Differential  -     Hemoglobin A1c  -     Lipid Panel  -     T4, Free  -     TSH  -     NuSwab VG+ - Swab, Vagina  -     flucytosine (ANCOBON) 500 MG capsule; Take 1 capsule by mouth 4 (Four) Times a Day for 14 days.  Dispense: 56 capsule; Refill: 0    3. Prediabetes  -     Hemoglobin A1c    4. Fibromyalgia  -     naproxen (Naprosyn) 500 MG tablet; Take 1 tablet by mouth 2 (Two) Times a Day With Meals for 10 days.  Dispense: 20 tablet; Refill: 0  -     cyclobenzaprine (FLEXERIL) 10 MG tablet; Take 1 tablet by mouth 2 (Two) Times a Day As Needed for Muscle Spasms for up to 10 days.  Dispense: 20 tablet; Refill: 0    Other orders  -     CBC & Differential       Ordering labs  Referral to ID  Take medications as prescribed within chart today  Monitor for worsening symptoms  Increase water intake    Follow Up:   Return if symptoms worsen or fail to improve.    Crystal Soler. TEJA   [facility]

## 2024-05-07 ENCOUNTER — OUTSIDE FACILITY SERVICE (OUTPATIENT)
Dept: GASTROENTEROLOGY | Facility: CLINIC | Age: 44
End: 2024-05-07
Payer: COMMERCIAL

## 2024-05-07 ENCOUNTER — TELEPHONE (OUTPATIENT)
Dept: OBSTETRICS AND GYNECOLOGY | Facility: CLINIC | Age: 44
End: 2024-05-07
Payer: COMMERCIAL

## 2024-05-07 LAB
ALBUMIN SERPL-MCNC: 4.3 G/DL (ref 3.9–4.9)
ALBUMIN/GLOB SERPL: 1.6 {RATIO} (ref 1.2–2.2)
ALP SERPL-CCNC: 143 IU/L (ref 44–121)
ALT SERPL-CCNC: 18 IU/L (ref 0–32)
AST SERPL-CCNC: 16 IU/L (ref 0–40)
BASOPHILS # BLD AUTO: 0.1 X10E3/UL (ref 0–0.2)
BASOPHILS NFR BLD AUTO: 1 %
BILIRUB SERPL-MCNC: <0.2 MG/DL (ref 0–1.2)
BUN SERPL-MCNC: 12 MG/DL (ref 6–24)
BUN/CREAT SERPL: 13 (ref 9–23)
CALCIUM SERPL-MCNC: 10 MG/DL (ref 8.7–10.2)
CHLORIDE SERPL-SCNC: 105 MMOL/L (ref 96–106)
CHOLEST SERPL-MCNC: 197 MG/DL (ref 100–199)
CO2 SERPL-SCNC: 23 MMOL/L (ref 20–29)
CREAT SERPL-MCNC: 0.95 MG/DL (ref 0.57–1)
EGFRCR SERPLBLD CKD-EPI 2021: 76 ML/MIN/1.73
EOSINOPHIL # BLD AUTO: 0.2 X10E3/UL (ref 0–0.4)
EOSINOPHIL NFR BLD AUTO: 3 %
ERYTHROCYTE [DISTWIDTH] IN BLOOD BY AUTOMATED COUNT: 14.7 % (ref 11.7–15.4)
GLOBULIN SER CALC-MCNC: 2.7 G/DL (ref 1.5–4.5)
GLUCOSE SERPL-MCNC: 93 MG/DL (ref 70–99)
HBA1C MFR BLD: 6.2 % (ref 4.8–5.6)
HCT VFR BLD AUTO: 40 % (ref 34–46.6)
HDLC SERPL-MCNC: 47 MG/DL
HGB BLD-MCNC: 13.2 G/DL (ref 11.1–15.9)
IMM GRANULOCYTES # BLD AUTO: 0 X10E3/UL (ref 0–0.1)
IMM GRANULOCYTES NFR BLD AUTO: 0 %
LDLC SERPL CALC-MCNC: 130 MG/DL (ref 0–99)
LYMPHOCYTES # BLD AUTO: 3.3 X10E3/UL (ref 0.7–3.1)
LYMPHOCYTES NFR BLD AUTO: 54 %
MCH RBC QN AUTO: 26.1 PG (ref 26.6–33)
MCHC RBC AUTO-ENTMCNC: 33 G/DL (ref 31.5–35.7)
MCV RBC AUTO: 79 FL (ref 79–97)
MONOCYTES # BLD AUTO: 0.3 X10E3/UL (ref 0.1–0.9)
MONOCYTES NFR BLD AUTO: 6 %
NEUTROPHILS # BLD AUTO: 2.2 X10E3/UL (ref 1.4–7)
NEUTROPHILS NFR BLD AUTO: 36 %
PLATELET # BLD AUTO: 382 X10E3/UL (ref 150–450)
POTASSIUM SERPL-SCNC: 4 MMOL/L (ref 3.5–5.2)
PROT SERPL-MCNC: 7 G/DL (ref 6–8.5)
RBC # BLD AUTO: 5.05 X10E6/UL (ref 3.77–5.28)
SODIUM SERPL-SCNC: 142 MMOL/L (ref 134–144)
T4 FREE SERPL-MCNC: 0.84 NG/DL (ref 0.82–1.77)
TRIGL SERPL-MCNC: 111 MG/DL (ref 0–149)
TSH SERPL DL<=0.005 MIU/L-ACNC: 1.46 UIU/ML (ref 0.45–4.5)
VLDLC SERPL CALC-MCNC: 20 MG/DL (ref 5–40)
WBC # BLD AUTO: 6.1 X10E3/UL (ref 3.4–10.8)

## 2024-05-07 PROCEDURE — 88305 TISSUE EXAM BY PATHOLOGIST: CPT | Performed by: INTERNAL MEDICINE

## 2024-05-08 ENCOUNTER — LAB REQUISITION (OUTPATIENT)
Dept: LAB | Facility: HOSPITAL | Age: 44
End: 2024-05-08
Payer: COMMERCIAL

## 2024-05-08 ENCOUNTER — OFFICE VISIT (OUTPATIENT)
Dept: OBSTETRICS AND GYNECOLOGY | Facility: CLINIC | Age: 44
End: 2024-05-08
Payer: COMMERCIAL

## 2024-05-08 VITALS
DIASTOLIC BLOOD PRESSURE: 78 MMHG | HEIGHT: 66 IN | WEIGHT: 203 LBS | SYSTOLIC BLOOD PRESSURE: 124 MMHG | BODY MASS INDEX: 32.62 KG/M2

## 2024-05-08 DIAGNOSIS — N89.8 VAGINAL DISCHARGE: Primary | ICD-10-CM

## 2024-05-08 DIAGNOSIS — D12.5 BENIGN NEOPLASM OF SIGMOID COLON: ICD-10-CM

## 2024-05-08 DIAGNOSIS — K57.30 DIVERTICULOSIS OF LARGE INTESTINE WITHOUT PERFORATION OR ABSCESS WITHOUT BLEEDING: ICD-10-CM

## 2024-05-08 DIAGNOSIS — K62.5 HEMORRHAGE OF ANUS AND RECTUM: ICD-10-CM

## 2024-05-08 DIAGNOSIS — K92.2 GASTROINTESTINAL HEMORRHAGE, UNSPECIFIED: ICD-10-CM

## 2024-05-08 LAB
A VAGINAE DNA VAG QL NAA+PROBE: NORMAL SCORE
BVAB2 DNA VAG QL NAA+PROBE: NORMAL SCORE
C ALBICANS DNA VAG QL NAA+PROBE: NEGATIVE
C GLABRATA DNA VAG QL NAA+PROBE: NEGATIVE
C TRACH DNA VAG QL NAA+PROBE: NEGATIVE
MEGA1 DNA VAG QL NAA+PROBE: NORMAL SCORE
N GONORRHOEA DNA VAG QL NAA+PROBE: NEGATIVE
T VAGINALIS DNA VAG QL NAA+PROBE: NEGATIVE
WET PREP GENITAL: NEGATIVE

## 2024-05-10 LAB — REF LAB TEST METHOD: NORMAL

## 2024-05-12 ENCOUNTER — READMISSION MANAGEMENT (OUTPATIENT)
Dept: CALL CENTER | Facility: HOSPITAL | Age: 44
End: 2024-05-12
Payer: COMMERCIAL

## 2024-05-13 ENCOUNTER — TRANSITIONAL CARE MANAGEMENT TELEPHONE ENCOUNTER (OUTPATIENT)
Dept: CALL CENTER | Facility: HOSPITAL | Age: 44
End: 2024-05-13
Payer: COMMERCIAL

## 2024-05-13 NOTE — OUTREACH NOTE
Call Center TCM Note      Flowsheet Row Responses   [facility] patient discharged from? Non-   Does the patient have one of the following disease processes/diagnoses(primary or secondary)? Other  [ Hospital]   TCM attempt successful? Yes   Call start time 1047   Call end time 1049   Discharge diagnosis Dysphagia,   Meds reviewed with patient/caregiver? Yes   Is the patient taking all medications as directed (includes completed medication regime)? Yes   Does the patient have an appointment with their PCP within 7-14 days of discharge? Yes   Has home health visited the patient within 72 hours of discharge? N/A   Psychosocial issues? No   Nursing interventions Notified family   What is the patient's perception of their health status since discharge? Same   Is the patient/caregiver able to teach back signs and symptoms related to disease process for when to call PCP? Yes   Is the patient/caregiver able to teach back signs and symptoms related to disease process for when to call 911? Yes   Is the patient/caregiver able to teach back the hierarchy of who to call/visit for symptoms/problems? PCP, Specialist, Home health nurse, Urgent Care, ED, 911 Yes   TCM call completed? Yes   Call end time 1049   Would this patient benefit from a Referral to Amb Social Work? No   Is the patient interested in additional calls from an ambulatory ? No            Kate Egan LPN    5/13/2024, 11:01 EDT

## 2024-05-15 DIAGNOSIS — B37.9 CANDIDA GLABRATA INFECTION: ICD-10-CM

## 2024-05-16 RX ORDER — FLUCYTOSINE 500 MG/1
500 CAPSULE ORAL 4 TIMES DAILY
Qty: 56 CAPSULE | Refills: 0 | OUTPATIENT
Start: 2024-05-16 | End: 2024-05-30

## 2024-05-17 ENCOUNTER — TELEPHONE (OUTPATIENT)
Dept: FAMILY MEDICINE CLINIC | Facility: CLINIC | Age: 44
End: 2024-05-17

## 2024-05-17 ENCOUNTER — NURSE TRIAGE (OUTPATIENT)
Dept: CALL CENTER | Facility: HOSPITAL | Age: 44
End: 2024-05-17
Payer: COMMERCIAL

## 2024-05-17 NOTE — TELEPHONE ENCOUNTER
PER RECENT REFERRAL UPDATES Dr daugherty decline referral - [facility] doesn't accept medicaid - called in referral to [facility] they will call patient to schedule     [facility] CALLED AND DENIED REFERRAL AS WELL.

## 2024-05-17 NOTE — TELEPHONE ENCOUNTER
"Patient left VM on [facility] triage line canceling todays appointment.       Answer Assessment - Initial Assessment Questions  1. REASON FOR CALL or QUESTION: \"What is your reason for calling today?\" or \"How can I best  help you?\" or \"What question do you have that I can help answer?\"      Calling to cancel todays appointment   2. CALLER: Document the source of call. (e.g., laboratory, patient).      Patient    Protocols used: PCP Call - No Triage-ADULT-    "

## 2024-05-21 ENCOUNTER — OFFICE VISIT (OUTPATIENT)
Dept: FAMILY MEDICINE CLINIC | Facility: CLINIC | Age: 44
End: 2024-05-21
Payer: COMMERCIAL

## 2024-05-21 VITALS
RESPIRATION RATE: 14 BRPM | TEMPERATURE: 97.5 F | DIASTOLIC BLOOD PRESSURE: 82 MMHG | WEIGHT: 198 LBS | HEART RATE: 98 BPM | OXYGEN SATURATION: 98 % | BODY MASS INDEX: 31.82 KG/M2 | HEIGHT: 66 IN | SYSTOLIC BLOOD PRESSURE: 118 MMHG

## 2024-05-21 DIAGNOSIS — F14.10 COCAINE SUBSTANCE ABUSE: Primary | ICD-10-CM

## 2024-05-21 DIAGNOSIS — F12.10 MARIJUANA ABUSE: ICD-10-CM

## 2024-05-21 PROCEDURE — 99213 OFFICE O/P EST LOW 20 MIN: CPT | Performed by: NURSE PRACTITIONER

## 2024-05-21 PROCEDURE — 1125F AMNT PAIN NOTED PAIN PRSNT: CPT | Performed by: NURSE PRACTITIONER

## 2024-05-21 NOTE — PROGRESS NOTES
Date: 2024   Patient Name: Chencho Bah  : 1980   MRN: [id number]     Chief Complaint:    Chief Complaint   Patient presents with    Follow-up     Hospital follow up. Having trouble swallowing. Has candida glabrata. Infectious disease doctors stated that's not what it is and took her off all her meds that treat it. She asked to be discharged as it was getting worse. She would like referral to Urologist due to urinary urgency and back pain. She would also like referral to ENT for ears and throat. She already sees Dr. Lyla Singh.        History of Present Illness: Chencho Bah is a 44 y.o. female who is here today to follow up for ER visit at . She is having trouble swallowing and was positive for Candida Glabrata. She has been denied by ID to be evaluated 3 times. Today she is wanting to have a referral to ENT and Urology, but is not currently having any symptoms.             Review of Systems:   Review of Systems   Skin:         Tongue pain       I have reviewed the patients family history, social history, past medical history, past surgical history and have updated it as appropriate.     Medications:     Current Outpatient Medications:     amLODIPine (NORVASC) 5 MG tablet, Take 1 tablet by mouth Daily., Disp: , Rfl:     B-12 Methylcobalamin 1000 MCG tablet dispersible, Place 1,000 mg on the tongue Daily., Disp: 90 tablet, Rfl: 3    chlorhexidine (HIBICLENS) 4 % external liquid, Apply  topically to the appropriate area as directed Daily As Needed for Wound Care., Disp: 236 mL, Rfl: 2    clindamycin 1 % gel, , Disp: , Rfl:     cloNIDine (CATAPRES) 0.1 MG tablet, Take 1 tablet by mouth 3 (Three) Times a Day As Needed for High Blood Pressure., Disp: 90 tablet, Rfl: 3    clotrimazole-betamethasone (LOTRISONE) 1-0.05 % cream, APPLY 1-2 TIMES DAILY TO AFFECTED AREA, Disp: , Rfl:     Creon 47685-598576 units capsule delayed-release particles capsule, TAKE 1 CAPSULE BY MOUTH TWICE A DAY  WITH MEALS, Disp: 180 capsule, Rfl: 3    dexlansoprazole (DEXILANT) 60 MG capsule, TAKE 1 CAPSULE BY MOUTH EVERY DAY, Disp: 90 capsule, Rfl: 3    dicyclomine (BENTYL) 20 MG tablet, TAKE 1 TABLET BY MOUTH 4 TIMES A DAY., Disp: 120 tablet, Rfl: 5    ergocalciferol (ERGOCALCIFEROL) 1.25 MG (00528 UT) capsule, Take 1 capsule by mouth 1 (One) Time Per Week., Disp: 5 capsule, Rfl: 11    hydrOXYzine pamoate (VISTARIL) 100 MG capsule, Take 1 capsule by mouth 3 (Three) Times a Day As Needed for Itching or Anxiety (For panic attacks)., Disp: 60 capsule, Rfl: 1    ibuprofen (ADVIL,MOTRIN) 800 MG tablet, TAKE 1 TABLET BY MOUTH EVERY 8 (EIGHT) HOURS AS NEEDED FOR MILD PAIN ., Disp: 90 tablet, Rfl: 0    imipramine (TOFRANIL) 25 MG tablet, Take 1 tablet by mouth Daily., Disp: , Rfl:     levocetirizine (XYZAL) 5 MG tablet, TAKE 1 TABLET BY MOUTH EVERY DAY IN THE EVENING, Disp: 30 tablet, Rfl: 5    Multiple Vitamin (THERA-TABS) tablet, Take 1 tablet by mouth Every Morning., Disp: , Rfl: 0    mupirocin (BACTROBAN) 2 % ointment, , Disp: , Rfl:     nystatin (MYCOSTATIN) 100,000 unit/mL suspension, Take 5 mL by mouth 4 (Four) Times a Day., Disp: 60 mL, Rfl: 0    ondansetron (ZOFRAN) 4 MG tablet, Take 1 tablet by mouth See Admin Instructions. Take 1 tablet by mouth every 6-8 hours as needed, Disp: 30 tablet, Rfl: 5    PEG-KCl-NaCl-NaSulf-Na Asc-C (MoviPrep) 100 g reconstituted solution powder, Use as directed by provider for colonoscopy prep, Disp: 1 each, Rfl: 0    promethazine (PHENERGAN) 25 MG tablet, Take 1 tablet by mouth Every 6 (Six) Hours As Needed., Disp: , Rfl:     rosuvastatin (CRESTOR) 5 MG tablet, , Disp: , Rfl:     terconazole (TERAZOL 7) 0.4 % vaginal cream, Insert 1 applicator into the vagina Every Night., Disp: 45 g, Rfl: 1    triamcinolone (KENALOG) 0.1 % ointment, APPLY TO AFFECTED SKIN ON HANDS AND NOSE TWICE A DAY AS NEEDED, Disp: , Rfl:     busPIRone (BUSPAR) 30 MG tablet, Take 1 tablet by mouth 2 (Two) Times a Day  "for 30 days., Disp: 30 tablet, Rfl: 1    Allergies:   Allergies   Allergen Reactions    Lamotrigine Rash     Do not re-challenge medication. Possible Jered-Estrada's.        PHQ-9 Total Score:       Physical Exam:  Vital Signs:   Vitals:    05/21/24 1145   BP: 118/82   Pulse: 98   Resp: 14   Temp: 97.5 °F (36.4 °C)   SpO2: 98%   Weight: 89.8 kg (198 lb)   Height: 167.6 cm (65.98\")     Body mass index is 31.98 kg/m².           Physical Exam  Constitutional:       Appearance: She is obese.   HENT:      Head: Normocephalic and atraumatic.   Pulmonary:      Effort: Pulmonary effort is normal.   Neurological:      Mental Status: She is alert.   Psychiatric:         Behavior: Behavior is uncooperative and agitated.           Assessment/Plan:   Diagnoses and all orders for this visit:    1. Cocaine substance abuse (Primary)    2. Marijuana abuse       During appt, I was educating patient that symptoms she is experiencing is from the substance abuse of cocaine. She states she snorts the cocaine. Her symptoms are from the chronic cocaine use and per patient, \"I have been using since I was 18 years old.\" I offered to give patient help and get her into a rehab facility but she declined and walked out on appt.     Follow Up:   No follow-ups on file.      Crystal Soler. TEJA   [facility]     "

## 2024-07-29 DIAGNOSIS — K21.00 GASTROESOPHAGEAL REFLUX DISEASE WITH ESOPHAGITIS WITHOUT HEMORRHAGE: ICD-10-CM

## 2024-07-29 DIAGNOSIS — K29.00 OTHER ACUTE GASTRITIS WITHOUT HEMORRHAGE: ICD-10-CM

## 2024-07-29 RX ORDER — DEXLANSOPRAZOLE 60 MG/1
CAPSULE, DELAYED RELEASE ORAL
Qty: 90 CAPSULE | Refills: 3 | Status: SHIPPED | OUTPATIENT
Start: 2024-07-29

## 2024-09-25 NOTE — TELEPHONE ENCOUNTER
----- Message from Taiwo Guillen MD sent at 8/11/2020  3:42 PM EDT -----  Let  the patient know that the gastric emptying study was normal.  Thank you,  INDIRA  
I spoke with Ms Bah and gave GES results.   
locker [id number]

## 2024-11-12 ENCOUNTER — OFFICE VISIT (OUTPATIENT)
Dept: OBSTETRICS AND GYNECOLOGY | Facility: CLINIC | Age: 44
End: 2024-11-12
Payer: COMMERCIAL

## 2024-11-12 VITALS
BODY MASS INDEX: 32.78 KG/M2 | HEIGHT: 66 IN | SYSTOLIC BLOOD PRESSURE: 120 MMHG | RESPIRATION RATE: 18 BRPM | DIASTOLIC BLOOD PRESSURE: 80 MMHG | WEIGHT: 204 LBS

## 2024-11-12 DIAGNOSIS — N89.8 VAGINAL DISCHARGE: ICD-10-CM

## 2024-11-12 DIAGNOSIS — Z11.3 SCREENING FOR STD (SEXUALLY TRANSMITTED DISEASE): Primary | ICD-10-CM

## 2024-11-12 LAB
KOH PREP NAIL: NORMAL
WET PREP GENITAL: NORMAL

## 2024-11-12 PROCEDURE — 99213 OFFICE O/P EST LOW 20 MIN: CPT | Performed by: NURSE PRACTITIONER

## 2024-11-12 PROCEDURE — 87220 TISSUE EXAM FOR FUNGI: CPT | Performed by: NURSE PRACTITIONER

## 2024-11-12 PROCEDURE — 87210 SMEAR WET MOUNT SALINE/INK: CPT | Performed by: NURSE PRACTITIONER

## 2024-11-12 NOTE — PROGRESS NOTES
Chief Complaint   Patient presents with    std screening         Subjective   HPI  Chencho Bah is a 44 y.o. female, , who presents for screening for STD's. Patient had intercourse a week ago with a new partner. She went to the ER and was given an injection and some PO antibiotics but she did not take those.     Her last LMP was No LMP recorded. Patient has had a hysterectomy. She reports sexual contact with individual with uncertain background 1 week ago. The patient reports vaginal discharge.  She describes this as thick, watery, and clear . These symptoms have been present for 1 week(s). She is requesting STD testing with blood work. The patient reports a past history of: gonorrhea..      Additional OB/GYN History   Last Pap :   Last Completed Pap Smear       This patient has no relevant Health Maintenance data.          History of abnormal Pap smear: yes -   OB History          4    Para   2    Term   2            AB   2    Living   2         SAB   2    IAB        Ectopic        Molar        Multiple        Live Births   2                  Current Outpatient Medications:     amLODIPine (NORVASC) 5 MG tablet, Take 1 tablet by mouth Daily., Disp: , Rfl:     B-12 Methylcobalamin 1000 MCG tablet dispersible, Place 1,000 mg on the tongue Daily., Disp: 90 tablet, Rfl: 3    chlorhexidine (HIBICLENS) 4 % external liquid, Apply  topically to the appropriate area as directed Daily As Needed for Wound Care., Disp: 236 mL, Rfl: 2    clindamycin 1 % gel, , Disp: , Rfl:     cloNIDine (CATAPRES) 0.1 MG tablet, Take 1 tablet by mouth 3 (Three) Times a Day As Needed for High Blood Pressure., Disp: 90 tablet, Rfl: 3    clotrimazole-betamethasone (LOTRISONE) 1-0.05 % cream, APPLY 1-2 TIMES DAILY TO AFFECTED AREA, Disp: , Rfl:     Creon 70601-981794 units capsule delayed-release particles capsule, TAKE 1 CAPSULE BY MOUTH TWICE A DAY WITH MEALS, Disp: 180 capsule, Rfl: 3     dexlansoprazole (DEXILANT) 60 MG capsule, TAKE 1 CAPSULE BY MOUTH EVERY DAY, Disp: 90 capsule, Rfl: 3    dicyclomine (BENTYL) 20 MG tablet, TAKE 1 TABLET BY MOUTH 4 TIMES A DAY., Disp: 120 tablet, Rfl: 5    ergocalciferol (ERGOCALCIFEROL) 1.25 MG (53522 UT) capsule, Take 1 capsule by mouth 1 (One) Time Per Week., Disp: 5 capsule, Rfl: 11    hydrOXYzine pamoate (VISTARIL) 100 MG capsule, Take 1 capsule by mouth 3 (Three) Times a Day As Needed for Itching or Anxiety (For panic attacks)., Disp: 60 capsule, Rfl: 1    ibuprofen (ADVIL,MOTRIN) 800 MG tablet, TAKE 1 TABLET BY MOUTH EVERY 8 (EIGHT) HOURS AS NEEDED FOR MILD PAIN ., Disp: 90 tablet, Rfl: 0    imipramine (TOFRANIL) 25 MG tablet, Take 1 tablet by mouth Daily., Disp: , Rfl:     levocetirizine (XYZAL) 5 MG tablet, TAKE 1 TABLET BY MOUTH EVERY DAY IN THE EVENING, Disp: 30 tablet, Rfl: 5    Multiple Vitamin (THERA-TABS) tablet, Take 1 tablet by mouth Every Morning., Disp: , Rfl: 0    mupirocin (BACTROBAN) 2 % ointment, , Disp: , Rfl:     nystatin (MYCOSTATIN) 100,000 unit/mL suspension, Take 5 mL by mouth 4 (Four) Times a Day., Disp: 60 mL, Rfl: 0    ondansetron (ZOFRAN) 4 MG tablet, Take 1 tablet by mouth See Admin Instructions. Take 1 tablet by mouth every 6-8 hours as needed, Disp: 30 tablet, Rfl: 5    promethazine (PHENERGAN) 25 MG tablet, Take 1 tablet by mouth Every 6 (Six) Hours As Needed., Disp: , Rfl:     rosuvastatin (CRESTOR) 5 MG tablet, , Disp: , Rfl:     terconazole (TERAZOL 7) 0.4 % vaginal cream, Insert 1 applicator into the vagina Every Night., Disp: 45 g, Rfl: 1    triamcinolone (KENALOG) 0.1 % ointment, APPLY TO AFFECTED SKIN ON HANDS AND NOSE TWICE A DAY AS NEEDED, Disp: , Rfl:     busPIRone (BUSPAR) 30 MG tablet, Take 1 tablet by mouth 2 (Two) Times a Day for 30 days., Disp: 30 tablet, Rfl: 1     Past Medical History:   Diagnosis Date    Acute nonintractable headache     nonspcecified headache type     Acute otitis externa of left ear     Acute  "otitis media     Allergic     Anxiety     Arthritis     Back pain     Blurry vision     Contact dermatitis due to poison ivy     Depression     Diarrhea     Dizziness     Edema     Factor V Leiden mutation     \"jail\"    Fatigue     Flatulence     Fractures, compound     h/o     Gastritis     Gastroenteritis     GERD (gastroesophageal reflux disease)     H/O blood clots     Hair loss     Hidradenitis     History of cardiac disorder     History of kidney infection     History of seizure disorder     History of seizures     Hypertension     Joint pain, hip     Low serum vitamin D     Miscarriage     Nausea     Otalgia of left ear     Pancreatitis     Rash     Renal calculi     personal h/o     Screening breast examination     admits    Speech complaints     Vitamin D deficiency         Past Surgical History:   Procedure Laterality Date    CHOLECYSTECTOMY      COLONOSCOPY      HIP SURGERY      multiple since 5th grade x 12 replacements as well     TOTAL ABDOMINAL HYSTERECTOMY WITH SALPINGO OOPHORECTOMY Bilateral 2005    menometorrhagia    TUBAL ABDOMINAL LIGATION      UPPER GASTROINTESTINAL ENDOSCOPY         The additional following portions of the patient's history were reviewed and updated as appropriate: allergies, current medications, past family history, past medical history, past social history, past surgical history, and problem list.    Review of Systems   Constitutional: Negative.    Genitourinary:  Positive for vaginal discharge.     All other systems reviewed and are negative.     I have reviewed and agree with the HPI, ROS, and historical information as entered above. Akila Manley, APRN      Objective   /80   Resp 18   Ht 167.6 cm (65.98\")   Wt 92.5 kg (204 lb)   BMI 32.94 kg/m²     Physical Exam  Constitutional:       Appearance: Normal appearance.   Pulmonary:      Effort: Pulmonary effort is normal.   Abdominal:      Palpations: Abdomen is soft.   Genitourinary:     General: Normal " vulva.      Vagina: Normal.      Uterus: Absent.       Comments: Vaginal cuff intact. Minimal white discharge noted.   Neurological:      Mental Status: She is alert.         Wet mount performed? Yes. Pertinent positives include: none. All negative    Assessment & Plan     Assessment and Plan    Problem List Items Addressed This Visit    None  Visit Diagnoses       Screening for STD (sexually transmitted disease)    -  Primary    Relevant Orders    NuSwab VG+, Candida 6sp    HIV-1 / O / 2 Ag / Antibody    RPR Qualitative with Reflex to Quant    Hepatitis B Surface Antigen    Hepatitis C Antibody    Vaginal discharge        Relevant Orders    POC Wet Prep    POC KOH Prep          WP/KOH negative.   Of note, she reports that she still has not received treatment for PO yeast, the brexafemme is no longer being manufactured and after her last hospitalization and workup they have determined no further work up is necessary. She takes fluconazole prn and also uses PO bleach. I advised her stop using bleach in her mouth. She Can F/U with PCP for further issues regarding her oral discomfort and yeast.     See orders for STD cultures and assays  RTO for annual exam         Akila Manley, APRN  11/12/2024

## 2024-11-13 LAB
HBV SURFACE AG SERPL QL IA: NEGATIVE
HCV IGG SERPL QL IA: NON REACTIVE
HIV 1+2 AB+HIV1 P24 AG SERPL QL IA: NON REACTIVE
RPR SER QL: NON REACTIVE

## 2024-11-14 LAB
A VAGINAE DNA VAG QL NAA+PROBE: NORMAL SCORE
BVAB2 DNA VAG QL NAA+PROBE: NORMAL SCORE
C ALBICANS DNA VAG QL NAA+PROBE: NEGATIVE
C GLABRATA DNA VAG QL NAA+PROBE: NEGATIVE
C KRUSEI DNA VAG QL NAA+PROBE: NEGATIVE
C LUSITANIAE DNA VAG QL NAA+PROBE: NEGATIVE
C TRACH DNA SPEC QL NAA+PROBE: NEGATIVE
CANDIDA DNA VAG QL NAA+PROBE: NEGATIVE
MEGA1 DNA VAG QL NAA+PROBE: NORMAL SCORE
N GONORRHOEA DNA VAG QL NAA+PROBE: NEGATIVE
T VAGINALIS DNA VAG QL NAA+PROBE: NEGATIVE

## 2024-11-25 ENCOUNTER — OFFICE VISIT (OUTPATIENT)
Dept: OBSTETRICS AND GYNECOLOGY | Facility: CLINIC | Age: 44
End: 2024-11-25
Payer: COMMERCIAL

## 2024-11-25 VITALS
DIASTOLIC BLOOD PRESSURE: 78 MMHG | WEIGHT: 202.8 LBS | BODY MASS INDEX: 32.59 KG/M2 | SYSTOLIC BLOOD PRESSURE: 122 MMHG | HEIGHT: 66 IN

## 2024-11-25 DIAGNOSIS — N89.8 VAGINAL DISCHARGE: Primary | ICD-10-CM

## 2024-11-25 LAB
KOH PREP NAIL: NORMAL
WET PREP GENITAL: NORMAL

## 2024-11-25 PROCEDURE — 87210 SMEAR WET MOUNT SALINE/INK: CPT | Performed by: NURSE PRACTITIONER

## 2024-11-25 PROCEDURE — 99213 OFFICE O/P EST LOW 20 MIN: CPT | Performed by: NURSE PRACTITIONER

## 2024-11-25 PROCEDURE — 1160F RVW MEDS BY RX/DR IN RCRD: CPT | Performed by: NURSE PRACTITIONER

## 2024-11-25 PROCEDURE — 1159F MED LIST DOCD IN RCRD: CPT | Performed by: NURSE PRACTITIONER

## 2024-11-25 PROCEDURE — 87220 TISSUE EXAM FOR FUNGI: CPT | Performed by: NURSE PRACTITIONER

## 2024-11-25 NOTE — PROGRESS NOTES
Chief Complaint   Patient presents with    Vaginitis         Subjective   HPI  Chencho Bah is a 44 y.o. female, , who presents for evaluation of  continued vaginal discharge .  Patient reports that she is leaking from her vagina, and that it is clear.  She denies having an odor, but reports that she has dwight douching due to an odor. She also reports irritation and itching.  She is having to wear a pad due to excessive discharge. She also reports issues with abdominal discomfort and diarrhea that she think is associated with infection.     Prior to the onset of symptoms she was on antibiotics, given by [facility].  She reports starting them following her appt with us.  She reports that they worked, and resolved discharge for 2 hours each time after taking.   She has not recently changed soaps/detergents/toilet tissue.     Sexual History    She is currently sexually active.  She denies new partners since her last appt with us.  She reports that is did worsen following intercourse. Condoms are never used.  She would not like to be screened for STD's at today's exam.    Current birth control method:  hysterectomy  .    Menstrual History:    No LMP recorded. Patient has had a hysterectomy.      Additional OB/GYN History       OB History          4    Para   2    Term   2            AB   2    Living   2         SAB   2    IAB        Ectopic        Molar        Multiple        Live Births   2                The additional following portions of the patient's history were reviewed and updated as appropriate: allergies, current medications, past family history, past medical history, past social history, past surgical history, and problem list.    Review of Systems   Constitutional: Negative.    Gastrointestinal:  Positive for abdominal pain and diarrhea.   Genitourinary:  Positive for vaginal discharge.     All other systems reviewed and are negative.     I have  "reviewed and agree with the HPI, ROS, and historical information as entered above. Akila Manley, APRN      Objective   /78   Ht 167.6 cm (65.98\")   Wt 92 kg (202 lb 12.8 oz)   BMI 32.75 kg/m²     Physical Exam  Constitutional:       Appearance: Normal appearance.   Pulmonary:      Effort: Pulmonary effort is normal.   Abdominal:      Palpations: Abdomen is soft.   Genitourinary:     General: Normal vulva.      Vagina: Normal.      Uterus: Absent.       Comments: Scant amount normal appearing white vaginal discharge  Neurological:      Mental Status: She is alert.         Wet mount performed? Yes. Pertinent positives include: all negative    Assessment & Plan     Assessment and Plan    Problem List Items Addressed This Visit    None  Visit Diagnoses       Vaginal discharge    -  Primary    Relevant Orders    Nuab VG+, Candida 6sp    POC Wet Prep (Completed)    POC KOH Prep (Completed)          WP/KOH negative. She reports she does not feel it is urine or semen that she is experiencing. She states it \"pours out\" at times. Exam is normal with no significant amount of discharge noted. Will resend cultures. I advised her to stop using a douche.     NuSwab ordered  Counseling on Vaginitis provided  3. She is s/p [facility], she should follow up with her PCP for abd pain and diarrhea      Akila Manley, APRN  11/25/2024   "

## 2025-02-25 ENCOUNTER — OFFICE VISIT (OUTPATIENT)
Dept: OBSTETRICS AND GYNECOLOGY | Facility: CLINIC | Age: 45
End: 2025-02-25
Payer: COMMERCIAL

## 2025-02-25 VITALS
DIASTOLIC BLOOD PRESSURE: 84 MMHG | WEIGHT: 198 LBS | BODY MASS INDEX: 31.82 KG/M2 | HEIGHT: 66 IN | SYSTOLIC BLOOD PRESSURE: 116 MMHG | RESPIRATION RATE: 18 BRPM

## 2025-02-25 DIAGNOSIS — B96.89 BV (BACTERIAL VAGINOSIS): ICD-10-CM

## 2025-02-25 DIAGNOSIS — N89.8 VAGINAL DISCHARGE: Primary | ICD-10-CM

## 2025-02-25 DIAGNOSIS — N76.0 BV (BACTERIAL VAGINOSIS): ICD-10-CM

## 2025-02-25 RX ORDER — METRONIDAZOLE 500 MG/1
500 TABLET ORAL 2 TIMES DAILY
Qty: 28 TABLET | Refills: 6 | Status: SHIPPED | OUTPATIENT
Start: 2025-02-25

## 2025-02-25 NOTE — PROGRESS NOTES
"            Chief Complaint   Patient presents with    Vaginitis         Subjective   HPI  Chencho Bah is a 45 y.o. female, , who presents for evaluation of  BV . The discharge is absent.      Prior to the onset of symptoms she was on antibiotics.  She has not recently changed soaps/detergents/toilet tissue.  Prior to this visit, she has used none in an attempt to improve her symptoms.     Sexual History    She is currently sexually active. In the past year there has been NO new sexual partners. Condoms are never used.  She would like to be screened for STD's at today's exam.    Current birth control method:  hysterectomy .    Menstrual History:    No LMP recorded. Patient has had a hysterectomy.    In the past 6 months her cycles have been absent.   Her menstrual flow has been absent. Intermenstrual bleeding is absent.  Post-coital bleeding is absent.      Additional OB/GYN History   Last Pap :   Last Completed Pap Smear       This patient has no relevant Health Maintenance data.            OB History          4    Para   2    Term   2            AB   2    Living   2         SAB   2    IAB        Ectopic        Molar        Multiple        Live Births   2                The additional following portions of the patient's history were reviewed and updated as appropriate: allergies, current medications, past family history, past medical history, past social history, past surgical history, and problem list.    Review of Systems  All other systems reviewed and are negative.     I have reviewed and agree with the HPI, ROS, and historical information as entered above. Elizabeth Sinclair MD      Objective   /84   Resp 18   Ht 167.6 cm (65.98\")   Wt 89.8 kg (198 lb)   BMI 31.97 kg/m²     Physical Exam    Wet mount performed? Yes. Pertinent positives include: Clue Cell    Assessment & Plan     Assessment and Plan    Problem List Items Addressed This Visit    None  Visit Diagnoses       " Vaginal discharge    -  Primary    Relevant Orders    Chlamydia trachomatis, Neisseria gonorrhoeae, Trichomonas vaginalis, PCR - Swab, Cervix    NuSwab BV & Candida - Swab, Vagina    BV (bacterial vaginosis)        Relevant Medications    metroNIDAZOLE (Flagyl) 500 MG tablet              NuSwab ordered  Medication(s) ordered  Will rx nick and do 3x a week for prevention and rx her partner 1 week   wet mount clue cells  Flagyl 500 BID x 7 days          Elizabeth Sinclair MD  02/25/2025

## 2025-02-27 LAB
A VAGINAE DNA VAG QL NAA+PROBE: ABNORMAL SCORE
BVAB2 DNA VAG QL NAA+PROBE: ABNORMAL SCORE
C ALBICANS DNA VAG QL NAA+PROBE: NEGATIVE
C GLABRATA DNA VAG QL NAA+PROBE: POSITIVE
C TRACH RRNA SPEC QL NAA+PROBE: NEGATIVE
MEGA1 DNA VAG QL NAA+PROBE: ABNORMAL SCORE
N GONORRHOEA RRNA SPEC QL NAA+PROBE: NEGATIVE
T VAGINALIS RRNA SPEC QL NAA+PROBE: NEGATIVE

## 2025-03-03 ENCOUNTER — TELEPHONE (OUTPATIENT)
Dept: OBSTETRICS AND GYNECOLOGY | Facility: CLINIC | Age: 45
End: 2025-03-03
Payer: COMMERCIAL

## 2025-03-03 NOTE — TELEPHONE ENCOUNTER
"Returned patient phone call to review lab results.   Per Dr. Sinclair \"Results show yeast infection.  Needs to add Diflucan 150 mg now and repeat in 3 days or if she cannot take that used Terazol 7 cream for a week.\"  Pt reports she has chronic yeast infection in mouth, stomach, and vaginal that she has been dealing with for the past year. States she is already taking Diflucan daily with no improvement. Currently taking Flagyl for BV, states it is worsening symptoms and is requesting different abx be sent in.  Informed her Dr. Sinclair is not in office today and we will reach out to her tomorrow regarding recommendations.   "

## 2025-03-04 ENCOUNTER — TELEPHONE (OUTPATIENT)
Dept: OBSTETRICS AND GYNECOLOGY | Facility: CLINIC | Age: 45
End: 2025-03-04
Payer: COMMERCIAL

## 2025-03-04 DIAGNOSIS — B37.9 INFECTION DUE TO CANDIDA GLABRATA: Primary | ICD-10-CM

## 2025-03-04 DIAGNOSIS — B37.0 ORAL YEAST INFECTION: ICD-10-CM

## 2025-03-04 DIAGNOSIS — B37.31 YEAST INFECTION OF THE VAGINA: ICD-10-CM

## 2025-03-04 DIAGNOSIS — B37.2 YEAST INFECTION OF THE SKIN: Primary | ICD-10-CM

## 2025-03-04 NOTE — TELEPHONE ENCOUNTER
Pt calling regarding possible medication change for yeast infection/BV. Pt called yesterday and states no one has returned her call.

## 2025-03-04 NOTE — TELEPHONE ENCOUNTER
Called patient, no answer. [facility].  Spoke with Dr. Sinclair and he sent referral to infectious disease due to persistent yeast. No changes in medication made.

## 2025-03-06 DIAGNOSIS — B37.2 YEAST INFECTION OF THE SKIN: ICD-10-CM

## 2025-03-06 DIAGNOSIS — B37.32 CHRONIC CANDIDIASIS OF VULVA AND VAGINA: Primary | ICD-10-CM

## 2025-03-06 DIAGNOSIS — B37.0 ORAL YEAST INFECTION: ICD-10-CM

## 2025-03-06 RX ORDER — CLOTRIMAZOLE 1 %
CREAM WITH APPLICATOR VAGINAL NIGHTLY
Qty: 45 G | Refills: 0 | Status: SHIPPED | OUTPATIENT
Start: 2025-03-06 | End: 2025-03-13

## 2025-03-06 NOTE — TELEPHONE ENCOUNTER
Pt called and states that she has been calling for 3 days about a medication change and has not heard back

## 2025-03-06 NOTE — TELEPHONE ENCOUNTER
Called patient and reviewed candida glabrata pos no improvement with terazole or diflucan. Will rx vaginal lotrim and ID @ [facility] does not take her insurance. Needs another referral. Will discuss with op.

## 2025-03-07 ENCOUNTER — TELEPHONE (OUTPATIENT)
Dept: OBSTETRICS AND GYNECOLOGY | Facility: CLINIC | Age: 45
End: 2025-03-07
Payer: COMMERCIAL

## 2025-03-07 DIAGNOSIS — B96.89 BV (BACTERIAL VAGINOSIS): Primary | ICD-10-CM

## 2025-03-07 DIAGNOSIS — N76.0 BV (BACTERIAL VAGINOSIS): Primary | ICD-10-CM

## 2025-03-07 RX ORDER — DOXYCYCLINE 100 MG/1
100 CAPSULE ORAL 2 TIMES DAILY
Qty: 14 CAPSULE | Refills: 2 | Status: SHIPPED | OUTPATIENT
Start: 2025-03-07

## 2025-03-07 NOTE — TELEPHONE ENCOUNTER
Patient states that she is not going to take the flagyl that was rx due to it causing yeast infections. She states that the infection has spread to her stomach but when I asked what she meant she states it has spread to her stomach and she has had this happen before and she needs the doxycycline. I spoke with OP and he is going to send it in. She VU

## 2025-03-07 NOTE — TELEPHONE ENCOUNTER
PT STATES SHE HAS NOT BEEN TAKING THE MEDICINE TO TREAT HER BACTERIAL INFECTION BECAUSE IT IS CAUSING A LOT OF YEAST   PATIENT IS REQUESTING DOXYCYCLINE SHE BELIEVES THE BACTERIAL INFECTION HAS SPREAD TO HER STOMACH     VERIFIED PHARMACY [facility]     CALLBACK NUMBER [phone] AND SHE STATES OK TO LEAVE A DETAILED MESSAGE

## 2025-03-11 ENCOUNTER — TELEPHONE (OUTPATIENT)
Dept: OBSTETRICS AND GYNECOLOGY | Facility: CLINIC | Age: 45
End: 2025-03-11
Payer: COMMERCIAL

## 2025-03-11 NOTE — TELEPHONE ENCOUNTER
PT is calling in to see if we can send over another script for doxycycline (MONODOX) 100 MG capsule - she states her insurance wouldn't cover the medication and the pharmacy canceled out the script and she would like us to resend it so she can see a self pay price - please call with any questions.

## 2025-03-13 ENCOUNTER — TELEPHONE (OUTPATIENT)
Dept: OBSTETRICS AND GYNECOLOGY | Facility: CLINIC | Age: 45
End: 2025-03-13
Payer: COMMERCIAL

## 2025-03-13 DIAGNOSIS — N76.0 BV (BACTERIAL VAGINOSIS): ICD-10-CM

## 2025-03-13 DIAGNOSIS — B96.89 BV (BACTERIAL VAGINOSIS): ICD-10-CM

## 2025-03-13 DIAGNOSIS — B37.9 INFECTION DUE TO CANDIDA GLABRATA: ICD-10-CM

## 2025-03-13 RX ORDER — CLOTRIMAZOLE 1 %
CREAM WITH APPLICATOR VAGINAL NIGHTLY
Qty: 45 G | Refills: 0 | Status: SHIPPED | OUTPATIENT
Start: 2025-03-13 | End: 2025-03-20

## 2025-03-13 RX ORDER — DOXYCYCLINE 100 MG/1
100 CAPSULE ORAL 2 TIMES DAILY
Qty: 14 CAPSULE | Refills: 2 | Status: SHIPPED | OUTPATIENT
Start: 2025-03-13

## 2025-03-13 NOTE — TELEPHONE ENCOUNTER
She wants to see if [facility] and they have one box. Rx transferred for Clotrimazole 1% vaginal cream

## 2025-03-13 NOTE — TELEPHONE ENCOUNTER
"Caller: Chencho Bah \"Ole Max\"    Relationship to patient: Self    Best call back number: [phone]      Patient is needing: SOMETHING ELSE CALLED INTO PHARMACY FOR YEAST INFECTION - THE ORIGINAL PRESCRIPTION CALLED IN FOR YEAST INFECTION THE PHARMACY IS OUT OF AND UNABLE TO FILL.         "

## 2025-03-17 ENCOUNTER — OFFICE VISIT (OUTPATIENT)
Dept: OBSTETRICS AND GYNECOLOGY | Facility: CLINIC | Age: 45
End: 2025-03-17
Payer: COMMERCIAL

## 2025-03-17 VITALS
DIASTOLIC BLOOD PRESSURE: 82 MMHG | BODY MASS INDEX: 32.62 KG/M2 | SYSTOLIC BLOOD PRESSURE: 140 MMHG | WEIGHT: 203 LBS | HEIGHT: 66 IN

## 2025-03-17 DIAGNOSIS — N89.8 VAGINAL DISCHARGE: Primary | ICD-10-CM

## 2025-03-17 PROCEDURE — 1159F MED LIST DOCD IN RCRD: CPT | Performed by: NURSE PRACTITIONER

## 2025-03-17 PROCEDURE — 87210 SMEAR WET MOUNT SALINE/INK: CPT | Performed by: NURSE PRACTITIONER

## 2025-03-17 PROCEDURE — 1160F RVW MEDS BY RX/DR IN RCRD: CPT | Performed by: NURSE PRACTITIONER

## 2025-03-17 PROCEDURE — 99213 OFFICE O/P EST LOW 20 MIN: CPT | Performed by: NURSE PRACTITIONER

## 2025-03-17 NOTE — PROGRESS NOTES
"            Chief Complaint   Patient presents with    Vaginitis         Subjective   HPI  Chencho Bah is a 45 y.o. female, , who presents for evaluation of  recurrent vaginal discharge . The discharge is watery and clear.  Her symptoms have been ongoing since her last visit.  Additional she has noticed  none .    Patient reports that she just completed a round of flagyl and doxycycline for a bacterial infection that was self diagnosed, our office did send that medication in.  In addition her PCP is giving her fluconazole to take daily.  She has not recently changed soaps/detergents/toilet tissue.      Sexual History    She is not currently sexually active. In the past year there has been NO new sexual partners. Condoms are never used.  She would not like to be screened for STD's at today's exam.    Current birth control method:  hysterectomy  .    Menstrual History:    No LMP recorded. Patient has had a hysterectomy.     Post-coital bleeding is absent.      Additional OB/GYN History   Last Pap :   Last Completed Pap Smear    This patient has no relevant Health Maintenance data.         OB History          4    Para   2    Term   2            AB   2    Living   2         SAB   2    IAB        Ectopic        Molar        Multiple        Live Births   2                The additional following portions of the patient's history were reviewed and updated as appropriate: allergies, current medications, past family history, past medical history, past social history, past surgical history, and problem list.    Review of Systems   Constitutional: Negative.    Genitourinary:  Positive for vaginal discharge.     All other systems reviewed and are negative.     I have reviewed and agree with the HPI, ROS, and historical information as entered above. Akila Manley, APRN      Objective   /82   Ht 167.6 cm (65.98\")   Wt 92.1 kg (203 lb)   BMI 32.78 kg/m²     Physical Exam  Constitutional: "       Appearance: Normal appearance.   Pulmonary:      Effort: Pulmonary effort is normal.   Abdominal:      Palpations: Abdomen is soft.   Genitourinary:     General: Normal vulva.      Vagina: Vaginal discharge present.      Uterus: Absent.       Comments: Scant white discharge present.   Neurological:      Mental Status: She is alert.         Wet mount performed? Yes. Pertinent positives include: Yeast Buds    Assessment & Plan     Assessment and Plan    Problem List Items Addressed This Visit    None  Visit Diagnoses         Vaginal discharge    -  Primary          Her nuswab with Dr Sinclair 2/25/25 was positive for candida glabrata and she was treated with fluconazole and lotrisone. We discussed the high rate of resistance with glabrata and azoles and she will get OTC boric acid 600 mg and use nightly x 3 weeks. Pharmacist Gene with professional said that it is the same formulation and less expensive to get OTC. We discussed inserting this vaginally only as oral intake or ingestion can be fatal. She VU. After the 3 week course she could use boric acid suppositories twice weekly as prevention. She has still been using bleach in her mouth to keep the yeast at bay, I discussed again not to use bleach in the mouth as it is toxic.  She has finally been accepted by ID at  after my referral was previously declined. She is seeing them in April. I told her it is best if they can see what her mouth looks like when she is not using any kind of OTC chemical to treat herself, otherwise they may not be able to help her.     Will not send nuswab since + glabrata on 2/25/25 and + yeast on KOH today  She will get OTC boric acid suppositories and use nightly PV x 3 weeks and then can use twice weekly for prevention  Keep ID appointment.         Akila Manlye, APRN  03/17/2025

## 2025-03-25 ENCOUNTER — TELEPHONE (OUTPATIENT)
Dept: OBSTETRICS AND GYNECOLOGY | Facility: CLINIC | Age: 45
End: 2025-03-25

## 2025-03-25 NOTE — TELEPHONE ENCOUNTER
"Caller: Chencho Bah \"Ole Max\"    Relationship:  Self    Best call back number: [phone]    PATIENT CALLED REQUESTING TO CANCEL SAME DAY APPT.    Did the patient call AFTER the start time of their scheduled appointment?  []YES  [x]NO    Was the patient's appointment rescheduled? []YES  [x]NO      "

## 2025-04-08 ENCOUNTER — TELEPHONE (OUTPATIENT)
Dept: OBSTETRICS AND GYNECOLOGY | Facility: CLINIC | Age: 45
End: 2025-04-08

## 2025-04-08 NOTE — TELEPHONE ENCOUNTER
"Caller: Chencho Bah \"Ole Max\"    Relationship:  Self    Best call back number: [phone] (home)     PATIENT CALLED REQUESTING TO CANCEL SAME DAY APPT.    Did the patient call AFTER the start time of their scheduled appointment?  []YES  [x]NO    Was the patient's appointment rescheduled? []YES  [x]NO    Any additional information: PT CANCELED DUE TO NO TRANSPORTATION.  "

## 2025-06-03 ENCOUNTER — OFFICE VISIT (OUTPATIENT)
Dept: OBSTETRICS AND GYNECOLOGY | Facility: CLINIC | Age: 45
End: 2025-06-03
Payer: COMMERCIAL

## 2025-06-03 VITALS
SYSTOLIC BLOOD PRESSURE: 130 MMHG | BODY MASS INDEX: 30.76 KG/M2 | DIASTOLIC BLOOD PRESSURE: 94 MMHG | WEIGHT: 191.4 LBS | HEIGHT: 66 IN

## 2025-06-03 DIAGNOSIS — B96.89 BV (BACTERIAL VAGINOSIS): ICD-10-CM

## 2025-06-03 DIAGNOSIS — B37.9 INFECTION DUE TO YEAST: ICD-10-CM

## 2025-06-03 DIAGNOSIS — N76.0 BV (BACTERIAL VAGINOSIS): ICD-10-CM

## 2025-06-03 DIAGNOSIS — R85.9 SALIVA ABNORMALITY: ICD-10-CM

## 2025-06-03 DIAGNOSIS — R35.0 URINARY FREQUENCY: ICD-10-CM

## 2025-06-03 DIAGNOSIS — N89.8 VAGINAL DISCHARGE: Primary | ICD-10-CM

## 2025-06-03 RX ORDER — FLUCONAZOLE 150 MG/1
150 TABLET ORAL DAILY
Qty: 30 TABLET | Refills: 3 | Status: SHIPPED | OUTPATIENT
Start: 2025-06-03

## 2025-06-03 RX ORDER — DOXYCYCLINE 100 MG/1
100 CAPSULE ORAL 2 TIMES DAILY
Qty: 14 CAPSULE | Refills: 6 | Status: SHIPPED | OUTPATIENT
Start: 2025-06-03 | End: 2025-06-09

## 2025-06-03 RX ORDER — FLUCYTOSINE 500 MG/1
500 CAPSULE ORAL 4 TIMES DAILY
Qty: 56 CAPSULE | Refills: 6 | Status: SHIPPED | OUTPATIENT
Start: 2025-06-03

## 2025-06-03 NOTE — PROGRESS NOTES
"            Chief Complaint   Patient presents with    Vaginitis         Subjective   HPI  Chencho Bah is a 45 y.o. female, , who presents for evaluation of discharge. The discharge is watery and clear. Reports the discharge has a foul odor. Denies any vaginal itching or burning. Complaining of urinary frequency and urgency that started 2 weeks ago.       She was seen on 25 and 3/17/25 for similar vaginitis symptoms. Nuswab positive for candida glabrata on 25. Akila recommended boric acid suppositories [id number] when seen on 3/17/25. She saw ID on 25, the report notes \"Recommend that pt's providers focus on counseling and symptom management rather than empiric prescriptions of antibiotics and antifungals, as the latter are more likely to contribute to dysbiosis. Would consider topical estrogen to aid in patient's current symptoms of vaginal dryness, after a risk-benefit discussion given pt's history of Factor V \".    Pt also complaining of yeast in her mouth- reports difficulty swallowing, tongue pain, and foamy saliva.      Sexual History    She is currently sexually active. In the past year there has been NO new sexual partners. Condoms are never used.  She would not like to be screened for STD's at today's exam.    Current birth control method: hysterectomy.    Menstrual History:    No LMP recorded. Patient has had a hysterectomy.        Additional OB/GYN History   Last Pap :   Last Completed Pap Smear    This patient has no relevant Health Maintenance data.         OB History          4    Para   2    Term   2            AB   2    Living   2         SAB   2    IAB        Ectopic        Molar        Multiple        Live Births   2                The additional following portions of the patient's history were reviewed and updated as appropriate: allergies, current medications, past family history, past medical history, past social history, past surgical history, and " "problem list.    Review of Systems   Constitutional: Negative.    HENT: Negative.     Eyes: Negative.    Respiratory: Negative.     Cardiovascular: Negative.    Gastrointestinal: Negative.    Endocrine: Negative.    Genitourinary:  Positive for frequency, urgency and vaginal discharge.        Vaginal odor   Musculoskeletal: Negative.    Skin: Negative.    Allergic/Immunologic: Negative.    Neurological: Negative.    Hematological: Negative.    Psychiatric/Behavioral: Negative.       All other systems reviewed and are negative.     I have reviewed and agree with the HPI, ROS, and historical information as entered above. Elizabeth Sinclair MD      Objective   /94   Ht 167.6 cm (65.98\")   Wt 86.8 kg (191 lb 6.4 oz)   BMI 30.91 kg/m²     Physical Exam  Vitals and nursing note reviewed. Exam conducted with a chaperone present.   Constitutional:       Appearance: She is well-developed.   HENT:      Head: Normocephalic and atraumatic.   Neck:      Thyroid: No thyroid mass or thyromegaly.   Cardiovascular:      Rate and Rhythm: Normal rate and regular rhythm.      Heart sounds: No murmur heard.  Pulmonary:      Effort: Pulmonary effort is normal. No retractions.      Breath sounds: Normal breath sounds. No wheezing, rhonchi or rales.   Chest:      Chest wall: No mass or tenderness.   Breasts:     Right: Normal. No mass, nipple discharge, skin change or tenderness.      Left: Normal. No mass, nipple discharge, skin change or tenderness.   Abdominal:      General: Bowel sounds are normal.      Palpations: Abdomen is soft. Abdomen is not rigid. There is no mass.      Tenderness: There is no abdominal tenderness. There is no guarding.      Hernia: No hernia is present. There is no hernia in the left inguinal area or right inguinal area.   Genitourinary:     General: Normal vulva.      Exam position: Lithotomy position.      Pubic Area: No rash.       Labia:         Right: No rash, tenderness or lesion.         Left: No " rash, tenderness or lesion.       Urethra: No urethral pain or urethral swelling.      Vagina: Normal. No vaginal discharge or lesions.      Uterus: Absent.       Adnexa:         Right: No mass, tenderness or fullness.          Left: No mass, tenderness or fullness.        Rectum: No external hemorrhoid.      Comments: Cervix surgically absent.  Vaginal cuff intact.  Musculoskeletal:      Cervical back: Normal range of motion. No muscular tenderness.   Neurological:      Mental Status: She is alert and oriented to person, place, and time.   Psychiatric:         Behavior: Behavior normal.     Vaginal area shows minimal discharge.    Wet mount performed? Yes. Pertinent positives include: negative      Assessment & Plan     Assessment and Plan    Problem List Items Addressed This Visit    None  Visit Diagnoses         Vaginal discharge    -  Primary    Relevant Medications    doxycycline (MONODOX) 100 MG capsule    Other Relevant Orders    Chlamydia trachomatis, Neisseria gonorrhoeae, Trichomonas vaginalis, PCR - Swab, Cervix    NuSwab BV & Candida - Swab, Vagina      Saliva abnormality        Relevant Orders    Nuab VG+, Candida 6sp      BV (bacterial vaginosis)        Relevant Medications    doxycycline (MONODOX) 100 MG capsule      Infection due to yeast        Relevant Medications    flucytosine (ANCOBON) 500 MG capsule    fluconazole (Diflucan) 150 MG tablet      Urinary frequency        Relevant Orders    POC Urinalysis Dipstick (Completed)              NuSwab ordered  Medication(s) ordered  Counseling on Vaginitis provided  Patient has symptoms of which she says are systemic yeast.  Apparently there is not much found on any of her evaluations except last visit we did find Candida glabrata.  She is on flucytosine and wants to continue that along with doxycycline as needed as well as fluconazole.  We cultured NuSwab vaginal and oral by her request checked her urine which was negative and wet smear was negative.   Will see how this goes.  I warned her that some of these medications that should she take them constantly can cause harm and she understands that.  May refer to [facility] if cannot obtain success.  wet mount normal epithelial cells  abstain from coitus during course of treatment  Patient reports this is started since she began having intercourse.  This was recently in the last few months.        Elizabeth Sinclair MD  06/03/2025

## 2025-06-05 LAB
C TRACH RRNA SPEC QL NAA+PROBE: NEGATIVE
N GONORRHOEA RRNA SPEC QL NAA+PROBE: NEGATIVE
T VAGINALIS RRNA SPEC QL NAA+PROBE: NEGATIVE

## 2025-06-09 RX ORDER — DOXYCYCLINE 100 MG/1
100 CAPSULE ORAL 2 TIMES DAILY
Qty: 14 CAPSULE | Refills: 6 | Status: SHIPPED | OUTPATIENT
Start: 2025-06-09

## 2025-08-14 ENCOUNTER — OFFICE VISIT (OUTPATIENT)
Dept: GASTROENTEROLOGY | Facility: CLINIC | Age: 45
End: 2025-08-14
Payer: COMMERCIAL

## 2025-08-14 VITALS
OXYGEN SATURATION: 97 % | WEIGHT: 191 LBS | TEMPERATURE: 97.8 F | HEART RATE: 92 BPM | SYSTOLIC BLOOD PRESSURE: 130 MMHG | HEIGHT: 66 IN | BODY MASS INDEX: 30.7 KG/M2 | DIASTOLIC BLOOD PRESSURE: 76 MMHG

## 2025-08-14 DIAGNOSIS — R10.12 LEFT UPPER QUADRANT ABDOMINAL PAIN: ICD-10-CM

## 2025-08-14 DIAGNOSIS — R19.7 DIARRHEA, UNSPECIFIED TYPE: ICD-10-CM

## 2025-08-14 DIAGNOSIS — K21.00 GASTROESOPHAGEAL REFLUX DISEASE WITH ESOPHAGITIS WITHOUT HEMORRHAGE: Primary | ICD-10-CM

## 2025-08-14 DIAGNOSIS — Q45.3 PANCREAS DIVISUM: ICD-10-CM

## 2025-08-14 PROCEDURE — 1160F RVW MEDS BY RX/DR IN RCRD: CPT | Performed by: INTERNAL MEDICINE

## 2025-08-14 PROCEDURE — 1159F MED LIST DOCD IN RCRD: CPT | Performed by: INTERNAL MEDICINE

## 2025-08-14 PROCEDURE — 99213 OFFICE O/P EST LOW 20 MIN: CPT | Performed by: INTERNAL MEDICINE

## 2025-08-26 ENCOUNTER — TELEPHONE (OUTPATIENT)
Dept: OBSTETRICS AND GYNECOLOGY | Facility: CLINIC | Age: 45
End: 2025-08-26